# Patient Record
Sex: MALE | Race: WHITE | NOT HISPANIC OR LATINO | URBAN - METROPOLITAN AREA
[De-identification: names, ages, dates, MRNs, and addresses within clinical notes are randomized per-mention and may not be internally consistent; named-entity substitution may affect disease eponyms.]

---

## 2019-10-19 ENCOUNTER — EMERGENCY (EMERGENCY)
Facility: HOSPITAL | Age: 84
LOS: 1 days | Discharge: HOME | End: 2019-10-19
Attending: EMERGENCY MEDICINE | Admitting: EMERGENCY MEDICINE
Payer: MEDICARE

## 2019-10-19 VITALS
WEIGHT: 160.06 LBS | DIASTOLIC BLOOD PRESSURE: 81 MMHG | HEIGHT: 69 IN | HEART RATE: 90 BPM | RESPIRATION RATE: 18 BRPM | OXYGEN SATURATION: 97 % | SYSTOLIC BLOOD PRESSURE: 167 MMHG | TEMPERATURE: 97 F

## 2019-10-19 PROCEDURE — 99282 EMERGENCY DEPT VISIT SF MDM: CPT

## 2019-10-19 NOTE — ED ADULT NURSE NOTE - PAIN RATING/NUMBER SCALE (0-10): ACTIVITY
Patient Instructions by Aníbal Hendricks MD at 09/11/17 03:05 PM     Author:  Aníbal Hendricks MD Service:  (none) Author Type:  Physician     Filed:  09/11/17 03:05 PM Encounter Date:  9/11/2017 Status:  Signed     :  Aníbal Hendricks MD (Physician)            PATIENT INFORMATION   Ice the area and get Adequate Rest and do not exercise for 14 days.    Stay well hydrated.  Do not drive if you are taking pain medications that cause drowsiness.    .    Follow-Up    -- If not better in 10 days, call or make a follow-up appointment.   -- Try over the counter tylenol 500mg every 6 hours for pain or fever.      You may get a survey in the mail.   I would request you to please take time to fill it and give us your feedback on your experience with me.   Thanks for choosing me to provide your health care today.      Additional Educational Resources:  For additional resources regarding your symptoms, diagnosis, or further health information, please visit the Health Resources section on [email or web address] or the Online Health Resources section in [email or web address].          Revision History        User Key Date/Time User Provider Type Action    > [N/A] 09/11/17 03:05 PM Aníbal Hendricks MD Physician Sign            
0

## 2019-10-19 NOTE — ED ADULT TRIAGE NOTE - CHIEF COMPLAINT QUOTE
pt p/w L shoulder numbness earlier today, per patient since resolved. Felt a "ball" move from his neck down to shoulder and now feels it moved to his shoulder.

## 2019-10-19 NOTE — ED PROVIDER NOTE - PATIENT PORTAL LINK FT
You can access the FollowMyHealth Patient Portal offered by [facility] by registering at the following website: [email or web address]. By joining [employer]’s FollowMyHealth portal, you will also be able to view your health information using other applications (apps) compatible with our system.

## 2019-10-19 NOTE — ED PROVIDER NOTE - OBJECTIVE STATEMENT
lower back of head pain c radiation to neck and then to left elbow started about 4am today.  Pain lasted about 20 minutes.  no prior episode.  no other complaints.

## 2019-10-19 NOTE — ED ADULT NURSE NOTE - OBJECTIVE STATEMENT
patient states approx 4 am he awoke with pain in the back of his head that "rolled around like a ball then traveled down the left side of my neck to my left shoulder down the arm to my elbow" pt denies nausea vomiting dizziness blurry vision state he came to have it checked out pt denies injury has no pain at present

## 2019-10-19 NOTE — ED ADULT NURSE NOTE - NSIMPLEMENTINTERV_GEN_ALL_ED
Implemented All Fall with Harm Risk Interventions:  [place] to call system. Call bell, personal items and telephone within reach. Instruct patient to call for assistance. Room bathroom lighting operational. Non-slip footwear when patient is off stretcher. Physically safe environment: no spills, clutter or unnecessary equipment. Stretcher in lowest position, wheels locked, appropriate side rails in place. Provide visual cue, wrist band, yellow gown, etc. Monitor gait and stability. Monitor for mental status changes and reorient to person, place, and time. Review medications for side effects contributing to fall risk. Reinforce activity limits and safety measures with patient and family. Provide visual clues: red socks.

## 2019-10-19 NOTE — ED PROVIDER NOTE - CHPI ED SYMPTOMS NEG
no loss of consciousness/no nausea/no vomiting/HO chronic dizziness intermittent/no change in level of consciousness/no numbness/no blurred vision/no dizziness/no fever/no weakness

## 2021-11-03 ENCOUNTER — EMERGENCY (EMERGENCY)
Facility: HOSPITAL | Age: 86
LOS: 1 days | Discharge: HOME | End: 2021-11-03
Attending: STUDENT IN AN ORGANIZED HEALTH CARE EDUCATION/TRAINING PROGRAM | Admitting: STUDENT IN AN ORGANIZED HEALTH CARE EDUCATION/TRAINING PROGRAM
Payer: MEDICARE

## 2021-11-03 VITALS
TEMPERATURE: 97 F | OXYGEN SATURATION: 98 % | RESPIRATION RATE: 16 BRPM | HEART RATE: 84 BPM | DIASTOLIC BLOOD PRESSURE: 70 MMHG | HEIGHT: 69 IN | WEIGHT: 158.07 LBS | SYSTOLIC BLOOD PRESSURE: 140 MMHG

## 2021-11-03 VITALS
HEART RATE: 73 BPM | DIASTOLIC BLOOD PRESSURE: 70 MMHG | SYSTOLIC BLOOD PRESSURE: 106 MMHG | TEMPERATURE: 98 F | RESPIRATION RATE: 16 BRPM | OXYGEN SATURATION: 98 %

## 2021-11-03 PROBLEM — E11.9 TYPE 2 DIABETES MELLITUS WITHOUT COMPLICATIONS: Chronic | Status: ACTIVE | Noted: 2019-10-19

## 2021-11-03 LAB
ALBUMIN SERPL ELPH-MCNC: 3.3 G/DL — SIGNIFICANT CHANGE UP (ref 3.3–5)
ALP SERPL-CCNC: 52 U/L — SIGNIFICANT CHANGE UP (ref 40–120)
ALT FLD-CCNC: 20 U/L — SIGNIFICANT CHANGE UP (ref 12–78)
ANION GAP SERPL CALC-SCNC: 7 MMOL/L — SIGNIFICANT CHANGE UP (ref 5–17)
APPEARANCE UR: ABNORMAL
AST SERPL-CCNC: 15 U/L — SIGNIFICANT CHANGE UP (ref 15–37)
BASOPHILS # BLD AUTO: 0.01 K/UL — SIGNIFICANT CHANGE UP (ref 0–0.2)
BASOPHILS NFR BLD AUTO: 0.1 % — SIGNIFICANT CHANGE UP (ref 0–2)
BILIRUB SERPL-MCNC: 0.5 MG/DL — SIGNIFICANT CHANGE UP (ref 0.2–1.2)
BILIRUB UR-MCNC: NEGATIVE — SIGNIFICANT CHANGE UP
BUN SERPL-MCNC: 27 MG/DL — HIGH (ref 7–23)
CALCIUM SERPL-MCNC: 9.1 MG/DL — SIGNIFICANT CHANGE UP (ref 8.5–10.1)
CHLORIDE SERPL-SCNC: 104 MMOL/L — SIGNIFICANT CHANGE UP (ref 96–108)
CO2 SERPL-SCNC: 27 MMOL/L — SIGNIFICANT CHANGE UP (ref 22–31)
COLOR SPEC: SIGNIFICANT CHANGE UP
CREAT SERPL-MCNC: 1.5 MG/DL — HIGH (ref 0.5–1.3)
DIFF PNL FLD: ABNORMAL
EOSINOPHIL # BLD AUTO: 0.01 K/UL — SIGNIFICANT CHANGE UP (ref 0–0.5)
EOSINOPHIL NFR BLD AUTO: 0.1 % — SIGNIFICANT CHANGE UP (ref 0–6)
GLUCOSE SERPL-MCNC: 206 MG/DL — HIGH (ref 70–99)
GLUCOSE UR QL: NEGATIVE — SIGNIFICANT CHANGE UP
HCT VFR BLD CALC: 36.3 % — LOW (ref 39–50)
HGB BLD-MCNC: 12 G/DL — LOW (ref 13–17)
IMM GRANULOCYTES NFR BLD AUTO: 0.5 % — SIGNIFICANT CHANGE UP (ref 0–1.5)
KETONES UR-MCNC: ABNORMAL
LEUKOCYTE ESTERASE UR-ACNC: NEGATIVE — SIGNIFICANT CHANGE UP
LYMPHOCYTES # BLD AUTO: 1.19 K/UL — SIGNIFICANT CHANGE UP (ref 1–3.3)
LYMPHOCYTES # BLD AUTO: 11.7 % — LOW (ref 13–44)
MCHC RBC-ENTMCNC: 31.6 PG — SIGNIFICANT CHANGE UP (ref 27–34)
MCHC RBC-ENTMCNC: 33.1 GM/DL — SIGNIFICANT CHANGE UP (ref 32–36)
MCV RBC AUTO: 95.5 FL — SIGNIFICANT CHANGE UP (ref 80–100)
MONOCYTES # BLD AUTO: 0.8 K/UL — SIGNIFICANT CHANGE UP (ref 0–0.9)
MONOCYTES NFR BLD AUTO: 7.9 % — SIGNIFICANT CHANGE UP (ref 2–14)
NEUTROPHILS # BLD AUTO: 8.13 K/UL — HIGH (ref 1.8–7.4)
NEUTROPHILS NFR BLD AUTO: 79.7 % — HIGH (ref 43–77)
NITRITE UR-MCNC: NEGATIVE — SIGNIFICANT CHANGE UP
NRBC # BLD: 0 /100 WBCS — SIGNIFICANT CHANGE UP (ref 0–0)
PH UR: 5 — SIGNIFICANT CHANGE UP (ref 5–8)
PLATELET # BLD AUTO: 188 K/UL — SIGNIFICANT CHANGE UP (ref 150–400)
POTASSIUM SERPL-MCNC: 4.7 MMOL/L — SIGNIFICANT CHANGE UP (ref 3.5–5.3)
POTASSIUM SERPL-SCNC: 4.7 MMOL/L — SIGNIFICANT CHANGE UP (ref 3.5–5.3)
PROT SERPL-MCNC: 6.9 G/DL — SIGNIFICANT CHANGE UP (ref 6–8.3)
PROT UR-MCNC: 30 MG/DL
RBC # BLD: 3.8 M/UL — LOW (ref 4.2–5.8)
RBC # FLD: 12.6 % — SIGNIFICANT CHANGE UP (ref 10.3–14.5)
SODIUM SERPL-SCNC: 138 MMOL/L — SIGNIFICANT CHANGE UP (ref 135–145)
SP GR SPEC: 1.02 — SIGNIFICANT CHANGE UP (ref 1.01–1.02)
UROBILINOGEN FLD QL: NEGATIVE — SIGNIFICANT CHANGE UP
WBC # BLD: 10.19 K/UL — SIGNIFICANT CHANGE UP (ref 3.8–10.5)
WBC # FLD AUTO: 10.19 K/UL — SIGNIFICANT CHANGE UP (ref 3.8–10.5)

## 2021-11-03 PROCEDURE — 36415 COLL VENOUS BLD VENIPUNCTURE: CPT

## 2021-11-03 PROCEDURE — 96374 THER/PROPH/DIAG INJ IV PUSH: CPT

## 2021-11-03 PROCEDURE — 99284 EMERGENCY DEPT VISIT MOD MDM: CPT

## 2021-11-03 PROCEDURE — 99284 EMERGENCY DEPT VISIT MOD MDM: CPT | Mod: 25

## 2021-11-03 PROCEDURE — 80053 COMPREHEN METABOLIC PANEL: CPT

## 2021-11-03 PROCEDURE — 87086 URINE CULTURE/COLONY COUNT: CPT

## 2021-11-03 PROCEDURE — 74176 CT ABD & PELVIS W/O CONTRAST: CPT | Mod: 26,MA

## 2021-11-03 PROCEDURE — 81001 URINALYSIS AUTO W/SCOPE: CPT

## 2021-11-03 PROCEDURE — 85025 COMPLETE CBC W/AUTO DIFF WBC: CPT

## 2021-11-03 PROCEDURE — 74176 CT ABD & PELVIS W/O CONTRAST: CPT | Mod: MA

## 2021-11-03 RX ORDER — TAMSULOSIN HYDROCHLORIDE 0.4 MG/1
1 CAPSULE ORAL
Qty: 10 | Refills: 0
Start: 2021-11-03 | End: 2021-11-12

## 2021-11-03 RX ORDER — LIDOCAINE 4 G/100G
1 CREAM TOPICAL ONCE
Refills: 0 | Status: COMPLETED | OUTPATIENT
Start: 2021-11-03 | End: 2021-11-03

## 2021-11-03 RX ORDER — SODIUM CHLORIDE 9 MG/ML
500 INJECTION INTRAMUSCULAR; INTRAVENOUS; SUBCUTANEOUS ONCE
Refills: 0 | Status: DISCONTINUED | OUTPATIENT
Start: 2021-11-03 | End: 2021-11-07

## 2021-11-03 RX ORDER — CEFUROXIME AXETIL 250 MG
1 TABLET ORAL
Qty: 14 | Refills: 0
Start: 2021-11-03 | End: 2021-11-09

## 2021-11-03 RX ORDER — TAMSULOSIN HYDROCHLORIDE 0.4 MG/1
0.4 CAPSULE ORAL ONCE
Refills: 0 | Status: COMPLETED | OUTPATIENT
Start: 2021-11-03 | End: 2021-11-03

## 2021-11-03 RX ORDER — CEFTRIAXONE 500 MG/1
1000 INJECTION, POWDER, FOR SOLUTION INTRAMUSCULAR; INTRAVENOUS ONCE
Refills: 0 | Status: COMPLETED | OUTPATIENT
Start: 2021-11-03 | End: 2021-11-03

## 2021-11-03 RX ORDER — SODIUM CHLORIDE 9 MG/ML
1000 INJECTION INTRAMUSCULAR; INTRAVENOUS; SUBCUTANEOUS ONCE
Refills: 0 | Status: DISCONTINUED | OUTPATIENT
Start: 2021-11-03 | End: 2021-11-03

## 2021-11-03 RX ORDER — SODIUM CHLORIDE 9 MG/ML
250 INJECTION INTRAMUSCULAR; INTRAVENOUS; SUBCUTANEOUS ONCE
Refills: 0 | Status: COMPLETED | OUTPATIENT
Start: 2021-11-03 | End: 2021-11-03

## 2021-11-03 RX ORDER — OXYCODONE AND ACETAMINOPHEN 5; 325 MG/1; MG/1
1 TABLET ORAL
Qty: 15 | Refills: 0
Start: 2021-11-03 | End: 2021-11-05

## 2021-11-03 RX ADMIN — SODIUM CHLORIDE 250 MILLILITER(S): 9 INJECTION INTRAMUSCULAR; INTRAVENOUS; SUBCUTANEOUS at 14:34

## 2021-11-03 RX ADMIN — TAMSULOSIN HYDROCHLORIDE 0.4 MILLIGRAM(S): 0.4 CAPSULE ORAL at 14:36

## 2021-11-03 RX ADMIN — CEFTRIAXONE 100 MILLIGRAM(S): 500 INJECTION, POWDER, FOR SOLUTION INTRAMUSCULAR; INTRAVENOUS at 14:34

## 2021-11-03 RX ADMIN — LIDOCAINE 1 PATCH: 4 CREAM TOPICAL at 12:46

## 2021-11-03 NOTE — ED PROVIDER NOTE - PATIENT PORTAL LINK FT
You can access the FollowMyHealth Patient Portal offered by [facility] by registering at the following website: [email or web address]. By joining [employer]’s FollowMyHealth portal, you will also be able to view your health information using other applications (apps) compatible with our system.

## 2021-11-03 NOTE — ED PROVIDER NOTE - ATTENDING CONTRIBUTION TO CARE
89yo M pw right lwoer back pain since 4am this morning, somewhat improved with tylenol, no assoc symptoms, no abd pain  likely msk, will check ct eval stone

## 2021-11-03 NOTE — ED PROVIDER NOTE - CLINICAL SUMMARY MEDICAL DECISION MAKING FREE TEXT BOX
right flank pain since this am. differentials include but not limited to renal colic, muscle strain, UTI, pyelonephritis. will check labs, US, CT renal stone hunt. declines pain meds

## 2021-11-03 NOTE — ED PROVIDER NOTE - OBJECTIVE STATEMENT
89 year old male with history of DM, HLD, HTN, and CHF presents with right lower flank pain that started this am 0400. no injury or trauma. took 2 tylenol PTA with overall improvement of pain. pain currently mild in nature. no n/v/d. no urinary complaints. no radicular pain. no abd pain. no leg pain. no loss of bowel or bladder control. ambulatory with steady gait   LARA Hansen

## 2021-11-03 NOTE — ED ADULT NURSE NOTE - ISOLATION TYPE:
Is This A New Presentation, Or A Follow-Up?: Skin Lesions Have Your Skin Lesions Been Treated?: not been treated None

## 2021-11-03 NOTE — ED PROVIDER NOTE - NSFOLLOWUPINSTRUCTIONS_ED_ALL_ED_FT
drink plenty of fluids  flomax once a day in evening   ceftin twice a day for 7 days  tylenol over the counter for pain. percocet for severe pain, take with stool softener   have close follow up with urology (next week in office with Dr. Avendano)      Kidney Stones    WHAT YOU NEED TO KNOW:    Kidney stones form in the urinary system when the water and waste in your urine are out of balance. When this happens, certain types of waste crystals separate from the urine. The crystals build up and form kidney stones. You may have more than one kidney stone.    Kidney Stones          DISCHARGE INSTRUCTIONS:    Return to the emergency department if:   •You are vomiting and it is not relieved with medicine.          Call your doctor or kidney specialist if:   •You have a fever.      •You have trouble urinating.      •You see blood in your urine.      •You have severe pain.      •You have any questions or concerns about your condition or care.      Medicines: You may need any of the following:  •NSAIDs, such as ibuprofen, help decrease swelling, pain, and fever. This medicine is available with or without a doctor's order. NSAIDs can cause stomach bleeding or kidney problems in certain people. If you take blood thinner medicine, always ask your healthcare provider if NSAIDs are safe for you. Always read the medicine label and follow directions.      •Acetaminophen decreases pain and fever. It is available without a doctor's order. Ask how much to take and how often to take it. Follow directions. Read the labels of all other medicines you are using to see if they also contain acetaminophen, or ask your doctor or pharmacist. Acetaminophen can cause liver damage if not taken correctly. Do not use more than 4 grams (4,000 milligrams) total of acetaminophen in one day.       •Prescription pain medicine may be given. Ask your healthcare provider how to take this medicine safely. Some prescription pain medicines contain acetaminophen. Do not take other medicines that contain acetaminophen without talking to your healthcare provider. Too much acetaminophen may cause liver damage. Prescription pain medicine may cause constipation. Ask your healthcare provider how to prevent or treat constipation.       •Medicines to balance your electrolytes may be needed.      •Take your medicine as directed. Contact your healthcare provider if you think your medicine is not helping or if you have side effects. Tell him or her if you are allergic to any medicine. Keep a list of the medicines, vitamins, and herbs you take. Include the amounts, and when and why you take them. Bring the list or the pill bottles to follow-up visits. Carry your medicine list with you in case of an emergency.      What you can do to manage kidney stones:   •Drink more liquids. Your healthcare provider may tell you to drink at least 8 to 12 (eight-ounce) cups of liquids each day. This helps flush out the kidney stones when you urinate. Water is the best liquid to drink.      •Strain your urine every time you go to the bathroom. Urinate through a strainer or a piece of thin cloth to catch the stones. Take the stones to your healthcare provider so they can be sent to the lab for tests. This will help your healthcare providers plan the best treatment for you.  Look for Stones in the Filter           •Ask if you should avoid any foods. You may need to limit oxalate. Oxalate is a chemical found in some plant foods. The most common type of kidney stone is made up of crystals that contain calcium and oxalate. Your healthcare provider or dietitian may recommend that you limit oxalate if you get this type of kidney stone often. You may need to limit how much sodium (salt) or protein you eat. Ask for information about the best foods for you.  High Oxalate Foods           •Be physically active as directed. Your stones may pass more easily if you stay active. Physical activity can also help you manage your weight. Ask about the best activities for you.  [employer] Walking for Exercise           After you pass the kidney stones: Your healthcare provider may order a 24-hour urine test. Results from a 24-hour urine test will help your healthcare provider plan ways to prevent more stones from forming. Your healthcare provider will give you more instructions.    Follow up with your doctor or kidney specialist as directed: Write down your questions so you remember to ask them during your visits.

## 2021-11-03 NOTE — ED PROVIDER NOTE - PROGRESS NOTE DETAILS
Reevaluated patient at bedside. denies current pain. no abd pain. taking po fluids. spoke with Dr. Avendano regarding CT findings, UA, and labs. states patient appropriate for discharge and will see in office next week. would like patient started on ceftin 250 mg twice a day. patient and wife comfortable with plan.  Discussed the results of all diagnostic testing in ED and copies of all reports given.   An opportunity to ask questions was given.  Discussed the importance of prompt, close medical follow-up.  Patient will return with any changes, concerns or persistent / worsening symptoms.  Understanding of all instructions verbalized.

## 2021-11-03 NOTE — ED PROVIDER NOTE - GASTROINTESTINAL NEGATIVE STATEMENT, MLM
right flank pain. no abdominal pain, no bloating, no constipation, no diarrhea, no nausea and no vomiting.

## 2021-11-03 NOTE — ED PROVIDER NOTE - CARE PROVIDER_API CALL
Adair Avendano  UROLOGY  [address], [address]  [place] [address]  Phone: [phone]  Fax: [phone]  Follow Up Time: 1-3 Days

## 2021-11-04 LAB
CULTURE RESULTS: SIGNIFICANT CHANGE UP
SPECIMEN SOURCE: SIGNIFICANT CHANGE UP

## 2022-04-20 PROBLEM — Z00.00 ENCOUNTER FOR PREVENTIVE HEALTH EXAMINATION: Status: ACTIVE | Noted: 2022-04-20

## 2022-05-11 ENCOUNTER — NON-APPOINTMENT (OUTPATIENT)
Age: 87
End: 2022-05-11

## 2023-05-24 ENCOUNTER — EMERGENCY (EMERGENCY)
Facility: HOSPITAL | Age: 88
LOS: 1 days | Discharge: HOME | End: 2023-05-24
Attending: STUDENT IN AN ORGANIZED HEALTH CARE EDUCATION/TRAINING PROGRAM | Admitting: STUDENT IN AN ORGANIZED HEALTH CARE EDUCATION/TRAINING PROGRAM
Payer: MEDICARE

## 2023-05-24 VITALS
TEMPERATURE: 98 F | SYSTOLIC BLOOD PRESSURE: 133 MMHG | DIASTOLIC BLOOD PRESSURE: 76 MMHG | HEIGHT: 68 IN | RESPIRATION RATE: 18 BRPM | OXYGEN SATURATION: 99 % | HEART RATE: 89 BPM | WEIGHT: 151.9 LBS

## 2023-05-24 VITALS
TEMPERATURE: 98 F | SYSTOLIC BLOOD PRESSURE: 146 MMHG | HEART RATE: 87 BPM | RESPIRATION RATE: 18 BRPM | DIASTOLIC BLOOD PRESSURE: 81 MMHG | OXYGEN SATURATION: 98 %

## 2023-05-24 LAB
ALBUMIN SERPL ELPH-MCNC: 3.5 G/DL — SIGNIFICANT CHANGE UP (ref 3.3–5)
ALP SERPL-CCNC: 42 U/L — SIGNIFICANT CHANGE UP (ref 40–120)
ALT FLD-CCNC: 16 U/L — SIGNIFICANT CHANGE UP (ref 12–78)
ANION GAP SERPL CALC-SCNC: 6 MMOL/L — SIGNIFICANT CHANGE UP (ref 5–17)
APPEARANCE UR: ABNORMAL
APTT BLD: 26.8 SEC — LOW (ref 27.5–35.5)
AST SERPL-CCNC: 15 U/L — SIGNIFICANT CHANGE UP (ref 15–37)
BASOPHILS # BLD AUTO: 0.02 K/UL — SIGNIFICANT CHANGE UP (ref 0–0.2)
BASOPHILS NFR BLD AUTO: 0.4 % — SIGNIFICANT CHANGE UP (ref 0–2)
BILIRUB SERPL-MCNC: 0.5 MG/DL — SIGNIFICANT CHANGE UP (ref 0.2–1.2)
BILIRUB UR-MCNC: NEGATIVE — SIGNIFICANT CHANGE UP
BLD GP AB SCN SERPL QL: SIGNIFICANT CHANGE UP
BUN SERPL-MCNC: 30 MG/DL — HIGH (ref 7–23)
CALCIUM SERPL-MCNC: 9.1 MG/DL — SIGNIFICANT CHANGE UP (ref 8.5–10.1)
CHLORIDE SERPL-SCNC: 106 MMOL/L — SIGNIFICANT CHANGE UP (ref 96–108)
CO2 SERPL-SCNC: 27 MMOL/L — SIGNIFICANT CHANGE UP (ref 22–31)
COLOR SPEC: ABNORMAL
CREAT SERPL-MCNC: 1.5 MG/DL — HIGH (ref 0.5–1.3)
DIFF PNL FLD: ABNORMAL
EGFR: 42 ML/MIN/1.73M2 — LOW
EOSINOPHIL # BLD AUTO: 0.15 K/UL — SIGNIFICANT CHANGE UP (ref 0–0.5)
EOSINOPHIL NFR BLD AUTO: 2.7 % — SIGNIFICANT CHANGE UP (ref 0–6)
EPI CELLS # UR: NEGATIVE — SIGNIFICANT CHANGE UP
GLUCOSE SERPL-MCNC: 133 MG/DL — HIGH (ref 70–99)
GLUCOSE UR QL: NEGATIVE — SIGNIFICANT CHANGE UP
HCT VFR BLD CALC: 28.3 % — LOW (ref 39–50)
HCT VFR BLD CALC: 29.3 % — LOW (ref 39–50)
HGB BLD-MCNC: 9.3 G/DL — LOW (ref 13–17)
HGB BLD-MCNC: 9.5 G/DL — LOW (ref 13–17)
IMM GRANULOCYTES NFR BLD AUTO: 0.5 % — SIGNIFICANT CHANGE UP (ref 0–0.9)
INR BLD: 1.12 RATIO — SIGNIFICANT CHANGE UP (ref 0.88–1.16)
KETONES UR-MCNC: ABNORMAL
LEUKOCYTE ESTERASE UR-ACNC: NEGATIVE — SIGNIFICANT CHANGE UP
LYMPHOCYTES # BLD AUTO: 1.51 K/UL — SIGNIFICANT CHANGE UP (ref 1–3.3)
LYMPHOCYTES # BLD AUTO: 27.5 % — SIGNIFICANT CHANGE UP (ref 13–44)
MCHC RBC-ENTMCNC: 30.4 PG — SIGNIFICANT CHANGE UP (ref 27–34)
MCHC RBC-ENTMCNC: 30.7 PG — SIGNIFICANT CHANGE UP (ref 27–34)
MCHC RBC-ENTMCNC: 32.4 GM/DL — SIGNIFICANT CHANGE UP (ref 32–36)
MCHC RBC-ENTMCNC: 32.9 GM/DL — SIGNIFICANT CHANGE UP (ref 32–36)
MCV RBC AUTO: 93.4 FL — SIGNIFICANT CHANGE UP (ref 80–100)
MCV RBC AUTO: 93.6 FL — SIGNIFICANT CHANGE UP (ref 80–100)
MONOCYTES # BLD AUTO: 0.75 K/UL — SIGNIFICANT CHANGE UP (ref 0–0.9)
MONOCYTES NFR BLD AUTO: 13.7 % — SIGNIFICANT CHANGE UP (ref 2–14)
NEUTROPHILS # BLD AUTO: 3.03 K/UL — SIGNIFICANT CHANGE UP (ref 1.8–7.4)
NEUTROPHILS NFR BLD AUTO: 55.2 % — SIGNIFICANT CHANGE UP (ref 43–77)
NITRITE UR-MCNC: NEGATIVE — SIGNIFICANT CHANGE UP
NRBC # BLD: 0 /100 WBCS — SIGNIFICANT CHANGE UP (ref 0–0)
NRBC # BLD: 0 /100 WBCS — SIGNIFICANT CHANGE UP (ref 0–0)
PH UR: 7 — SIGNIFICANT CHANGE UP (ref 5–8)
PLATELET # BLD AUTO: 209 K/UL — SIGNIFICANT CHANGE UP (ref 150–400)
PLATELET # BLD AUTO: 222 K/UL — SIGNIFICANT CHANGE UP (ref 150–400)
POTASSIUM SERPL-MCNC: 4.2 MMOL/L — SIGNIFICANT CHANGE UP (ref 3.5–5.3)
POTASSIUM SERPL-SCNC: 4.2 MMOL/L — SIGNIFICANT CHANGE UP (ref 3.5–5.3)
PROT SERPL-MCNC: 7 G/DL — SIGNIFICANT CHANGE UP (ref 6–8.3)
PROT UR-MCNC: 500 MG/DL
PROTHROM AB SERPL-ACNC: 13.1 SEC — SIGNIFICANT CHANGE UP (ref 10.5–13.4)
RBC # BLD: 3.03 M/UL — LOW (ref 4.2–5.8)
RBC # BLD: 3.13 M/UL — LOW (ref 4.2–5.8)
RBC # FLD: 13.2 % — SIGNIFICANT CHANGE UP (ref 10.3–14.5)
RBC # FLD: 13.3 % — SIGNIFICANT CHANGE UP (ref 10.3–14.5)
RBC CASTS # UR COMP ASSIST: >50 /HPF (ref 0–4)
SODIUM SERPL-SCNC: 139 MMOL/L — SIGNIFICANT CHANGE UP (ref 135–145)
SP GR SPEC: 1 — LOW (ref 1.01–1.02)
UROBILINOGEN FLD QL: NEGATIVE — SIGNIFICANT CHANGE UP
WBC # BLD: 5.49 K/UL — SIGNIFICANT CHANGE UP (ref 3.8–10.5)
WBC # BLD: 5.8 K/UL — SIGNIFICANT CHANGE UP (ref 3.8–10.5)
WBC # FLD AUTO: 5.49 K/UL — SIGNIFICANT CHANGE UP (ref 3.8–10.5)
WBC # FLD AUTO: 5.8 K/UL — SIGNIFICANT CHANGE UP (ref 3.8–10.5)
WBC UR QL: SIGNIFICANT CHANGE UP

## 2023-05-24 PROCEDURE — 85027 COMPLETE CBC AUTOMATED: CPT

## 2023-05-24 PROCEDURE — 36415 COLL VENOUS BLD VENIPUNCTURE: CPT

## 2023-05-24 PROCEDURE — 80053 COMPREHEN METABOLIC PANEL: CPT

## 2023-05-24 PROCEDURE — 99285 EMERGENCY DEPT VISIT HI MDM: CPT

## 2023-05-24 PROCEDURE — 86850 RBC ANTIBODY SCREEN: CPT

## 2023-05-24 PROCEDURE — 87086 URINE CULTURE/COLONY COUNT: CPT

## 2023-05-24 PROCEDURE — 85610 PROTHROMBIN TIME: CPT

## 2023-05-24 PROCEDURE — 86900 BLOOD TYPING SEROLOGIC ABO: CPT

## 2023-05-24 PROCEDURE — 74176 CT ABD & PELVIS W/O CONTRAST: CPT | Mod: MA

## 2023-05-24 PROCEDURE — 74176 CT ABD & PELVIS W/O CONTRAST: CPT | Mod: 26,MA

## 2023-05-24 PROCEDURE — 86901 BLOOD TYPING SEROLOGIC RH(D): CPT

## 2023-05-24 PROCEDURE — 85730 THROMBOPLASTIN TIME PARTIAL: CPT

## 2023-05-24 PROCEDURE — 99284 EMERGENCY DEPT VISIT MOD MDM: CPT | Mod: 25

## 2023-05-24 PROCEDURE — 85025 COMPLETE CBC W/AUTO DIFF WBC: CPT

## 2023-05-24 PROCEDURE — 81001 URINALYSIS AUTO W/SCOPE: CPT

## 2023-05-24 NOTE — ED PROVIDER NOTE - PATIENT PORTAL LINK FT
You can access the FollowMyHealth Patient Portal offered by [facility] by registering at the following website: [email or web address]. By joining [employer]’s FollowMyHealth portal, you will also be able to view your health information using other applications (apps) compatible with our system.

## 2023-05-24 NOTE — ED PROVIDER NOTE - PROGRESS NOTE DETAILS
rpt cbc stable, pt urianting, unable to reach dr dawkins, will dc with close urop fu and discharge instructions spoke with dr dawkins, ok with dc  will see pt in office after weekend, if any issues will need to come back to ED

## 2023-05-24 NOTE — ED PROVIDER NOTE - OBJECTIVE STATEMENT
89 year old male with history of DM, HLD, HTN, and CHF and kidney stones pw hematuria x 4 days. pt was seen by PCP earlier in the week and was started on ceftin 250mg BID 2 days ago for possible UTI. Pt had increased blood in urine today with clot so came to ED. denies dysuria, fever, chills, back or abdominal pain., denies difficulty urinating or completely voiding. no ho hematuria in past.

## 2023-05-24 NOTE — ED ADULT NURSE NOTE - NSFALLRISKINTERV_ED_ALL_ED
Assistance OOB with selected safe patient handling equipment if applicable/Communicate fall risk and risk factors to all staff, patient, and family/Provide visual cue: yellow wristband, yellow gown, etc/Reinforce activity limits and safety measures with patient and family/Toileting schedule using arm’s reach rule for commode and bathroom/Call bell, personal items and telephone in reach/Instruct patient to call for assistance before getting out of bed/chair/stretcher/Non-slip footwear applied when patient is off stretcher/[place] to call system/Physically safe environment - no spills, clutter or unnecessary equipment/Purposeful Proactive Rounding/Room/bathroom lighting operational, light cord in reach

## 2023-05-24 NOTE — ED ADULT NURSE NOTE - OBJECTIVE STATEMENT
pt ambulatory from triage a&oxo4 with complaints of blood in urine since saturday. reports saw PCP Jade and given PO abx. c/o some burning with urination but still able to pass urine. no obvious clots noted. denies lightheadedness/dizziness. skin warm and dry color normal. denies pain. abdomen doesn't appear distended. afebrile

## 2023-05-24 NOTE — ED PROVIDER NOTE - PHYSICAL EXAMINATION
Gen: Well appearing in NAD  Head: NC/AT  Neck: trachea midline  cv: rrr  Resp:  No distress  abd nontender  no cvat   Ext: no deformities  Neuro:  A&O appears non focal  Skin:  Warm and dry as visualized  Psych:  Normal affect and mood

## 2023-05-24 NOTE — ED PROVIDER NOTE - NSFOLLOWUPINSTRUCTIONS_ED_ALL_ED_FT
Follow up with urology as soon as possible  Continue taking antibiotics   Return to ED for worsening bleeding, difficulty urinating, fevers, lightheadedness chest pain or other concerns    Hematuria, Adult    Hematuria is blood in the urine. Blood may be visible in the urine, or it may be identified with a test. This condition can be caused by infections of the bladder, urethra, kidney, or prostate. Other possible causes include:  Kidney stones.  Cancer of the urinary tract.  Too much calcium in the urine.  Conditions that are passed from parent to child (inherited conditions).  Exercise that requires a lot of energy.  Infections can usually be treated with medicine, and a kidney stone usually will pass through your urine. If neither of these is the cause of your hematuria, more tests may be needed to identify the cause of your symptoms.    It is very important to tell your health care provider about any blood in your urine, even if it is painless or the blood stops without treatment. Blood in the urine, when it happens and then stops and then happens again, can be a symptom of a very serious condition, including cancer. There is no pain in the initial stages of many urinary cancers.    Follow these instructions at home:  Medicines    Take over-the-counter and prescription medicines only as told by your health care provider.  If you were prescribed an antibiotic medicine, take it as told by your health care provider. Do not stop taking the antibiotic even if you start to feel better.  Eating and drinking    Drink enough fluid to keep your urine pale yellow. It is recommended that you drink 3–4 quarts (2.8–3.8 L) a day. If you have been diagnosed with an infection, drinking cranberry juice in addition to large amounts of water is recommended.  Avoid caffeine, tea, and carbonated beverages. These tend to irritate the bladder.  Avoid alcohol because it may irritate the prostate (in males).  General instructions    If you have been diagnosed with a kidney stone, follow your health care provider's instructions about straining your urine to catch the stone.  Empty your bladder often. Avoid holding urine for long periods of time.  If you are female:  After a bowel movement, wipe from front to back and use each piece of toilet paper only once.  Empty your bladder before and after sex.  Pay attention to any changes in your symptoms. Tell your health care provider about any changes or any new symptoms.  It is up to you to get the results of any tests. Ask your health care provider, or the department that is doing the test, when your results will be ready.  Keep all follow-up visits. This is important.  Contact a health care provider if:  You develop back pain.  You have a fever or chills.  You have nausea or vomiting.  Your symptoms do not improve after 3 days.  Your symptoms get worse.  Get help right away if:  You develop severe vomiting and are unable to take medicine without vomiting.  You develop severe pain in your back or abdomen even though you are taking medicine.  You pass a large amount of blood in your urine.  You pass blood clots in your urine.  You feel very weak or like you might faint.  You faint.  Summary  Hematuria is blood in the urine. It has many possible causes.  It is very important that you tell your health care provider about any blood in your urine, even if it is painless or the blood stops without treatment.  Take over-the-counter and prescription medicines only as told by your health care provider.  Drink enough fluid to keep your urine pale yellow.  This information is not intended to replace advice given to you by your health care provider. Make sure you discuss any questions you have with your health care provider.    Document Revised: 08/18/2021 Document Reviewed: 08/18/2021  Elsevier Patient Education © 2023 [employer] Inc[employer]

## 2023-05-24 NOTE — ED PROVIDER NOTE - CLINICAL SUMMARY MEDICAL DECISION MAKING FREE TEXT BOX
89 year old male with history of DM, HLD, HTN, and CHF and kidney stones pw hematuria x 4 days. pt was seen by PCP earlier in the week and was started on ceftin 250mg BID 2 days ago for possible UTI. Pt had increased blood in urine today with clot so came to ED. denies dysuria, fever, chills, back or abdominal pain., denies difficulty urinating or completely voiding. no ho hematuria in past.     will eval for uti vs stone vs malignancy, labs, ua, CT   will need urology olga lidiao tien 89 year old male with history of DM, HLD, HTN, and CHF and kidney stones pw hematuria x 4 days. pt was seen by PCP earlier in the week and was started on ceftin 250mg BID 2 days ago for possible UTI. Pt had increased blood in urine today with clot so came to ED. denies dysuria, fever, chills, back or abdominal pain., denies difficulty urinating or completely voiding. no ho hematuria in past.   no ac use   will eval for uti vs stone vs malignancy, labs, ua, CT   will need urology olga lidiao tien

## 2023-05-24 NOTE — ED ADULT TRIAGE NOTE - CHIEF COMPLAINT QUOTE
c/o hematuria since saturday, was prescribed cefuroxime on monday for possible bladder infection. denies dizziness, lightheadedness, abd pain. pmhx htn, hld, bph, DM2.

## 2023-05-24 NOTE — ED PROVIDER NOTE - CARE PROVIDER_API CALL
Adair Avendano  UROLOGY  [address], [address]  [place] [address]  Phone: [phone]  Fax: [phone]  Follow Up Time: 1-3 Days

## 2023-05-25 LAB
CULTURE RESULTS: NO GROWTH — SIGNIFICANT CHANGE UP
SPECIMEN SOURCE: SIGNIFICANT CHANGE UP

## 2023-07-03 NOTE — ED ADULT NURSE NOTE - NS ED NURSE DC INFO COMPLEXITY
c/w metoprolol  Hold eliquis due to lydia
Spontaneous, unlabored and symmetrical
Simple: Patient demonstrates quick and easy understanding/Verbalized Understanding

## 2024-01-01 ENCOUNTER — INPATIENT (INPATIENT)
Facility: HOSPITAL | Age: 88
LOS: 1 days | DRG: 310 | End: 2024-12-06
Attending: INTERNAL MEDICINE | Admitting: INTERNAL MEDICINE
Payer: MEDICARE

## 2024-01-01 ENCOUNTER — RESULT REVIEW (OUTPATIENT)
Age: 88
End: 2024-01-01

## 2024-01-01 VITALS
HEIGHT: 66 IN | RESPIRATION RATE: 16 BRPM | WEIGHT: 160.06 LBS | HEART RATE: 42 BPM | OXYGEN SATURATION: 92 % | TEMPERATURE: 97 F | SYSTOLIC BLOOD PRESSURE: 108 MMHG | DIASTOLIC BLOOD PRESSURE: 60 MMHG

## 2024-01-01 VITALS — TEMPERATURE: 99 F

## 2024-01-01 DIAGNOSIS — R00.1 BRADYCARDIA, UNSPECIFIED: ICD-10-CM

## 2024-01-01 DIAGNOSIS — Z29.9 ENCOUNTER FOR PROPHYLACTIC MEASURES, UNSPECIFIED: ICD-10-CM

## 2024-01-01 DIAGNOSIS — E11.9 TYPE 2 DIABETES MELLITUS WITHOUT COMPLICATIONS: ICD-10-CM

## 2024-01-01 DIAGNOSIS — R60.0 LOCALIZED EDEMA: ICD-10-CM

## 2024-01-01 DIAGNOSIS — Z95.2 PRESENCE OF PROSTHETIC HEART VALVE: Chronic | ICD-10-CM

## 2024-01-01 DIAGNOSIS — D64.9 ANEMIA, UNSPECIFIED: ICD-10-CM

## 2024-01-01 DIAGNOSIS — Z51.5 ENCOUNTER FOR PALLIATIVE CARE: ICD-10-CM

## 2024-01-01 DIAGNOSIS — I10 ESSENTIAL (PRIMARY) HYPERTENSION: ICD-10-CM

## 2024-01-01 DIAGNOSIS — I25.10 ATHEROSCLEROTIC HEART DISEASE OF NATIVE CORONARY ARTERY WITHOUT ANGINA PECTORIS: ICD-10-CM

## 2024-01-01 DIAGNOSIS — Z71.89 OTHER SPECIFIED COUNSELING: ICD-10-CM

## 2024-01-01 DIAGNOSIS — Z98.890 OTHER SPECIFIED POSTPROCEDURAL STATES: Chronic | ICD-10-CM

## 2024-01-01 DIAGNOSIS — N17.9 ACUTE KIDNEY FAILURE, UNSPECIFIED: ICD-10-CM

## 2024-01-01 DIAGNOSIS — E78.5 HYPERLIPIDEMIA, UNSPECIFIED: ICD-10-CM

## 2024-01-01 DIAGNOSIS — G93.41 METABOLIC ENCEPHALOPATHY: ICD-10-CM

## 2024-01-01 DIAGNOSIS — N40.0 BENIGN PROSTATIC HYPERPLASIA WITHOUT LOWER URINARY TRACT SYMPTOMS: ICD-10-CM

## 2024-01-01 DIAGNOSIS — E87.8 OTHER DISORDERS OF ELECTROLYTE AND FLUID BALANCE, NOT ELSEWHERE CLASSIFIED: ICD-10-CM

## 2024-01-01 DIAGNOSIS — R50.9 FEVER, UNSPECIFIED: ICD-10-CM

## 2024-01-01 LAB
A1C WITH ESTIMATED AVERAGE GLUCOSE RESULT: 7.6 % — HIGH (ref 4–5.6)
A1C WITH ESTIMATED AVERAGE GLUCOSE RESULT: 7.8 % — HIGH (ref 4–5.6)
A1C WITH ESTIMATED AVERAGE GLUCOSE RESULT: 7.9 % — HIGH (ref 4–5.6)
ALBUMIN SERPL ELPH-MCNC: 3.3 G/DL — SIGNIFICANT CHANGE UP (ref 3.3–5)
ALBUMIN SERPL ELPH-MCNC: 3.5 G/DL — SIGNIFICANT CHANGE UP (ref 3.3–5)
ALP SERPL-CCNC: 44 U/L — SIGNIFICANT CHANGE UP (ref 40–120)
ALP SERPL-CCNC: 50 U/L — SIGNIFICANT CHANGE UP (ref 40–120)
ALT FLD-CCNC: 35 U/L — SIGNIFICANT CHANGE UP (ref 12–78)
ALT FLD-CCNC: 37 U/L — SIGNIFICANT CHANGE UP (ref 12–78)
ANION GAP SERPL CALC-SCNC: 11 MMOL/L — SIGNIFICANT CHANGE UP (ref 5–17)
ANION GAP SERPL CALC-SCNC: 12 MMOL/L — SIGNIFICANT CHANGE UP (ref 5–17)
ANION GAP SERPL CALC-SCNC: 13 MMOL/L — SIGNIFICANT CHANGE UP (ref 5–17)
ANION GAP SERPL CALC-SCNC: 4 MMOL/L — LOW (ref 5–17)
ANION GAP SERPL CALC-SCNC: 9 MMOL/L — SIGNIFICANT CHANGE UP (ref 5–17)
APPEARANCE UR: ABNORMAL
AST SERPL-CCNC: 32 U/L — SIGNIFICANT CHANGE UP (ref 15–37)
AST SERPL-CCNC: 36 U/L — SIGNIFICANT CHANGE UP (ref 15–37)
BACTERIA # UR AUTO: ABNORMAL /HPF
BASE EXCESS BLDA CALC-SCNC: -5 MMOL/L — LOW (ref -2–3)
BASE EXCESS BLDA CALC-SCNC: -9 MMOL/L — LOW (ref -2–3)
BASOPHILS # BLD AUTO: 0.01 K/UL — SIGNIFICANT CHANGE UP (ref 0–0.2)
BASOPHILS # BLD AUTO: 0.02 K/UL — SIGNIFICANT CHANGE UP (ref 0–0.2)
BASOPHILS NFR BLD AUTO: 0.1 % — SIGNIFICANT CHANGE UP (ref 0–2)
BASOPHILS NFR BLD AUTO: 0.2 % — SIGNIFICANT CHANGE UP (ref 0–2)
BILIRUB SERPL-MCNC: 0.7 MG/DL — SIGNIFICANT CHANGE UP (ref 0.2–1.2)
BILIRUB SERPL-MCNC: 0.7 MG/DL — SIGNIFICANT CHANGE UP (ref 0.2–1.2)
BILIRUB UR-MCNC: NEGATIVE — SIGNIFICANT CHANGE UP
BLOOD GAS COMMENTS ARTERIAL: SIGNIFICANT CHANGE UP
BLOOD GAS COMMENTS ARTERIAL: SIGNIFICANT CHANGE UP
BUN SERPL-MCNC: 48 MG/DL — HIGH (ref 7–23)
BUN SERPL-MCNC: 53 MG/DL — HIGH (ref 7–23)
BUN SERPL-MCNC: 54 MG/DL — HIGH (ref 7–23)
CALCIUM SERPL-MCNC: 9.2 MG/DL — SIGNIFICANT CHANGE UP (ref 8.5–10.1)
CALCIUM SERPL-MCNC: 9.3 MG/DL — SIGNIFICANT CHANGE UP (ref 8.5–10.1)
CALCIUM SERPL-MCNC: 9.5 MG/DL — SIGNIFICANT CHANGE UP (ref 8.5–10.1)
CALCIUM SERPL-MCNC: 9.6 MG/DL — SIGNIFICANT CHANGE UP (ref 8.5–10.1)
CALCIUM SERPL-MCNC: 9.6 MG/DL — SIGNIFICANT CHANGE UP (ref 8.5–10.1)
CHLORIDE SERPL-SCNC: 100 MMOL/L — SIGNIFICANT CHANGE UP (ref 96–108)
CHLORIDE SERPL-SCNC: 100 MMOL/L — SIGNIFICANT CHANGE UP (ref 96–108)
CHLORIDE SERPL-SCNC: 101 MMOL/L — SIGNIFICANT CHANGE UP (ref 96–108)
CHLORIDE SERPL-SCNC: 102 MMOL/L — SIGNIFICANT CHANGE UP (ref 96–108)
CHLORIDE SERPL-SCNC: 106 MMOL/L — SIGNIFICANT CHANGE UP (ref 96–108)
CO2 SERPL-SCNC: 20 MMOL/L — LOW (ref 22–31)
CO2 SERPL-SCNC: 23 MMOL/L — SIGNIFICANT CHANGE UP (ref 22–31)
CO2 SERPL-SCNC: 27 MMOL/L — SIGNIFICANT CHANGE UP (ref 22–31)
CO2 SERPL-SCNC: 28 MMOL/L — SIGNIFICANT CHANGE UP (ref 22–31)
CO2 SERPL-SCNC: 32 MMOL/L — HIGH (ref 22–31)
COLOR SPEC: YELLOW — SIGNIFICANT CHANGE UP
CREAT ?TM UR-MCNC: 170 MG/DL — SIGNIFICANT CHANGE UP
CREAT SERPL-MCNC: 3.5 MG/DL — HIGH (ref 0.5–1.3)
CREAT SERPL-MCNC: 3.5 MG/DL — HIGH (ref 0.5–1.3)
CREAT SERPL-MCNC: 3.6 MG/DL — HIGH (ref 0.5–1.3)
CREAT SERPL-MCNC: 3.7 MG/DL — HIGH (ref 0.5–1.3)
CREAT SERPL-MCNC: 3.9 MG/DL — HIGH (ref 0.5–1.3)
CULTURE RESULTS: NO GROWTH — SIGNIFICANT CHANGE UP
DIFF PNL FLD: ABNORMAL
EGFR: 13 ML/MIN/1.73M2 — LOW
EGFR: 14 ML/MIN/1.73M2 — LOW
EGFR: 15 ML/MIN/1.73M2 — LOW
EOSINOPHIL # BLD AUTO: 0 K/UL — SIGNIFICANT CHANGE UP (ref 0–0.5)
EOSINOPHIL # BLD AUTO: 0.01 K/UL — SIGNIFICANT CHANGE UP (ref 0–0.5)
EOSINOPHIL NFR BLD AUTO: 0 % — SIGNIFICANT CHANGE UP (ref 0–6)
EOSINOPHIL NFR BLD AUTO: 0.1 % — SIGNIFICANT CHANGE UP (ref 0–6)
EPI CELLS # UR: PRESENT
ESTIMATED AVERAGE GLUCOSE: 171 MG/DL — HIGH (ref 68–114)
ESTIMATED AVERAGE GLUCOSE: 177 MG/DL — HIGH (ref 68–114)
ESTIMATED AVERAGE GLUCOSE: 180 MG/DL — HIGH (ref 68–114)
GAS PNL BLDA: SIGNIFICANT CHANGE UP
GAS PNL BLDA: SIGNIFICANT CHANGE UP
GLUCOSE BLDC GLUCOMTR-MCNC: 224 MG/DL — HIGH (ref 70–99)
GLUCOSE BLDC GLUCOMTR-MCNC: 227 MG/DL — HIGH (ref 70–99)
GLUCOSE BLDC GLUCOMTR-MCNC: 233 MG/DL — HIGH (ref 70–99)
GLUCOSE BLDC GLUCOMTR-MCNC: 238 MG/DL — HIGH (ref 70–99)
GLUCOSE BLDC GLUCOMTR-MCNC: 247 MG/DL — HIGH (ref 70–99)
GLUCOSE SERPL-MCNC: 221 MG/DL — HIGH (ref 70–99)
GLUCOSE SERPL-MCNC: 240 MG/DL — HIGH (ref 70–99)
GLUCOSE SERPL-MCNC: 243 MG/DL — HIGH (ref 70–99)
GLUCOSE SERPL-MCNC: 256 MG/DL — HIGH (ref 70–99)
GLUCOSE SERPL-MCNC: 319 MG/DL — HIGH (ref 70–99)
GLUCOSE UR QL: NEGATIVE MG/DL — SIGNIFICANT CHANGE UP
HCO3 BLDA-SCNC: 17 MMOL/L — LOW (ref 21–28)
HCO3 BLDA-SCNC: 20 MMOL/L — LOW (ref 21–28)
HCT VFR BLD CALC: 26.5 % — LOW (ref 39–50)
HCT VFR BLD CALC: 27.3 % — LOW (ref 39–50)
HCT VFR BLD CALC: 32 % — LOW (ref 39–50)
HGB BLD-MCNC: 10.6 G/DL — LOW (ref 13–17)
HGB BLD-MCNC: 9.1 G/DL — LOW (ref 13–17)
HGB BLD-MCNC: 9.1 G/DL — LOW (ref 13–17)
HOROWITZ INDEX BLDA+IHG-RTO: 32 — SIGNIFICANT CHANGE UP
HYALINE CASTS # UR AUTO: PRESENT
IMM GRANULOCYTES NFR BLD AUTO: 0.2 % — SIGNIFICANT CHANGE UP (ref 0–0.9)
IMM GRANULOCYTES NFR BLD AUTO: 0.8 % — SIGNIFICANT CHANGE UP (ref 0–0.9)
KETONES UR-MCNC: ABNORMAL MG/DL
LACTATE SERPL-SCNC: 2.1 MMOL/L — HIGH (ref 0.7–2)
LACTATE SERPL-SCNC: 3.5 MMOL/L — HIGH (ref 0.7–2)
LACTATE SERPL-SCNC: 4.7 MMOL/L — CRITICAL HIGH (ref 0.7–2)
LACTATE SERPL-SCNC: 7.6 MMOL/L — CRITICAL HIGH (ref 0.7–2)
LEUKOCYTE ESTERASE UR-ACNC: ABNORMAL
LYMPHOCYTES # BLD AUTO: 1.16 K/UL — SIGNIFICANT CHANGE UP (ref 1–3.3)
LYMPHOCYTES # BLD AUTO: 1.88 K/UL — SIGNIFICANT CHANGE UP (ref 1–3.3)
LYMPHOCYTES # BLD AUTO: 11.2 % — LOW (ref 13–44)
LYMPHOCYTES # BLD AUTO: 22 % — SIGNIFICANT CHANGE UP (ref 13–44)
MAGNESIUM SERPL-MCNC: 1.5 MG/DL — LOW (ref 1.6–2.6)
MAGNESIUM SERPL-MCNC: 1.7 MG/DL — SIGNIFICANT CHANGE UP (ref 1.6–2.6)
MAGNESIUM SERPL-MCNC: 1.9 MG/DL — SIGNIFICANT CHANGE UP (ref 1.6–2.6)
MAGNESIUM SERPL-MCNC: 2.1 MG/DL — SIGNIFICANT CHANGE UP (ref 1.6–2.6)
MAGNESIUM SERPL-MCNC: 2.2 MG/DL — SIGNIFICANT CHANGE UP (ref 1.6–2.6)
MCHC RBC-ENTMCNC: 31.7 PG — SIGNIFICANT CHANGE UP (ref 27–34)
MCHC RBC-ENTMCNC: 31.7 PG — SIGNIFICANT CHANGE UP (ref 27–34)
MCHC RBC-ENTMCNC: 31.8 PG — SIGNIFICANT CHANGE UP (ref 27–34)
MCHC RBC-ENTMCNC: 33.1 G/DL — SIGNIFICANT CHANGE UP (ref 32–36)
MCHC RBC-ENTMCNC: 33.3 G/DL — SIGNIFICANT CHANGE UP (ref 32–36)
MCHC RBC-ENTMCNC: 34.3 G/DL — SIGNIFICANT CHANGE UP (ref 32–36)
MCV RBC AUTO: 92.7 FL — SIGNIFICANT CHANGE UP (ref 80–100)
MCV RBC AUTO: 95.1 FL — SIGNIFICANT CHANGE UP (ref 80–100)
MCV RBC AUTO: 95.8 FL — SIGNIFICANT CHANGE UP (ref 80–100)
MONOCYTES # BLD AUTO: 0.57 K/UL — SIGNIFICANT CHANGE UP (ref 0–0.9)
MONOCYTES # BLD AUTO: 1.11 K/UL — HIGH (ref 0–0.9)
MONOCYTES NFR BLD AUTO: 10.7 % — SIGNIFICANT CHANGE UP (ref 2–14)
MONOCYTES NFR BLD AUTO: 6.7 % — SIGNIFICANT CHANGE UP (ref 2–14)
MRSA PCR RESULT.: SIGNIFICANT CHANGE UP
NEUTROPHILS # BLD AUTO: 6.04 K/UL — SIGNIFICANT CHANGE UP (ref 1.8–7.4)
NEUTROPHILS # BLD AUTO: 8.03 K/UL — HIGH (ref 1.8–7.4)
NEUTROPHILS NFR BLD AUTO: 70.8 % — SIGNIFICANT CHANGE UP (ref 43–77)
NEUTROPHILS NFR BLD AUTO: 77.2 % — HIGH (ref 43–77)
NITRITE UR-MCNC: NEGATIVE — SIGNIFICANT CHANGE UP
NRBC # BLD: 0 /100 WBCS — SIGNIFICANT CHANGE UP (ref 0–0)
NT-PROBNP SERPL-SCNC: 8310 PG/ML — HIGH (ref 0–450)
OSMOLALITY UR: 367 MOSM/KG — SIGNIFICANT CHANGE UP (ref 50–1200)
PCO2 BLDA: 32 MMHG — LOW (ref 35–48)
PCO2 BLDA: 32 MMHG — LOW (ref 35–48)
PH BLDA: 7.33 — LOW (ref 7.35–7.45)
PH BLDA: 7.4 — SIGNIFICANT CHANGE UP (ref 7.35–7.45)
PH UR: 5 — SIGNIFICANT CHANGE UP (ref 5–8)
PHOSPHATE SERPL-MCNC: 2.1 MG/DL — LOW (ref 2.5–4.5)
PHOSPHATE SERPL-MCNC: 2.5 MG/DL — SIGNIFICANT CHANGE UP (ref 2.5–4.5)
PHOSPHATE SERPL-MCNC: 2.8 MG/DL — SIGNIFICANT CHANGE UP (ref 2.5–4.5)
PHOSPHATE SERPL-MCNC: 3.7 MG/DL — SIGNIFICANT CHANGE UP (ref 2.5–4.5)
PLATELET # BLD AUTO: 169 K/UL — SIGNIFICANT CHANGE UP (ref 150–400)
PLATELET # BLD AUTO: 178 K/UL — SIGNIFICANT CHANGE UP (ref 150–400)
PLATELET # BLD AUTO: 210 K/UL — SIGNIFICANT CHANGE UP (ref 150–400)
PO2 BLDA: 102 MMHG — SIGNIFICANT CHANGE UP (ref 83–108)
PO2 BLDA: 63 MMHG — LOW (ref 83–108)
POTASSIUM SERPL-MCNC: 4.1 MMOL/L — SIGNIFICANT CHANGE UP (ref 3.5–5.3)
POTASSIUM SERPL-MCNC: 4.3 MMOL/L — SIGNIFICANT CHANGE UP (ref 3.5–5.3)
POTASSIUM SERPL-MCNC: 4.6 MMOL/L — SIGNIFICANT CHANGE UP (ref 3.5–5.3)
POTASSIUM SERPL-MCNC: 4.6 MMOL/L — SIGNIFICANT CHANGE UP (ref 3.5–5.3)
POTASSIUM SERPL-MCNC: 6.2 MMOL/L — CRITICAL HIGH (ref 3.5–5.3)
POTASSIUM SERPL-SCNC: 4.1 MMOL/L — SIGNIFICANT CHANGE UP (ref 3.5–5.3)
POTASSIUM SERPL-SCNC: 4.3 MMOL/L — SIGNIFICANT CHANGE UP (ref 3.5–5.3)
POTASSIUM SERPL-SCNC: 4.6 MMOL/L — SIGNIFICANT CHANGE UP (ref 3.5–5.3)
POTASSIUM SERPL-SCNC: 4.6 MMOL/L — SIGNIFICANT CHANGE UP (ref 3.5–5.3)
POTASSIUM SERPL-SCNC: 6.2 MMOL/L — CRITICAL HIGH (ref 3.5–5.3)
POTASSIUM UR-SCNC: 43.5 MMOL/L — SIGNIFICANT CHANGE UP
PROT ?TM UR-MCNC: 53 MG/DL — HIGH (ref 0–12)
PROT SERPL-MCNC: 6 G/DL — SIGNIFICANT CHANGE UP (ref 6–8.3)
PROT SERPL-MCNC: 7 G/DL — SIGNIFICANT CHANGE UP (ref 6–8.3)
PROT UR-MCNC: 30 MG/DL
PROT/CREAT UR-RTO: 0.3 RATIO — HIGH (ref 0–0.2)
RBC # BLD: 2.86 M/UL — LOW (ref 4.2–5.8)
RBC # BLD: 2.87 M/UL — LOW (ref 4.2–5.8)
RBC # BLD: 3.34 M/UL — LOW (ref 4.2–5.8)
RBC # FLD: 13.9 % — SIGNIFICANT CHANGE UP (ref 10.3–14.5)
RBC # FLD: 13.9 % — SIGNIFICANT CHANGE UP (ref 10.3–14.5)
RBC # FLD: 14 % — SIGNIFICANT CHANGE UP (ref 10.3–14.5)
RBC CASTS # UR COMP ASSIST: 16 /HPF — HIGH (ref 0–4)
S AUREUS DNA NOSE QL NAA+PROBE: SIGNIFICANT CHANGE UP
SAO2 % BLDA: 93.8 % — LOW (ref 94–98)
SAO2 % BLDA: 98.9 % — HIGH (ref 94–98)
SODIUM SERPL-SCNC: 136 MMOL/L — SIGNIFICANT CHANGE UP (ref 135–145)
SODIUM SERPL-SCNC: 136 MMOL/L — SIGNIFICANT CHANGE UP (ref 135–145)
SODIUM SERPL-SCNC: 138 MMOL/L — SIGNIFICANT CHANGE UP (ref 135–145)
SODIUM SERPL-SCNC: 139 MMOL/L — SIGNIFICANT CHANGE UP (ref 135–145)
SODIUM SERPL-SCNC: 139 MMOL/L — SIGNIFICANT CHANGE UP (ref 135–145)
SODIUM UR-SCNC: 47 MMOL/L — SIGNIFICANT CHANGE UP
SP GR SPEC: 1.02 — SIGNIFICANT CHANGE UP (ref 1–1.03)
SPECIMEN SOURCE: SIGNIFICANT CHANGE UP
TROPONIN I, HIGH SENSITIVITY RESULT: 158.6 NG/L — HIGH
TROPONIN I, HIGH SENSITIVITY RESULT: 227.4 NG/L — HIGH
TROPONIN I, HIGH SENSITIVITY RESULT: 243.1 NG/L — HIGH
TROPONIN I, HIGH SENSITIVITY RESULT: 253 NG/L — HIGH
TROPONIN I, HIGH SENSITIVITY RESULT: 91 NG/L — HIGH
TSH SERPL-MCNC: 3.34 UIU/ML — SIGNIFICANT CHANGE UP (ref 0.36–3.74)
UROBILINOGEN FLD QL: 0.2 MG/DL — SIGNIFICANT CHANGE UP (ref 0.2–1)
UUN UR-MCNC: 389 MG/DL — SIGNIFICANT CHANGE UP
WBC # BLD: 10.39 K/UL — SIGNIFICANT CHANGE UP (ref 3.8–10.5)
WBC # BLD: 11.16 K/UL — HIGH (ref 3.8–10.5)
WBC # BLD: 8.54 K/UL — SIGNIFICANT CHANGE UP (ref 3.8–10.5)
WBC # FLD AUTO: 10.39 K/UL — SIGNIFICANT CHANGE UP (ref 3.8–10.5)
WBC # FLD AUTO: 11.16 K/UL — HIGH (ref 3.8–10.5)
WBC # FLD AUTO: 8.54 K/UL — SIGNIFICANT CHANGE UP (ref 3.8–10.5)
WBC UR QL: 20 /HPF — HIGH (ref 0–5)

## 2024-01-01 PROCEDURE — 99233 SBSQ HOSP IP/OBS HIGH 50: CPT

## 2024-01-01 PROCEDURE — 99292 CRITICAL CARE ADDL 30 MIN: CPT | Mod: 25

## 2024-01-01 PROCEDURE — 93010 ELECTROCARDIOGRAM REPORT: CPT

## 2024-01-01 PROCEDURE — 83880 ASSAY OF NATRIURETIC PEPTIDE: CPT

## 2024-01-01 PROCEDURE — 76770 US EXAM ABDO BACK WALL COMP: CPT | Mod: 26

## 2024-01-01 PROCEDURE — 87640 STAPH A DNA AMP PROBE: CPT

## 2024-01-01 PROCEDURE — 84484 ASSAY OF TROPONIN QUANT: CPT

## 2024-01-01 PROCEDURE — 99291 CRITICAL CARE FIRST HOUR: CPT

## 2024-01-01 PROCEDURE — 86900 BLOOD TYPING SEROLOGIC ABO: CPT

## 2024-01-01 PROCEDURE — 99291 CRITICAL CARE FIRST HOUR: CPT | Mod: 25

## 2024-01-01 PROCEDURE — 85025 COMPLETE CBC W/AUTO DIFF WBC: CPT

## 2024-01-01 PROCEDURE — 84540 ASSAY OF URINE/UREA-N: CPT

## 2024-01-01 PROCEDURE — 83036 HEMOGLOBIN GLYCOSYLATED A1C: CPT

## 2024-01-01 PROCEDURE — 84100 ASSAY OF PHOSPHORUS: CPT

## 2024-01-01 PROCEDURE — 33210 INSERT ELECTRD/PM CATH SNGL: CPT | Mod: 26

## 2024-01-01 PROCEDURE — 76937 US GUIDE VASCULAR ACCESS: CPT | Mod: 26,59

## 2024-01-01 PROCEDURE — 84300 ASSAY OF URINE SODIUM: CPT

## 2024-01-01 PROCEDURE — 94640 AIRWAY INHALATION TREATMENT: CPT

## 2024-01-01 PROCEDURE — 87086 URINE CULTURE/COLONY COUNT: CPT

## 2024-01-01 PROCEDURE — 83605 ASSAY OF LACTIC ACID: CPT

## 2024-01-01 PROCEDURE — 99223 1ST HOSP IP/OBS HIGH 75: CPT

## 2024-01-01 PROCEDURE — 96361 HYDRATE IV INFUSION ADD-ON: CPT

## 2024-01-01 PROCEDURE — 86850 RBC ANTIBODY SCREEN: CPT

## 2024-01-01 PROCEDURE — 36600 WITHDRAWAL OF ARTERIAL BLOOD: CPT

## 2024-01-01 PROCEDURE — 71045 X-RAY EXAM CHEST 1 VIEW: CPT | Mod: 26,76

## 2024-01-01 PROCEDURE — 87641 MR-STAPH DNA AMP PROBE: CPT

## 2024-01-01 PROCEDURE — 96374 THER/PROPH/DIAG INJ IV PUSH: CPT

## 2024-01-01 PROCEDURE — 86901 BLOOD TYPING SEROLOGIC RH(D): CPT

## 2024-01-01 PROCEDURE — 81001 URINALYSIS AUTO W/SCOPE: CPT

## 2024-01-01 PROCEDURE — 84133 ASSAY OF URINE POTASSIUM: CPT

## 2024-01-01 PROCEDURE — 80048 BASIC METABOLIC PNL TOTAL CA: CPT

## 2024-01-01 PROCEDURE — 83935 ASSAY OF URINE OSMOLALITY: CPT

## 2024-01-01 PROCEDURE — 93005 ELECTROCARDIOGRAM TRACING: CPT

## 2024-01-01 PROCEDURE — 93306 TTE W/DOPPLER COMPLETE: CPT | Mod: 26

## 2024-01-01 PROCEDURE — 96375 TX/PRO/DX INJ NEW DRUG ADDON: CPT

## 2024-01-01 PROCEDURE — 99292 CRITICAL CARE ADDL 30 MIN: CPT

## 2024-01-01 PROCEDURE — 84443 ASSAY THYROID STIM HORMONE: CPT

## 2024-01-01 PROCEDURE — 82803 BLOOD GASES ANY COMBINATION: CPT

## 2024-01-01 PROCEDURE — 83735 ASSAY OF MAGNESIUM: CPT

## 2024-01-01 PROCEDURE — 82962 GLUCOSE BLOOD TEST: CPT

## 2024-01-01 PROCEDURE — 36556 INSERT NON-TUNNEL CV CATH: CPT | Mod: 59

## 2024-01-01 PROCEDURE — 71045 X-RAY EXAM CHEST 1 VIEW: CPT

## 2024-01-01 PROCEDURE — 87040 BLOOD CULTURE FOR BACTERIA: CPT

## 2024-01-01 PROCEDURE — 82570 ASSAY OF URINE CREATININE: CPT

## 2024-01-01 PROCEDURE — 93306 TTE W/DOPPLER COMPLETE: CPT

## 2024-01-01 PROCEDURE — 84156 ASSAY OF PROTEIN URINE: CPT

## 2024-01-01 PROCEDURE — 71045 X-RAY EXAM CHEST 1 VIEW: CPT | Mod: 26

## 2024-01-01 PROCEDURE — 76770 US EXAM ABDO BACK WALL COMP: CPT

## 2024-01-01 PROCEDURE — 36415 COLL VENOUS BLD VENIPUNCTURE: CPT

## 2024-01-01 PROCEDURE — 85027 COMPLETE CBC AUTOMATED: CPT

## 2024-01-01 PROCEDURE — 80053 COMPREHEN METABOLIC PANEL: CPT

## 2024-01-01 RX ORDER — LORAZEPAM 2 MG/1
0.5 TABLET ORAL
Refills: 0 | Status: DISCONTINUED | OUTPATIENT
Start: 2024-01-01 | End: 2024-01-01

## 2024-01-01 RX ORDER — SODIUM CHLORIDE 9 MG/ML
10 INJECTION, SOLUTION INTRAMUSCULAR; INTRAVENOUS; SUBCUTANEOUS
Refills: 0 | Status: DISCONTINUED | OUTPATIENT
Start: 2024-01-01 | End: 2024-01-01

## 2024-01-01 RX ORDER — HEPARIN SODIUM,PORCINE 1000/ML
5000 VIAL (ML) INJECTION EVERY 8 HOURS
Refills: 0 | Status: DISCONTINUED | OUTPATIENT
Start: 2024-01-01 | End: 2024-01-01

## 2024-01-01 RX ORDER — TORSEMIDE 10 MG
1 TABLET ORAL
Refills: 0 | DISCHARGE

## 2024-01-01 RX ORDER — LISINOPRIL 20 MG/1
1 TABLET ORAL
Refills: 0 | DISCHARGE

## 2024-01-01 RX ORDER — SODIUM CHLORIDE 9 MG/ML
500 INJECTION, SOLUTION INTRAMUSCULAR; INTRAVENOUS; SUBCUTANEOUS ONCE
Refills: 0 | Status: COMPLETED | OUTPATIENT
Start: 2024-01-01 | End: 2024-01-01

## 2024-01-01 RX ORDER — CEFAZOLIN SODIUM 10 G
2000 VIAL (EA) INJECTION ONCE
Refills: 0 | Status: COMPLETED | OUTPATIENT
Start: 2024-01-01 | End: 2024-01-01

## 2024-01-01 RX ORDER — GLUCAGON INJECTION, SOLUTION 0.5 MG/.1ML
10 INJECTION, SOLUTION SUBCUTANEOUS ONCE
Refills: 0 | Status: DISCONTINUED | OUTPATIENT
Start: 2024-01-01 | End: 2024-01-01

## 2024-01-01 RX ORDER — SODIUM ZIRCONIUM CYCLOSILICATE 5 G/5G
10 POWDER, FOR SUSPENSION ORAL ONCE
Refills: 0 | Status: COMPLETED | OUTPATIENT
Start: 2024-01-01 | End: 2024-01-01

## 2024-01-01 RX ORDER — MECLIZINE HCL 12.5 MG
1 TABLET ORAL
Refills: 0 | DISCHARGE

## 2024-01-01 RX ORDER — 0.9 % SODIUM CHLORIDE 0.9 %
1000 INTRAVENOUS SOLUTION INTRAVENOUS ONCE
Refills: 0 | Status: COMPLETED | OUTPATIENT
Start: 2024-01-01 | End: 2024-01-01

## 2024-01-01 RX ORDER — 0.9 % SODIUM CHLORIDE 0.9 %
1000 INTRAVENOUS SOLUTION INTRAVENOUS
Refills: 0 | Status: DISCONTINUED | OUTPATIENT
Start: 2024-01-01 | End: 2024-01-01

## 2024-01-01 RX ORDER — ACETAMINOPHEN 500MG 500 MG/1
1000 TABLET, COATED ORAL ONCE
Refills: 0 | Status: COMPLETED | OUTPATIENT
Start: 2024-01-01 | End: 2024-01-01

## 2024-01-01 RX ORDER — SITAGLIPTIN 50 MG/1
1 TABLET ORAL
Refills: 0 | DISCHARGE

## 2024-01-01 RX ORDER — TAMSULOSIN HYDROCHLORIDE 0.4 MG/1
1 CAPSULE ORAL
Refills: 0 | DISCHARGE

## 2024-01-01 RX ORDER — INSULIN REG, HUM S-S BUFF 100/ML
5 VIAL (ML) INJECTION ONCE
Refills: 0 | Status: DISCONTINUED | OUTPATIENT
Start: 2024-01-01 | End: 2024-01-01

## 2024-01-01 RX ORDER — HEPARIN SODIUM 5000 [USP'U]/.5ML
15 INJECTION, SOLUTION INTRAVENOUS; SUBCUTANEOUS ONCE
Refills: 0 | Status: COMPLETED | OUTPATIENT
Start: 2024-01-01 | End: 2024-01-01

## 2024-01-01 RX ORDER — GLIPIZIDE 5 MG/1
1 TABLET, FILM COATED, EXTENDED RELEASE ORAL
Refills: 0 | DISCHARGE

## 2024-01-01 RX ORDER — GLYCOPYRROLATE 1 MG/1
0.3 TABLET ORAL ONCE
Refills: 0 | Status: COMPLETED | OUTPATIENT
Start: 2024-01-01 | End: 2024-01-01

## 2024-01-01 RX ORDER — SODIUM BICARBONATE 84 MG/ML
0.33 INJECTION, SOLUTION INTRAVENOUS
Qty: 150 | Refills: 0 | Status: DISCONTINUED | OUTPATIENT
Start: 2024-01-01 | End: 2024-01-01

## 2024-01-01 RX ORDER — METOPROLOL TARTRATE 100 MG/1
1 TABLET, FILM COATED ORAL
Refills: 0 | DISCHARGE

## 2024-01-01 RX ORDER — ALBUTEROL 90 MCG
10 AEROSOL (GRAM) INHALATION ONCE
Refills: 0 | Status: COMPLETED | OUTPATIENT
Start: 2024-01-01 | End: 2024-01-01

## 2024-01-01 RX ORDER — HYDROMORPHONE HYDROCHLORIDE 2 MG/1
1 TABLET ORAL
Refills: 0 | Status: DISCONTINUED | OUTPATIENT
Start: 2024-01-01 | End: 2024-01-01

## 2024-01-01 RX ORDER — HYDROMORPHONE HYDROCHLORIDE 2 MG/1
1 TABLET ORAL EVERY 4 HOURS
Refills: 0 | Status: DISCONTINUED | OUTPATIENT
Start: 2024-01-01 | End: 2024-01-01

## 2024-01-01 RX ORDER — HYDROMORPHONE HYDROCHLORIDE 2 MG/1
0.5 TABLET ORAL
Refills: 0 | Status: DISCONTINUED | OUTPATIENT
Start: 2024-01-01 | End: 2024-01-01

## 2024-01-01 RX ORDER — LORAZEPAM 2 MG/1
0.5 TABLET ORAL EVERY 6 HOURS
Refills: 0 | Status: DISCONTINUED | OUTPATIENT
Start: 2024-01-01 | End: 2024-01-01

## 2024-01-01 RX ORDER — SODIUM CHLORIDE 9 MG/ML
1000 INJECTION, SOLUTION INTRAMUSCULAR; INTRAVENOUS; SUBCUTANEOUS
Refills: 0 | Status: DISCONTINUED | OUTPATIENT
Start: 2024-01-01 | End: 2024-01-01

## 2024-01-01 RX ORDER — HYDROMORPHONE HYDROCHLORIDE 2 MG/1
1 TABLET ORAL ONCE
Refills: 0 | Status: DISCONTINUED | OUTPATIENT
Start: 2024-01-01 | End: 2024-01-01

## 2024-01-01 RX ORDER — GLUCAGON INJECTION, SOLUTION 0.5 MG/.1ML
5 INJECTION, SOLUTION SUBCUTANEOUS ONCE
Refills: 0 | Status: DISCONTINUED | OUTPATIENT
Start: 2024-01-01 | End: 2024-01-01

## 2024-01-01 RX ORDER — CLOPIDOGREL 75 MG/1
1 TABLET, FILM COATED ORAL
Refills: 0 | DISCHARGE

## 2024-01-01 RX ORDER — ATROPINE 2 MG/.7ML
1 INJECTION INTRAMUSCULAR ONCE
Refills: 0 | Status: COMPLETED | OUTPATIENT
Start: 2024-01-01 | End: 2024-01-01

## 2024-01-01 RX ORDER — CHLORHEXIDINE GLUCONATE 1.2 MG/ML
1 RINSE ORAL
Refills: 0 | Status: DISCONTINUED | OUTPATIENT
Start: 2024-01-01 | End: 2024-01-01

## 2024-01-01 RX ORDER — GLUCAGON INJECTION, SOLUTION 0.5 MG/.1ML
1 INJECTION, SOLUTION SUBCUTANEOUS ONCE
Refills: 0 | Status: DISCONTINUED | OUTPATIENT
Start: 2024-01-01 | End: 2024-01-01

## 2024-01-01 RX ORDER — INSULIN REG, HUM S-S BUFF 100/ML
10 VIAL (ML) INJECTION ONCE
Refills: 0 | Status: COMPLETED | OUTPATIENT
Start: 2024-01-01 | End: 2024-01-01

## 2024-01-01 RX ORDER — HEPARIN SODIUM,PORCINE 1000/ML
5000 VIAL (ML) INJECTION EVERY 12 HOURS
Refills: 0 | Status: DISCONTINUED | OUTPATIENT
Start: 2024-01-01 | End: 2024-01-01

## 2024-01-01 RX ORDER — HYDROMORPHONE HYDROCHLORIDE 2 MG/1
2 TABLET ORAL ONCE
Refills: 0 | Status: DISCONTINUED | OUTPATIENT
Start: 2024-01-01 | End: 2024-01-01

## 2024-01-01 RX ADMIN — Medication 2: at 11:50

## 2024-01-01 RX ADMIN — Medication 500 MILLIGRAM(S): at 10:47

## 2024-01-01 RX ADMIN — Medication 2.5 MILLIGRAM(S): at 10:48

## 2024-01-01 RX ADMIN — ATROPINE 1 MILLIGRAM(S): 2 INJECTION INTRAMUSCULAR at 09:09

## 2024-01-01 RX ADMIN — CHLORHEXIDINE GLUCONATE 1 APPLICATION(S): 1.2 RINSE ORAL at 06:35

## 2024-01-01 RX ADMIN — Medication 2: at 18:07

## 2024-01-01 RX ADMIN — Medication 2: at 06:38

## 2024-01-01 RX ADMIN — SODIUM BICARBONATE 150 MEQ/KG/HR: 84 INJECTION, SOLUTION INTRAVENOUS at 21:05

## 2024-01-01 RX ADMIN — HYDROMORPHONE HYDROCHLORIDE 1 MILLIGRAM(S): 2 TABLET ORAL at 17:52

## 2024-01-01 RX ADMIN — SODIUM CHLORIDE 500 MILLILITER(S): 9 INJECTION, SOLUTION INTRAMUSCULAR; INTRAVENOUS; SUBCUTANEOUS at 09:44

## 2024-01-01 RX ADMIN — Medication 75 MILLILITER(S): at 01:41

## 2024-01-01 RX ADMIN — HYDROMORPHONE HYDROCHLORIDE 1 MILLIGRAM(S): 2 TABLET ORAL at 14:13

## 2024-01-01 RX ADMIN — HYDROMORPHONE HYDROCHLORIDE 2 MILLIGRAM(S): 2 TABLET ORAL at 12:50

## 2024-01-01 RX ADMIN — Medication 10 UNIT(S): at 10:50

## 2024-01-01 RX ADMIN — HYDROMORPHONE HYDROCHLORIDE 1 MILLIGRAM(S): 2 TABLET ORAL at 15:06

## 2024-01-01 RX ADMIN — Medication 2: at 00:02

## 2024-01-01 RX ADMIN — Medication 5000 UNIT(S): at 05:49

## 2024-01-01 RX ADMIN — Medication 25 GRAM(S): at 22:47

## 2024-01-01 RX ADMIN — Medication 25 GRAM(S): at 10:47

## 2024-01-01 RX ADMIN — Medication 5000 UNIT(S): at 21:24

## 2024-01-01 RX ADMIN — SODIUM BICARBONATE 150 MEQ/KG/HR: 84 INJECTION, SOLUTION INTRAVENOUS at 14:13

## 2024-01-01 RX ADMIN — HYDROMORPHONE HYDROCHLORIDE 1 MILLIGRAM(S): 2 TABLET ORAL at 12:45

## 2024-01-01 RX ADMIN — SODIUM CHLORIDE 500 MILLILITER(S): 9 INJECTION, SOLUTION INTRAMUSCULAR; INTRAVENOUS; SUBCUTANEOUS at 10:44

## 2024-01-01 RX ADMIN — HYDROMORPHONE HYDROCHLORIDE 2 MILLIGRAM(S): 2 TABLET ORAL at 13:50

## 2024-01-01 RX ADMIN — HYDROMORPHONE HYDROCHLORIDE 1 MILLIGRAM(S): 2 TABLET ORAL at 16:06

## 2024-01-01 RX ADMIN — HYDROMORPHONE HYDROCHLORIDE 1 MILLIGRAM(S): 2 TABLET ORAL at 13:13

## 2024-01-01 RX ADMIN — Medication 300 MILLIGRAM(S): at 23:08

## 2024-01-01 RX ADMIN — GLYCOPYRROLATE 0.3 MILLIGRAM(S): 1 TABLET ORAL at 13:10

## 2024-01-01 RX ADMIN — ACETAMINOPHEN 500MG 400 MILLIGRAM(S): 500 TABLET, COATED ORAL at 13:15

## 2024-01-01 RX ADMIN — LORAZEPAM 0.5 MILLIGRAM(S): 2 TABLET ORAL at 22:58

## 2024-01-01 RX ADMIN — SODIUM ZIRCONIUM CYCLOSILICATE 10 GRAM(S): 5 POWDER, FOR SUSPENSION ORAL at 10:47

## 2024-01-01 RX ADMIN — ACETAMINOPHEN 500MG 1000 MILLIGRAM(S): 500 TABLET, COATED ORAL at 14:00

## 2024-01-01 RX ADMIN — Medication 75 MILLILITER(S): at 13:16

## 2024-01-01 RX ADMIN — CHLORHEXIDINE GLUCONATE 1 APPLICATION(S): 1.2 RINSE ORAL at 05:49

## 2024-01-01 RX ADMIN — Medication 1000 MILLILITER(S): at 15:03

## 2024-01-01 RX ADMIN — Medication 2 MILLIGRAM(S): at 12:47

## 2024-01-01 RX ADMIN — LORAZEPAM 0.5 MILLIGRAM(S): 2 TABLET ORAL at 12:48

## 2024-01-01 RX ADMIN — Medication 1000 MILLILITER(S): at 12:40

## 2024-01-01 RX ADMIN — CHLORHEXIDINE GLUCONATE 1 APPLICATION(S): 1.2 RINSE ORAL at 15:00

## 2024-01-01 RX ADMIN — Medication 5000 UNIT(S): at 21:20

## 2024-01-01 RX ADMIN — Medication 5000 UNIT(S): at 14:09

## 2024-01-01 RX ADMIN — Medication 80 MILLIGRAM(S): at 21:24

## 2024-01-01 RX ADMIN — HEPARIN SODIUM 63.75 MILLIMOLE(S): 5000 INJECTION, SOLUTION INTRAVENOUS; SUBCUTANEOUS at 23:07

## 2024-01-01 RX ADMIN — LORAZEPAM 0.5 MILLIGRAM(S): 2 TABLET ORAL at 17:52

## 2024-01-01 RX ADMIN — LORAZEPAM 0.5 MILLIGRAM(S): 2 TABLET ORAL at 07:55

## 2024-01-01 RX ADMIN — HYDROMORPHONE HYDROCHLORIDE 1 MILLIGRAM(S): 2 TABLET ORAL at 13:45

## 2024-01-01 RX ADMIN — Medication 50 MILLILITER(S): at 10:46

## 2024-04-17 ENCOUNTER — INPATIENT (INPATIENT)
Facility: HOSPITAL | Age: 89
LOS: 3 days | Discharge: HOME | DRG: 311 | End: 2024-04-21
Attending: STUDENT IN AN ORGANIZED HEALTH CARE EDUCATION/TRAINING PROGRAM | Admitting: STUDENT IN AN ORGANIZED HEALTH CARE EDUCATION/TRAINING PROGRAM
Payer: MEDICARE

## 2024-04-17 VITALS
WEIGHT: 149.91 LBS | SYSTOLIC BLOOD PRESSURE: 174 MMHG | RESPIRATION RATE: 16 BRPM | HEIGHT: 69 IN | HEART RATE: 78 BPM | OXYGEN SATURATION: 100 % | TEMPERATURE: 98 F | DIASTOLIC BLOOD PRESSURE: 80 MMHG

## 2024-04-17 DIAGNOSIS — R79.89 OTHER SPECIFIED ABNORMAL FINDINGS OF BLOOD CHEMISTRY: ICD-10-CM

## 2024-04-17 DIAGNOSIS — I24.9 ACUTE ISCHEMIC HEART DISEASE, UNSPECIFIED: ICD-10-CM

## 2024-04-17 DIAGNOSIS — I25.10 ATHEROSCLEROTIC HEART DISEASE OF NATIVE CORONARY ARTERY WITHOUT ANGINA PECTORIS: ICD-10-CM

## 2024-04-17 DIAGNOSIS — Z98.890 OTHER SPECIFIED POSTPROCEDURAL STATES: Chronic | ICD-10-CM

## 2024-04-17 DIAGNOSIS — Z95.2 PRESENCE OF PROSTHETIC HEART VALVE: ICD-10-CM

## 2024-04-17 DIAGNOSIS — D64.9 ANEMIA, UNSPECIFIED: ICD-10-CM

## 2024-04-17 DIAGNOSIS — I10 ESSENTIAL (PRIMARY) HYPERTENSION: ICD-10-CM

## 2024-04-17 DIAGNOSIS — Z29.9 ENCOUNTER FOR PROPHYLACTIC MEASURES, UNSPECIFIED: ICD-10-CM

## 2024-04-17 DIAGNOSIS — Z95.2 PRESENCE OF PROSTHETIC HEART VALVE: Chronic | ICD-10-CM

## 2024-04-17 DIAGNOSIS — E11.9 TYPE 2 DIABETES MELLITUS WITHOUT COMPLICATIONS: ICD-10-CM

## 2024-04-17 DIAGNOSIS — C67.9 MALIGNANT NEOPLASM OF BLADDER, UNSPECIFIED: ICD-10-CM

## 2024-04-17 LAB
ALBUMIN SERPL ELPH-MCNC: 3.8 G/DL — SIGNIFICANT CHANGE UP (ref 3.3–5)
ALP SERPL-CCNC: 63 U/L — SIGNIFICANT CHANGE UP (ref 40–120)
ALT FLD-CCNC: 27 U/L — SIGNIFICANT CHANGE UP (ref 12–78)
ANION GAP SERPL CALC-SCNC: 8 MMOL/L — SIGNIFICANT CHANGE UP (ref 5–17)
APTT BLD: 26.9 SEC — SIGNIFICANT CHANGE UP (ref 24.5–35.6)
AST SERPL-CCNC: 24 U/L — SIGNIFICANT CHANGE UP (ref 15–37)
BASOPHILS # BLD AUTO: 0.03 K/UL — SIGNIFICANT CHANGE UP (ref 0–0.2)
BASOPHILS NFR BLD AUTO: 0.3 % — SIGNIFICANT CHANGE UP (ref 0–2)
BILIRUB SERPL-MCNC: 0.5 MG/DL — SIGNIFICANT CHANGE UP (ref 0.2–1.2)
BUN SERPL-MCNC: 32 MG/DL — HIGH (ref 7–23)
CALCIUM SERPL-MCNC: 9.8 MG/DL — SIGNIFICANT CHANGE UP (ref 8.5–10.1)
CHLORIDE SERPL-SCNC: 108 MMOL/L — SIGNIFICANT CHANGE UP (ref 96–108)
CO2 SERPL-SCNC: 28 MMOL/L — SIGNIFICANT CHANGE UP (ref 22–31)
CREAT SERPL-MCNC: 1.7 MG/DL — HIGH (ref 0.5–1.3)
EGFR: 36 ML/MIN/1.73M2 — LOW
EOSINOPHIL # BLD AUTO: 0.21 K/UL — SIGNIFICANT CHANGE UP (ref 0–0.5)
EOSINOPHIL NFR BLD AUTO: 2.3 % — SIGNIFICANT CHANGE UP (ref 0–6)
GLUCOSE SERPL-MCNC: 179 MG/DL — HIGH (ref 70–99)
HCT VFR BLD CALC: 36 % — LOW (ref 39–50)
HGB BLD-MCNC: 12.1 G/DL — LOW (ref 13–17)
IMM GRANULOCYTES NFR BLD AUTO: 0.2 % — SIGNIFICANT CHANGE UP (ref 0–0.9)
INR BLD: 0.97 RATIO — SIGNIFICANT CHANGE UP (ref 0.85–1.18)
LIDOCAIN IGE QN: 11 U/L — LOW (ref 13–75)
LYMPHOCYTES # BLD AUTO: 3.64 K/UL — HIGH (ref 1–3.3)
LYMPHOCYTES # BLD AUTO: 39.4 % — SIGNIFICANT CHANGE UP (ref 13–44)
MAGNESIUM SERPL-MCNC: 2.2 MG/DL — SIGNIFICANT CHANGE UP (ref 1.6–2.6)
MCHC RBC-ENTMCNC: 31.8 PG — SIGNIFICANT CHANGE UP (ref 27–34)
MCHC RBC-ENTMCNC: 33.6 GM/DL — SIGNIFICANT CHANGE UP (ref 32–36)
MCV RBC AUTO: 94.5 FL — SIGNIFICANT CHANGE UP (ref 80–100)
MONOCYTES # BLD AUTO: 0.94 K/UL — HIGH (ref 0–0.9)
MONOCYTES NFR BLD AUTO: 10.2 % — SIGNIFICANT CHANGE UP (ref 2–14)
NEUTROPHILS # BLD AUTO: 4.39 K/UL — SIGNIFICANT CHANGE UP (ref 1.8–7.4)
NEUTROPHILS NFR BLD AUTO: 47.6 % — SIGNIFICANT CHANGE UP (ref 43–77)
NRBC # BLD: 0 /100 WBCS — SIGNIFICANT CHANGE UP (ref 0–0)
PLATELET # BLD AUTO: 256 K/UL — SIGNIFICANT CHANGE UP (ref 150–400)
POTASSIUM SERPL-MCNC: 4.8 MMOL/L — SIGNIFICANT CHANGE UP (ref 3.5–5.3)
POTASSIUM SERPL-SCNC: 4.8 MMOL/L — SIGNIFICANT CHANGE UP (ref 3.5–5.3)
PROT SERPL-MCNC: 8 G/DL — SIGNIFICANT CHANGE UP (ref 6–8.3)
PROTHROM AB SERPL-ACNC: 11.4 SEC — SIGNIFICANT CHANGE UP (ref 9.5–13)
RBC # BLD: 3.81 M/UL — LOW (ref 4.2–5.8)
RBC # FLD: 13.5 % — SIGNIFICANT CHANGE UP (ref 10.3–14.5)
SODIUM SERPL-SCNC: 144 MMOL/L — SIGNIFICANT CHANGE UP (ref 135–145)
TROPONIN I, HIGH SENSITIVITY RESULT: 128.2 NG/L — HIGH
TROPONIN I, HIGH SENSITIVITY RESULT: 26.4 NG/L — SIGNIFICANT CHANGE UP
WBC # BLD: 9.23 K/UL — SIGNIFICANT CHANGE UP (ref 3.8–10.5)
WBC # FLD AUTO: 9.23 K/UL — SIGNIFICANT CHANGE UP (ref 3.8–10.5)

## 2024-04-17 PROCEDURE — 99223 1ST HOSP IP/OBS HIGH 75: CPT | Mod: GC,AI

## 2024-04-17 PROCEDURE — 93010 ELECTROCARDIOGRAM REPORT: CPT

## 2024-04-17 PROCEDURE — 99291 CRITICAL CARE FIRST HOUR: CPT

## 2024-04-17 PROCEDURE — 71045 X-RAY EXAM CHEST 1 VIEW: CPT | Mod: 26

## 2024-04-17 RX ORDER — INSULIN LISPRO 100/ML
VIAL (ML) SUBCUTANEOUS
Refills: 0 | Status: DISCONTINUED | OUTPATIENT
Start: 2024-04-17 | End: 2024-04-21

## 2024-04-17 RX ORDER — DEXTROSE 50 % IN WATER 50 %
25 SYRINGE (ML) INTRAVENOUS ONCE
Refills: 0 | Status: DISCONTINUED | OUTPATIENT
Start: 2024-04-17 | End: 2024-04-21

## 2024-04-17 RX ORDER — ASPIRIN/CALCIUM CARB/MAGNESIUM 324 MG
325 TABLET ORAL ONCE
Refills: 0 | Status: COMPLETED | OUTPATIENT
Start: 2024-04-17 | End: 2024-04-17

## 2024-04-17 RX ORDER — DEXTROSE 50 % IN WATER 50 %
15 SYRINGE (ML) INTRAVENOUS ONCE
Refills: 0 | Status: DISCONTINUED | OUTPATIENT
Start: 2024-04-17 | End: 2024-04-21

## 2024-04-17 RX ORDER — METFORMIN HYDROCHLORIDE 850 MG/1
1 TABLET ORAL
Refills: 0 | DISCHARGE

## 2024-04-17 RX ORDER — FUROSEMIDE 40 MG
40 TABLET ORAL ONCE
Refills: 0 | Status: COMPLETED | OUTPATIENT
Start: 2024-04-17 | End: 2024-04-17

## 2024-04-17 RX ORDER — HEPARIN SODIUM 5000 [USP'U]/ML
4100 INJECTION INTRAVENOUS; SUBCUTANEOUS EVERY 6 HOURS
Refills: 0 | Status: DISCONTINUED | OUTPATIENT
Start: 2024-04-17 | End: 2024-04-20

## 2024-04-17 RX ORDER — GABAPENTIN 400 MG/1
1 CAPSULE ORAL
Qty: 0 | Refills: 0 | DISCHARGE

## 2024-04-17 RX ORDER — MESALAMINE 400 MG
0 TABLET, DELAYED RELEASE (ENTERIC COATED) ORAL
Qty: 0 | Refills: 0 | DISCHARGE

## 2024-04-17 RX ORDER — TICAGRELOR 90 MG/1
180 TABLET ORAL ONCE
Refills: 0 | Status: COMPLETED | OUTPATIENT
Start: 2024-04-17 | End: 2024-04-17

## 2024-04-17 RX ORDER — DEXTROSE 50 % IN WATER 50 %
12.5 SYRINGE (ML) INTRAVENOUS ONCE
Refills: 0 | Status: DISCONTINUED | OUTPATIENT
Start: 2024-04-17 | End: 2024-04-21

## 2024-04-17 RX ORDER — ALOGLIPTIN 12.5 MG/1
1 TABLET, FILM COATED ORAL
Refills: 0 | DISCHARGE

## 2024-04-17 RX ORDER — GLUCAGON INJECTION, SOLUTION 0.5 MG/.1ML
1 INJECTION, SOLUTION SUBCUTANEOUS ONCE
Refills: 0 | Status: DISCONTINUED | OUTPATIENT
Start: 2024-04-17 | End: 2024-04-21

## 2024-04-17 RX ORDER — ALOGLIPTIN 12.5 MG/1
1 TABLET, FILM COATED ORAL
Qty: 0 | Refills: 0 | DISCHARGE

## 2024-04-17 RX ORDER — PREGABALIN 225 MG/1
1 CAPSULE ORAL
Refills: 0 | DISCHARGE

## 2024-04-17 RX ORDER — ASPIRIN/CALCIUM CARB/MAGNESIUM 324 MG
81 TABLET ORAL DAILY
Refills: 0 | Status: DISCONTINUED | OUTPATIENT
Start: 2024-04-18 | End: 2024-04-21

## 2024-04-17 RX ORDER — METFORMIN HYDROCHLORIDE 850 MG/1
1 TABLET ORAL
Qty: 0 | Refills: 0 | DISCHARGE

## 2024-04-17 RX ORDER — ASPIRIN/CALCIUM CARB/MAGNESIUM 324 MG
1 TABLET ORAL
Refills: 0 | DISCHARGE

## 2024-04-17 RX ORDER — HEPARIN SODIUM 5000 [USP'U]/ML
4100 INJECTION INTRAVENOUS; SUBCUTANEOUS ONCE
Refills: 0 | Status: COMPLETED | OUTPATIENT
Start: 2024-04-17 | End: 2024-04-17

## 2024-04-17 RX ORDER — HEPARIN SODIUM 5000 [USP'U]/ML
INJECTION INTRAVENOUS; SUBCUTANEOUS
Qty: 25000 | Refills: 0 | Status: DISCONTINUED | OUTPATIENT
Start: 2024-04-17 | End: 2024-04-18

## 2024-04-17 RX ORDER — TICAGRELOR 90 MG/1
90 TABLET ORAL EVERY 12 HOURS
Refills: 0 | Status: DISCONTINUED | OUTPATIENT
Start: 2024-04-18 | End: 2024-04-20

## 2024-04-17 RX ADMIN — TICAGRELOR 180 MILLIGRAM(S): 90 TABLET ORAL at 21:25

## 2024-04-17 RX ADMIN — HEPARIN SODIUM 800 UNIT(S)/HR: 5000 INJECTION INTRAVENOUS; SUBCUTANEOUS at 21:37

## 2024-04-17 RX ADMIN — Medication 4: at 22:51

## 2024-04-17 RX ADMIN — HEPARIN SODIUM 4100 UNIT(S): 5000 INJECTION INTRAVENOUS; SUBCUTANEOUS at 21:35

## 2024-04-17 RX ADMIN — Medication 40 MILLIGRAM(S): at 22:44

## 2024-04-17 RX ADMIN — HEPARIN SODIUM 800 UNIT(S)/HR: 5000 INJECTION INTRAVENOUS; SUBCUTANEOUS at 22:08

## 2024-04-17 NOTE — H&P ADULT - ATTENDING COMMENTS
89yo M with pmhx HTN, CAD, HLD, DM2, mitral valve replacement, bladder cancer s/p TURBT (2023), aortic stenosis s/p TAVR (2017), ulcerative colitis presenting with chest pain. Admitted for ACS    Agree with above. Edited where appropriate

## 2024-04-17 NOTE — H&P ADULT - PROBLEM/PLAN-7
PAST SURGICAL HISTORY:  S/P cataract extraction     S/P colon resection     S/P lumpectomy of breast     S/P tonsillectomy      DISPLAY PLAN FREE TEXT

## 2024-04-17 NOTE — ED PROVIDER NOTE - PHYSICAL EXAMINATION
Examination reveals a well-developed well-nourished white male who appears younger than his stated age of 89 years old in mild distress at the time of evaluation.  HEENT normocephalic atraumatic pupils equal round reactive to light and accommodation extraocular movements are intact neck is supple no JVD no bruits lungs are clear heart S1-S2 with any murmurs rubs gallops abdomen is soft nontender positive bowel sounds extremities without clubbing cyanosis or edema.  Neurologically alert oriented x 4 without any focal neurologic deficits.  Skin is warm and dry.

## 2024-04-17 NOTE — PATIENT PROFILE ADULT - NSPROHARDOFHEAROTHER_GEN_ALL_CORE
Patient requests that you speak to his right ear (hearing aide in place) , patient deaf in left ear.

## 2024-04-17 NOTE — H&P ADULT - NSHPPHYSICALEXAM_GEN_ALL_CORE
T(C): 36.8 (04-17-24 @ 22:20), Max: 36.8 (04-17-24 @ 22:20)  HR: 75 (04-17-24 @ 21:45) (75 - 78)  BP: 148/67 (04-17-24 @ 21:45) (148/67 - 174/80)  RR: 16 (04-17-24 @ 21:45) (16 - 18)  SpO2: 96% (04-17-24 @ 21:45) (96% - 100%)  Wt(kg): --    Physical Exam:   GENERAL: well-groomed, well-developed, NAD  HEENT: head NC/AT; conjunctiva & sclera clear; hearing grossly intact, moist mucous membranes  RESPIRATORY: CTA B/L, no wheezing, rales, rhonchi or rubs  CARDIOVASCULAR: S1&S2, RRR, no murmurs or gallops  ABDOMEN: soft, non-tender, non-distended, + Bowel sounds, no guarding, rebound or rigidity  MUSCULOSKELETAL:  no clubbing, cyanosis or edema of all 4 extremities  SKIN: warm and dry, color normal  NEUROLOGIC: AA&O X3,  no focal deficits  Psych: Normal mood and affect, normal behavior T(C): 36.8 (04-17-24 @ 22:20), Max: 36.8 (04-17-24 @ 22:20)  HR: 75 (04-17-24 @ 21:45) (75 - 78)  BP: 148/67 (04-17-24 @ 21:45) (148/67 - 174/80)  RR: 16 (04-17-24 @ 21:45) (16 - 18)  SpO2: 96% (04-17-24 @ 21:45) (96% - 100%)  Wt(kg): --  Physical Exam:   GENERAL: well-groomed, well-developed, NAD  HEENT: head NC/AT; conjunctiva & sclera clear; hearing grossly intact, moist mucous membranes  RESPIRATORY: CTA B/L, no wheezing, rales, rhonchi or rubs  CARDIOVASCULAR: S1&S2, RRR, no murmurs or gallops  ABDOMEN: soft, non-tender, non-distended, + Bowel sounds, no guarding, rebound or rigidity  MUSCULOSKELETAL:  no clubbing, cyanosis or edema of all 4 extremities  SKIN: warm and dry, color normal  NEUROLOGIC: AA&O X3,  no focal deficits  Psych: Normal mood and affect, normal behavior

## 2024-04-17 NOTE — PATIENT PROFILE ADULT - FALL HARM RISK - HARM RISK INTERVENTIONS

## 2024-04-17 NOTE — H&P ADULT - NSHPSOCIALHISTORY_GEN_ALL_CORE
Tobacco Usage:  former smoker, quit 1988  Alcohol Usage: denies  Substance Use:  denies  Lives with: at home with family  Ambulates: with walker

## 2024-04-17 NOTE — CONSULT NOTE ADULT - ASSESSMENT
89M PMHx HTN, HLD, T2DM, Bladder CA, MVR presents to ER with chest pain radiating down bilateral arms since this afternoon.   Assessment:  1. NSTEMI  2. LBBB      Plan:  NEURO: Monitor mental status closely, avoid neurosuppresants. Optimize pain control - analgesia PRN w/__. Serial neurologic assessments.     CV: Maintain goal MAP >65. Keep K~4 and Mg>2 for optimal arrhythmia suppression.    PULM: Satting well on __, will utilize supplemental O2 PRN to maintain goal SpO2 >88%. Incentive spirometry, Chest PT/Pulmonary toilet, HOB >30'. Albuterol PRN.     GI: Diet. Protonix QD.    RENAL: Renal function currently WNL. Trend lytes/Scr daily with BMP, Strict I's and O's, goal UOP 0.5 cc/kg/hr, renal dose meds and avoid nephrotoxins.    ENDO: Aggressive glycemic control to limit FS glucose to <180mg/dl. Keep Euthyroid.     ID: ABX use and/or discontinuation based on discussion with ID in conjunction with clinical features, culture data, and judicious procalcitonin monitoring.    HEME: Pharmacologic DVT Prophylaxis in addition to SCD's w/ __    GOC:   89M PMHx HTN, HLD, T2DM, Bladder CA, MVR presents to ER with chest pain radiating down bilateral arms since this afternoon.   Assessment:  1. NSTEMI  2. LBBB  3. YANETH on CKD    Plan:  NEURO:   -Monitor mental status closely, avoid neurosuppresants.  -Serial neurologic assessments.     CV:   -Maintain goal MAP >65.   -Keep K~4 and Mg>2 for optimal arrhythmia suppression.  -EKG NSR with LBBB. Troponin elevated to 128.2. Interventional Cardiology consulted and recommended loading with ASA/Brillinta and start on Heparin Infusion. Plan for cath in AM, or sooner if decompensates. Serial EKG/Nadia. Statin.     PULM:   -Satting well on RA, will utilize supplemental O2 PRN to maintain goal SpO2 >88%.   -Incentive spirometry, Chest PT/Pulmonary toilet, HOB >30'.     GI:   -DASH/TLC Diet.    RENAL:   -Renal function currently with mild YANETH (baseline SCr ~1.50).   -trend lytes/Scr daily with BMP  -I's and O's, goal UOP 0.5 cc/kg/hr  -renal dose meds and avoid nephrotoxins     ENDO:   -Aggressive glycemic control to limit FS glucose to <180mg/dl. ISS.     ID:   -Afebrile. No concern for infectious process. Monitor off abx.     HEME:   -Heparin Infusion.    GOC: Full Code.  DISPO: Case and plan discussed with eICU attending, Dr. Lea. Admit to MICU.

## 2024-04-17 NOTE — H&P ADULT - PROBLEM SELECTOR PLAN 1
S/p ASA 325mg, sublingual nitro, Brilinta load and heparin gtt in ED  - EKG NSR with LAD, LBBB and J point inferior leads  - Troponin 26 -> 128. Trend to peak  - Interventional cardio consulted by ED - heparin gtt, aspirin and Brilinta load  - Admit to MICU  - Plan for possible cath in AM, would keep NPO after midnight  - Management per ICU  - Cardiology ([facility]), consulted S/p ASA 325mg, sublingual nitro, Brilinta load and heparin gtt in ED  - EKG NSR with LAD, LBBB and J point inferior leads  - Troponin 26 -> 128. Trend to peak  - Interventional cardio consulted by ED - heparin gtt, aspirin and Brilinta load  - Admit to MICU for close monitoring   - Plan for possible cath in AM, would keep NPO after midnight  - Management per ICU  - Cardiology ([facility]), consulted

## 2024-04-17 NOTE — H&P ADULT - NSHPREVIEWOFSYSTEMS_GEN_ALL_CORE
REVIEW OF SYSTEMS:    CONSTITUTIONAL: No weakness, fevers or chills  HEENT:  No headache, no visual changes, no sore throat, neck supple  RESPIRATORY: No cough, wheezing, hemoptysis; No shortness of breath  CARDIOVASCULAR: +chest heaviness, no palpitations  GASTROINTESTINAL: No abdominal pain, no nausea, vomiting, or hematemesis; No diarrhea or constipation. No melena or hematochezia.  GENITOURINARY: No dysuria, frequency or hematuria  NEUROLOGICAL: No focal weakness or dizziness   MSK: No myalgias  SKIN: No itching, burning, rashes, or lesions REVIEW OF SYSTEMS:  CONSTITUTIONAL: No weakness, fevers or chills  HEENT:  No headache, no visual changes, no sore throat, neck supple  RESPIRATORY: No cough, wheezing, hemoptysis; No shortness of breath  CARDIOVASCULAR: +chest heaviness, no palpitations  GASTROINTESTINAL: No abdominal pain, no nausea, vomiting, or hematemesis; No diarrhea or constipation. No melena or hematochezia.  GENITOURINARY: No dysuria, frequency or hematuria  NEUROLOGICAL: No focal weakness or dizziness   MSK: No myalgias  SKIN: No itching, burning, rashes, or lesions

## 2024-04-17 NOTE — H&P ADULT - HISTORY OF PRESENT ILLNESS
89yo M with pmhx HTN, CAD, HLD, DM2, mitral valve replacement, bladder cancer s/p TURBT (2023), aortic stenosis s/p TAVR (2017), ulcerative colitis presenting with chest pain. Patient states around 4pm he developed mid retrosternal chest heaviness after eating a large lunch consisting of hot dogs, pork and vegetables. He states it radiated to b/l UE and he became diaphoretic and short of breath, didn't improve after 2hrs. EMS was called and he received 325mg aspirin, sublingual nitro en route. On arrival to the ED patient states his chest heaviness has greatly improved.    IN THE ED  VS: T 98, HR 76, /75, RR 18, SpO2 99% on RA  Labs: H/H 12.1/36, Trop 26 -> 128, BUN/Cr 32/1.7  Imaging: CXR on personal read with b/i infiltrates  EKG: NSR, LAD, LBBB with J point inferior leads  S/p Aspirin 325mg, sublingual nitro, Brilinta 180mg, heparin gtt 89yo M with pmhx HTN, CAD, HLD, DM2, mitral valve replacement, bladder cancer s/p TURBT (2023), aortic stenosis s/p TAVR (2017), ulcerative colitis presenting with chest pain. Patient states around 4pm he developed mid retrosternal chest heaviness after eating a large lunch consisting of hot dogs, pork and vegetables. He states it radiated to b/l UE and he became diaphoretic and short of breath, didn't improve after 2hrs. EMS was called and he received 325mg aspirin, sublingual nitro en route. On arrival to the ED patient states his chest heaviness has greatly improved.  IN THE ED  VS: T 98, HR 76, /75, RR 18, SpO2 99% on RA  Labs: H/H 12.1/36, Trop 26 -> 128, BUN/Cr 32/1.7  Imaging: CXR on personal read with b/i infiltrates  EKG: NSR, LAD, LBBB with J point inferior leads  S/p Aspirin 325mg, sublingual nitro, Brilinta 180mg, heparin gtt

## 2024-04-17 NOTE — ED PROVIDER NOTE - WR ORDER DATE AND TIME 1
----- Message from Kristin Mcclelland sent at 1/18/2024 12:51 PM EST -----  Subject: Message to Provider    QUESTIONS  Information for Provider? [facility] is going to be faxing an order   for incontinence supplies. They were originally ordered by Dr. Mendenhall.   ---------------------------------------------------------------------------  --------------  CALL BACK INFO  [phone]; OK to leave message on voicemail  ---------------------------------------------------------------------------  --------------  SCRIPT ANSWERS  Relationship to Patient? Self  
17-Apr-2024 18:33

## 2024-04-17 NOTE — ED PROVIDER NOTE - OTHER FINDINGS
EKG reveals normal sinus rhythm at a rate of 75 with evidence of a left bundle branch block and what appears to be J-point elevation in 2 3 aVF.

## 2024-04-17 NOTE — H&P ADULT - PROBLEM SELECTOR PLAN 4
Chronic, BP on admission 166/75  -Continue home lisinopril 5mg qd  -Monitor hemodynamics  -DASH/TLC diet  -Management per ICU

## 2024-04-17 NOTE — ED ADULT NURSE NOTE - NSFALLHARMRISKINTERV_ED_ALL_ED
Assistance OOB with selected safe patient handling equipment if applicable/Communicate risk of Fall with Harm to all staff, patient, and family/Provide visual cue: red socks, yellow wristband, yellow gown, etc/Reinforce activity limits and safety measures with patient and family/Bed in lowest position, wheels locked, appropriate side rails in place/Call bell, personal items and telephone in reach/Instruct patient to call for assistance before getting out of bed/chair/stretcher/Non-slip footwear applied when patient is off stretcher/Tow to call system/Physically safe environment - no spills, clutter or unnecessary equipment/Purposeful Proactive Rounding/Room/bathroom lighting operational, light cord in reach

## 2024-04-17 NOTE — H&P ADULT - CONVERSATION DETAILS
Writer met with patient, wife and daughter at bedside. Reviewed patient's medical and social history as well as events leading to patient's hospitalization. Writer discussed patient's current diagnosis of NSTEMI and management with medications and possible angiogram in the morning. Writer inquired about thoughts regarding cardiopulmonary resuscitation and intubation/mechanical ventilation. Patient and family showed fair insight into medical condition. All questions answered. Pt is FULL CODE

## 2024-04-17 NOTE — H&P ADULT - NSICDXPASTMEDICALHX_GEN_ALL_CORE_FT
PAST MEDICAL HISTORY:  Aortic stenosis     Bladder cancer     CAD (coronary artery disease)     Diabetes     HLD (hyperlipidemia)     HTN (hypertension)

## 2024-04-17 NOTE — H&P ADULT - ASSESSMENT
89yo M with pmhx HTN, CAD, HLD, DM2, mitral valve replacement, bladder cancer s/p TURBT (2023), aortic stenosis s/p TAVR (2017), ulcerative colitis presenting with chest pain. Admitted for ACS

## 2024-04-17 NOTE — H&P ADULT - PROBLEM SELECTOR PLAN 6
Well controlled  -Home regimen: alogliptin 25mg qd, glipizide 10mg BID and metformin 1000mg BID  -Hold oral agents while inpatient  -Low ISS  -Regular finger sticks  -Consistent carb diet  -Hypoglycemic protocol.

## 2024-04-17 NOTE — H&P ADULT - PROBLEM SELECTOR PLAN 3
Baseline per chart review appears to be 1.5  - Cr on admission 1.7  - Monitor BMP  - Avoid nephrotoxic medications as able Baseline per chart review appears to be 1.5 likely CKD   - Cr on admission 1.7  - Monitor BMP  - Avoid nephrotoxic medications as able

## 2024-04-17 NOTE — ED PROVIDER NOTE - CLINICAL SUMMARY MEDICAL DECISION MAKING FREE TEXT BOX
Anterior chest pain radiating across chest with diaphoresis requiring thorough evaluation labs EKG serial troponins imaging.

## 2024-04-17 NOTE — PATIENT PROFILE ADULT - FALL HARM RISK - DEVICES
Doing well overall  Record release form provided  Vaccines patient thinks is utd will fill out record release  Mammogram utd per patient  Colon cancer screening utd per patient thinks 2 years ago  DEXA utd per patient  Hep C screening thinks might be utd    RTC in 3 months for follow up, blood work  
Stable on statin, continue  Refilled today  
Well controlled, continue current medication  Refilled today    
Walker

## 2024-04-17 NOTE — H&P ADULT - NSICDXPASTSURGICALHX_GEN_ALL_CORE_FT
PAST SURGICAL HISTORY:  History of transurethral resection of bladder tumor (TURBT)     Mitral valve replaced     S/P TAVR (transcatheter aortic valve replacement)

## 2024-04-17 NOTE — ED PROVIDER NOTE - CRITICAL CARE ATTENDING CONTRIBUTION TO CARE
This case requires critical care with physician at bedside for approximately 35 to 40 minutes interpreting numerous EKGs enzymes consultation with interventional cardiologist as well as ICU PA and hospitalist.

## 2024-04-17 NOTE — ED PROVIDER NOTE - OBJECTIVE STATEMENT
Patient is a 89-year-old white male very active with history of hypertension diabetes hyperlipidemia and mitral valve replacement years ago as well as bladder CA here now complaining of chest pain.  Patient went to his urologist earlier today and was deemed to be in good condition urologically and was well all day today until approximately 4-5 o'clock he began to develop mid retrosternal chest heaviness that gradually worsened over the course of 1 to 2 hours then began to radiate to both upper arms felt sweaty and slightly short of breath.  States he has never had discomfort like this before.  EMS was called and administered aspirin and nitroglycerin so by the time patient arrived in our emergency department for further evaluation pain was at least 75% gone.  Of note EKG performed by EMS showed sinus rhythm with a left bundle branch block.  Initial EKG upon arrival in our emergency department showed sinus rhythm with left axis deviation and a left bundle branch block with questionable elevated J-point 2 3 aVF.

## 2024-04-17 NOTE — ED PROVIDER NOTE - DIFFERENTIAL DIAGNOSIS
Differential diagnosis includes ACS versus NSTEMI versus musculoskeletal pain versus gastrointestinal disturbance such as reflux or hiatal hernia or esophagitis Differential Diagnosis

## 2024-04-17 NOTE — ED PROVIDER NOTE - PROGRESS NOTE DETAILS
Repeat troponin revealed elevation of troponin now approximately 128.  EKG was repeated which still shows what appears to be a the J-point in leads II, III, aVF elevated but unchanged from the initial EKG.  I reviewed the case with Dr. Antonina Haas who felt that since this patient is hemodynamically stable at this time emergent catheterization would not be indicated but more than likely may require catheterization in the morning but if at any point during the evening and overnight patient becomes unstable in any way he wants to be called immediately.  Will administer Brilinta and heparin.  Dr. Haas stated that he will let Dr. Shipman know about patient so that he could see the patient first thing in the morning. Case was reviewed with ICU PA as well as hospitalist and patient will be admitted to the ICU for close observation and treatment with plan on probable cardiac catheterization in the morning.

## 2024-04-18 ENCOUNTER — RESULT REVIEW (OUTPATIENT)
Age: 89
End: 2024-04-18

## 2024-04-18 LAB
A1C WITH ESTIMATED AVERAGE GLUCOSE RESULT: 7.7 % — HIGH (ref 4–5.6)
ALBUMIN SERPL ELPH-MCNC: 3.4 G/DL — SIGNIFICANT CHANGE UP (ref 3.3–5)
ALP SERPL-CCNC: 56 U/L — SIGNIFICANT CHANGE UP (ref 40–120)
ALT FLD-CCNC: 29 U/L — SIGNIFICANT CHANGE UP (ref 12–78)
ANION GAP SERPL CALC-SCNC: 5 MMOL/L — SIGNIFICANT CHANGE UP (ref 5–17)
APTT BLD: 56.1 SEC — HIGH (ref 24.5–35.6)
APTT BLD: 58.6 SEC — HIGH (ref 24.5–35.6)
APTT BLD: 59 SEC — HIGH (ref 24.5–35.6)
APTT BLD: 60.3 SEC — HIGH (ref 24.5–35.6)
AST SERPL-CCNC: 90 U/L — HIGH (ref 15–37)
BASOPHILS # BLD AUTO: 0.02 K/UL — SIGNIFICANT CHANGE UP (ref 0–0.2)
BASOPHILS NFR BLD AUTO: 0.2 % — SIGNIFICANT CHANGE UP (ref 0–2)
BILIRUB SERPL-MCNC: 0.5 MG/DL — SIGNIFICANT CHANGE UP (ref 0.2–1.2)
BUN SERPL-MCNC: 31 MG/DL — HIGH (ref 7–23)
CALCIUM SERPL-MCNC: 9.6 MG/DL — SIGNIFICANT CHANGE UP (ref 8.5–10.1)
CHLORIDE SERPL-SCNC: 106 MMOL/L — SIGNIFICANT CHANGE UP (ref 96–108)
CK MB BLD-MCNC: 10.9 % — HIGH (ref 0–3.5)
CK MB BLD-MCNC: 11.8 % — HIGH (ref 0–3.5)
CK MB BLD-MCNC: 9.3 % — HIGH (ref 0–3.5)
CK MB CFR SERPL CALC: 85.5 NG/ML — HIGH (ref 0–3.6)
CK MB CFR SERPL CALC: 88.2 NG/ML — HIGH (ref 0–3.6)
CK MB CFR SERPL CALC: 96.9 NG/ML — HIGH (ref 0–3.6)
CK SERPL-CCNC: 747 U/L — HIGH (ref 26–308)
CK SERPL-CCNC: 888 U/L — HIGH (ref 26–308)
CK SERPL-CCNC: 919 U/L — HIGH (ref 26–308)
CO2 SERPL-SCNC: 31 MMOL/L — SIGNIFICANT CHANGE UP (ref 22–31)
CREAT SERPL-MCNC: 1.6 MG/DL — HIGH (ref 0.5–1.3)
EGFR: 39 ML/MIN/1.73M2 — LOW
EOSINOPHIL # BLD AUTO: 0.07 K/UL — SIGNIFICANT CHANGE UP (ref 0–0.5)
EOSINOPHIL NFR BLD AUTO: 0.8 % — SIGNIFICANT CHANGE UP (ref 0–6)
ESTIMATED AVERAGE GLUCOSE: 174 MG/DL — HIGH (ref 68–114)
GLUCOSE SERPL-MCNC: 166 MG/DL — HIGH (ref 70–99)
HCT VFR BLD CALC: 31.8 % — LOW (ref 39–50)
HCT VFR BLD CALC: 32 % — LOW (ref 39–50)
HCT VFR BLD CALC: 33.1 % — LOW (ref 39–50)
HGB BLD-MCNC: 10.7 G/DL — LOW (ref 13–17)
HGB BLD-MCNC: 10.9 G/DL — LOW (ref 13–17)
HGB BLD-MCNC: 11.1 G/DL — LOW (ref 13–17)
IMM GRANULOCYTES NFR BLD AUTO: 0.2 % — SIGNIFICANT CHANGE UP (ref 0–0.9)
LYMPHOCYTES # BLD AUTO: 1.88 K/UL — SIGNIFICANT CHANGE UP (ref 1–3.3)
LYMPHOCYTES # BLD AUTO: 22.2 % — SIGNIFICANT CHANGE UP (ref 13–44)
MAGNESIUM SERPL-MCNC: 2 MG/DL — SIGNIFICANT CHANGE UP (ref 1.6–2.6)
MCHC RBC-ENTMCNC: 31.3 PG — SIGNIFICANT CHANGE UP (ref 27–34)
MCHC RBC-ENTMCNC: 31.3 PG — SIGNIFICANT CHANGE UP (ref 27–34)
MCHC RBC-ENTMCNC: 31.7 PG — SIGNIFICANT CHANGE UP (ref 27–34)
MCHC RBC-ENTMCNC: 33.5 GM/DL — SIGNIFICANT CHANGE UP (ref 32–36)
MCHC RBC-ENTMCNC: 33.6 GM/DL — SIGNIFICANT CHANGE UP (ref 32–36)
MCHC RBC-ENTMCNC: 34.1 GM/DL — SIGNIFICANT CHANGE UP (ref 32–36)
MCV RBC AUTO: 93 FL — SIGNIFICANT CHANGE UP (ref 80–100)
MCV RBC AUTO: 93 FL — SIGNIFICANT CHANGE UP (ref 80–100)
MCV RBC AUTO: 93.2 FL — SIGNIFICANT CHANGE UP (ref 80–100)
MONOCYTES # BLD AUTO: 1 K/UL — HIGH (ref 0–0.9)
MONOCYTES NFR BLD AUTO: 11.8 % — SIGNIFICANT CHANGE UP (ref 2–14)
MRSA PCR RESULT.: SIGNIFICANT CHANGE UP
NEUTROPHILS # BLD AUTO: 5.46 K/UL — SIGNIFICANT CHANGE UP (ref 1.8–7.4)
NEUTROPHILS NFR BLD AUTO: 64.8 % — SIGNIFICANT CHANGE UP (ref 43–77)
NRBC # BLD: 0 /100 WBCS — SIGNIFICANT CHANGE UP (ref 0–0)
PHOSPHATE SERPL-MCNC: 2.9 MG/DL — SIGNIFICANT CHANGE UP (ref 2.5–4.5)
PLATELET # BLD AUTO: 235 K/UL — SIGNIFICANT CHANGE UP (ref 150–400)
PLATELET # BLD AUTO: 236 K/UL — SIGNIFICANT CHANGE UP (ref 150–400)
PLATELET # BLD AUTO: 239 K/UL — SIGNIFICANT CHANGE UP (ref 150–400)
POTASSIUM SERPL-MCNC: 4 MMOL/L — SIGNIFICANT CHANGE UP (ref 3.5–5.3)
POTASSIUM SERPL-SCNC: 4 MMOL/L — SIGNIFICANT CHANGE UP (ref 3.5–5.3)
PROT SERPL-MCNC: 6.9 G/DL — SIGNIFICANT CHANGE UP (ref 6–8.3)
RBC # BLD: 3.42 M/UL — LOW (ref 4.2–5.8)
RBC # BLD: 3.44 M/UL — LOW (ref 4.2–5.8)
RBC # BLD: 3.55 M/UL — LOW (ref 4.2–5.8)
RBC # FLD: 13.2 % — SIGNIFICANT CHANGE UP (ref 10.3–14.5)
RBC # FLD: 13.4 % — SIGNIFICANT CHANGE UP (ref 10.3–14.5)
RBC # FLD: 13.5 % — SIGNIFICANT CHANGE UP (ref 10.3–14.5)
S AUREUS DNA NOSE QL NAA+PROBE: SIGNIFICANT CHANGE UP
SODIUM SERPL-SCNC: 142 MMOL/L — SIGNIFICANT CHANGE UP (ref 135–145)
TROPONIN I, HIGH SENSITIVITY RESULT: 2918 NG/L — HIGH
TROPONIN I, HIGH SENSITIVITY RESULT: HIGH NG/L
WBC # BLD: 8.45 K/UL — SIGNIFICANT CHANGE UP (ref 3.8–10.5)
WBC # BLD: 8.66 K/UL — SIGNIFICANT CHANGE UP (ref 3.8–10.5)
WBC # BLD: 9.5 K/UL — SIGNIFICANT CHANGE UP (ref 3.8–10.5)
WBC # FLD AUTO: 8.45 K/UL — SIGNIFICANT CHANGE UP (ref 3.8–10.5)
WBC # FLD AUTO: 8.66 K/UL — SIGNIFICANT CHANGE UP (ref 3.8–10.5)
WBC # FLD AUTO: 9.5 K/UL — SIGNIFICANT CHANGE UP (ref 3.8–10.5)

## 2024-04-18 PROCEDURE — 93308 TTE F-UP OR LMTD: CPT | Mod: 26,59

## 2024-04-18 PROCEDURE — 99233 SBSQ HOSP IP/OBS HIGH 50: CPT

## 2024-04-18 PROCEDURE — 93010 ELECTROCARDIOGRAM REPORT: CPT

## 2024-04-18 PROCEDURE — 99291 CRITICAL CARE FIRST HOUR: CPT

## 2024-04-18 PROCEDURE — 93306 TTE W/DOPPLER COMPLETE: CPT | Mod: 26

## 2024-04-18 PROCEDURE — 93970 EXTREMITY STUDY: CPT | Mod: 26

## 2024-04-18 PROCEDURE — G0508: CPT

## 2024-04-18 PROCEDURE — 99233 SBSQ HOSP IP/OBS HIGH 50: CPT | Mod: GC

## 2024-04-18 RX ORDER — HYDROMORPHONE HYDROCHLORIDE 2 MG/ML
0.5 INJECTION INTRAMUSCULAR; INTRAVENOUS; SUBCUTANEOUS EVERY 6 HOURS
Refills: 0 | Status: DISCONTINUED | OUTPATIENT
Start: 2024-04-18 | End: 2024-04-21

## 2024-04-18 RX ORDER — ATORVASTATIN CALCIUM 80 MG/1
80 TABLET, FILM COATED ORAL AT BEDTIME
Refills: 0 | Status: DISCONTINUED | OUTPATIENT
Start: 2024-04-18 | End: 2024-04-21

## 2024-04-18 RX ORDER — POLYETHYLENE GLYCOL 3350 17 G/17G
17 POWDER, FOR SOLUTION ORAL DAILY
Refills: 0 | Status: DISCONTINUED | OUTPATIENT
Start: 2024-04-18 | End: 2024-04-21

## 2024-04-18 RX ORDER — HEPARIN SODIUM 5000 [USP'U]/ML
INJECTION INTRAVENOUS; SUBCUTANEOUS
Qty: 25000 | Refills: 0 | Status: DISCONTINUED | OUTPATIENT
Start: 2024-04-18 | End: 2024-04-20

## 2024-04-18 RX ORDER — SENNA PLUS 8.6 MG/1
2 TABLET ORAL AT BEDTIME
Refills: 0 | Status: DISCONTINUED | OUTPATIENT
Start: 2024-04-18 | End: 2024-04-21

## 2024-04-18 RX ADMIN — Medication 2: at 08:07

## 2024-04-18 RX ADMIN — HEPARIN SODIUM UNIT(S)/HR: 5000 INJECTION INTRAVENOUS; SUBCUTANEOUS at 04:23

## 2024-04-18 RX ADMIN — Medication 2: at 17:15

## 2024-04-18 RX ADMIN — HEPARIN SODIUM 800 UNIT(S)/HR: 5000 INJECTION INTRAVENOUS; SUBCUTANEOUS at 18:05

## 2024-04-18 RX ADMIN — Medication 81 MILLIGRAM(S): at 11:04

## 2024-04-18 RX ADMIN — HYDROMORPHONE HYDROCHLORIDE 0.5 MILLIGRAM(S): 2 INJECTION INTRAMUSCULAR; INTRAVENOUS; SUBCUTANEOUS at 09:43

## 2024-04-18 RX ADMIN — HEPARIN SODIUM 800 UNIT(S)/HR: 5000 INJECTION INTRAVENOUS; SUBCUTANEOUS at 19:09

## 2024-04-18 RX ADMIN — Medication 40 MILLIGRAM(S): at 00:51

## 2024-04-18 RX ADMIN — SENNA PLUS 2 TABLET(S): 8.6 TABLET ORAL at 21:30

## 2024-04-18 RX ADMIN — HEPARIN SODIUM 800 UNIT(S)/HR: 5000 INJECTION INTRAVENOUS; SUBCUTANEOUS at 11:27

## 2024-04-18 RX ADMIN — HYDROMORPHONE HYDROCHLORIDE 0.5 MILLIGRAM(S): 2 INJECTION INTRAMUSCULAR; INTRAVENOUS; SUBCUTANEOUS at 10:10

## 2024-04-18 RX ADMIN — ATORVASTATIN CALCIUM 80 MILLIGRAM(S): 80 TABLET, FILM COATED ORAL at 21:30

## 2024-04-18 RX ADMIN — TICAGRELOR 90 MILLIGRAM(S): 90 TABLET ORAL at 17:16

## 2024-04-18 RX ADMIN — TICAGRELOR 90 MILLIGRAM(S): 90 TABLET ORAL at 05:34

## 2024-04-18 RX ADMIN — HEPARIN SODIUM UNIT(S)/HR: 5000 INJECTION INTRAVENOUS; SUBCUTANEOUS at 07:10

## 2024-04-18 RX ADMIN — Medication 2: at 21:49

## 2024-04-18 NOTE — PROGRESS NOTE ADULT - ASSESSMENT
89M PMHx HTN, HLD, T2DM, Bladder CA, MVR presents to ER with chest pain radiating down bilateral arms, admitted for STEMI    ACS    - on heparin gtt, asa and brilinta     - high dose lipitor     - cardio following. plan for medical management for now.      - ECHO pending.      YANETH    - home ACEi held     hematuria  h/o bladder cancer    - monitor hgb     - follow up outpatient with urologist     DVT proph; heparin infusion.     management per ICU

## 2024-04-18 NOTE — DIETITIAN INITIAL EVALUATION ADULT - OTHER INFO
89M PMHx HTN, HLD, T2DM, Bladder CA, MVR presents to ER with chest pain radiating down bilateral arms, admitted for STEMI.    Pt A+Ox4 with spouse at bedside. DashTLC, Consistent Carbohydrate diet rx. Well tolerated for breakfast consuming 100% scrambled eggs and 25% roll. No report N/V. Hx Constipation, encourage dietary fiber/adequate fluids with bowel regimen rx. Pt reports fair appetite/po intake pta though admits decreased over the years; 10lb associated gradual weight loss. UBW 160lbs, current weight 150lbs. Per pt ate a large dinner meal before admission (consisting of hot dog, broccoli, beans). States he overdid it and usually tries to watch salt and sugar in diet. Dx DM on glipizide, metformin pta. Recommend Hgba1c. Current diet appropriate and well tolerated. Pt is not interested in oral nutritional supplements or diet education at this time. Encourage po intake with emphasis on HBV protein sources.  Will remain available prn.

## 2024-04-18 NOTE — DIETITIAN INITIAL EVALUATION ADULT - PROBLEM SELECTOR PLAN 1
S/p ASA 325mg, sublingual nitro, Brilinta load and heparin gtt in ED  - EKG NSR with LAD, LBBB and J point inferior leads  - Troponin 26 -> 128. Trend to peak  - Interventional cardio consulted by ED - heparin gtt, aspirin and Brilinta load  - Admit to MICU for close monitoring   - Plan for possible cath in AM, would keep NPO after midnight  - Management per ICU  - Cardiology ([facility]), consulted

## 2024-04-18 NOTE — CONSULT NOTE ADULT - CRITICAL CARE ATTENDING COMMENT
Upon my evaluation, this patient is at high risk for imminent or life threatening deterioration due to MI  and other active medical issues which require my direct attention, intervention, and personal management.  I have personally spent >30 minutes  of critical care time exclusive of time spent on separate billing procedures. This includes review of laboratory data, radiology results, discussion with primary team\patient, and monitoring for potential decompensation. Interventions were performed as documented above.

## 2024-04-18 NOTE — DIETITIAN INITIAL EVALUATION ADULT - PERTINENT LABORATORY DATA
04-18    142  |  106  |  31<H>  ----------------------------<  166<H>  4.0   |  31  |  1.60<H>    Ca    9.6      18 Apr 2024 05:26  Phos  2.9     04-18  Mg     2.0     04-18    TPro  6.9  /  Alb  3.4  /  TBili  0.5  /  DBili  x   /  AST  90<H>  /  ALT  29  /  AlkPhos  56  04-18  POCT Blood Glucose.: 196 mg/dL (04-18-24 @ 08:04)

## 2024-04-18 NOTE — CARE COORDINATION ASSESSMENT. - NSCAREPROVIDERS_GEN_ALL_CORE_FT
CARE PROVIDERS:  Accepting Physician: Mini Lyons  Access Services: Fei Sunshine  Admitting: BruceMini  Attending: Mini Lyons  Cardiology Technician: Tonya Valladares  Case Management: Anu Kaplan  Consultant: Irma Hernandez  Consultant: Bennett Connors  Consultant: Weil, Patricia  Consultant: Bg Mena  Consultant: Cher Duckworth  Consultant: Larisa Smith  Consultant: Dirk Hussein  ED Attending: Yoel Noonan ED Nurse: Em Wei  Nurse: Pascale Castle  Nurse: Malissa Yeboah  Nurse: Jaye Haskins  Nurse: Meseret Hernandez  Nurse: Carey Andujar  Ordered: Doctor, Unknown  Ordered: ADM, User  Override: Keshia Soto  PCA/Nursing Assistant: Halima Hankins  Primary Team: Carleen De La Cruz  Primary Team: Sanna Elliott  Primary Team: Jose Shipman  Primary Team: Vignesh Valero  Primary Team: Mini Lyons  Primary Team: Rome Whiting  Primary Team: Narayan Lea  Primary Team: Debby Turner  Primary Team: Luis Carlos Vazquez  Registered Dietitian: Meagan Lind  Registered Dietitian: Kavitha Morgan  Respiratory Therapy: Filomena Perez  : Pauline Chamorro  Student: Laurie Lind  Team: [place]-[facility], Team  UR// Supp. Assoc.: Milton Pollock

## 2024-04-18 NOTE — CARE COORDINATION ASSESSMENT. - OTHER PERTINENT DISCHARGE PLANNING INFORMATION:
CM met with patient  at bedside to explain role and transitions of care. Patient lives with spouse  in [facility] independent living in a private house with 13 steps to get in and 0 to the second floor.  Patient was fully independent prior to admission.  No caregiver or home care identified. DMEs in home include RW and cane.  Family will transport patient home when ready to be discharge. CM explained  home care expectation, process, insurance provision and home safety with good understanding. CM to make referral. Patient  verbalized understanding of plans after discharge and is in agreement.  Resource folder left at bedside.  All questions answered to the best of my abilities.  CM remains available throughout the hospital stay.

## 2024-04-18 NOTE — CARE COORDINATION ASSESSMENT. - NSPASTMEDSURGHISTORY_GEN_ALL_CORE_FT
PAST MEDICAL & SURGICAL HISTORY:  Diabetes      Aortic stenosis      Bladder cancer      HLD (hyperlipidemia)      CAD (coronary artery disease)      HTN (hypertension)      History of transurethral resection of bladder tumor (TURBT)      Mitral valve replaced      S/P TAVR (transcatheter aortic valve replacement)

## 2024-04-18 NOTE — DIETITIAN INITIAL EVALUATION ADULT - NS FNS DIET ORDER
Diet, DASH/TLC:   Sodium & Cholesterol Restricted  Consistent Carbohydrate {No Snacks} (04-17-24 @ 21:50)

## 2024-04-18 NOTE — PROGRESS NOTE ADULT - ASSESSMENT
89M PMHx HTN, HLD, T2DM, Bladder CA, MVR presents to ER with chest pain radiating down bilateral arms since this afternoon.   Assessment:  1. NSTEMI  2. LBBB  3. YANETH on CKD    Plan:    NEURO:   - No acute needs    CV:   -NSTEMI, ASA/brillinta loaded and on heparin drip per interventional cardiology recs  - EKG NSR with LAD, NEW LBBB and J point inferior leads  - Trend troponin to peak, f/u repeat this AM  - Plan for cath today  - f/u TTE, duplex US  - c/w home statin, hold home ACE 2/2 elevated Cr    PULM:   - No acute needs    GI:   -DASH/TLC Diet.    RENAL:   - Elevated Cr at 1.7 (baseline Cr ~1.5), improving today  - Hold home ACE  - Continue to monitor       ENDO:   - Hold home oral medicatons  - LDISS  - Goal -180 in ICU    ID:   - No acute needs    HEME:   -DVT ppcx: Heparin Infusion.    GOC: Full Code.   89M PMHx HTN, HLD, T2DM, Bladder CA, MVR presents to ER with chest pain radiating down bilateral arms, admitted for STEMI.    Plan:    NEURO:   - No acute needs    CV:   - STEMI, ASA/brillinta loaded and on heparin drip per interventional cardiology recs  - EKG on admission NSR with LAD, NEW LBBB and J point inferior lead  - Reviewed EKG w/ cardio Dr. Connors, likely STEMI (inferior wall) based off sgarbossa's criteria  - No plan for cath, repeat EKGs reviewed and seems that pt re-perfused. F/u repeat EKG  - Trend cardiac enzymes to peak, f/u repeat this AM  - f/u TTE, duplex US  - Dilaudid .5mg IV q4h PRN for chest pain  - C/w ASA and brillinta. Change home statin to atorvastatin 80mg qd    PULM:   - No acute needs    GI:   -DASH/TLC     RENAL:   - Elevated Cr at 1.7 (baseline Cr ~1.5), improving today  - Hold home ACE  - Continue to monitor       ENDO:   - Hold home oral medications  - LDISS  - Goal -180 in ICU    ID:   - No acute needs    HEME:   -DVT ppx: Heparin Infusion.    GOC: Full Code.   89M PMHx HTN, HLD, T2DM, Bladder CA, MVR presents to ER with chest pain radiating down bilateral arms, admitted for STEMI.    Plan:    NEURO:   - No acute needs    CV:   - STEMI, ASA/brillinta loaded and on heparin drip per interventional cardiology recs  - EKG on admission NSR with LAD, NEW LBBB and J point inferior lead  - Reviewed EKG w/ cardio Dr. Connors, likely STEMI (inferior wall) based off sgarbossa's criteria  - No plan for cath, repeat EKGs reviewed and seems that pt re-perfused. F/u repeat EKG  - Trend cardiac enzymes to peak, f/u repeat   - f/u TTE, duplex US negative  - Dilaudid .5mg IV q4h PRN for chest pain  - C/w ASA and brillinta. Change home statin to atorvastatin 80mg qd    PULM:   - No acute needs    GI:   - DASH/TLC     RENAL:   - Elevated Cr at 1.7 (baseline Cr ~1.5), improving today  - Hold home ACE  - Continue to monitor       ENDO:   - Hold home oral medications  - LDISS  - Goal -180 in ICU    ID:   - No acute needs    HEME:   -DVT ppx: Heparin Infusion.    GOC: Full Code    Dr. Turner discussed plan with family (and son-in law over phone) at bedside   89M PMHx HTN, HLD, T2DM, Bladder CA, MVR presents to ER with chest pain radiating down bilateral arms, admitted for STEMI.    Plan:    NEURO:   - No acute needs    CV:   - STEMI, ASA/brillinta loaded and on heparin drip per interventional cardiology recs  - EKG on admission NSR with LAD, NEW LBBB and J point inferior lead  - Reviewed EKG w/ cardio Dr. Connors, likely STEMI (inferior wall) based off sgarbossa's criteria  - No plan for cath, repeat EKGs reviewed and seems that pt re-perfused. F/u repeat EKG  - Trend cardiac enzymes to peak, f/u repeat   - f/u TTE, duplex US negative  - Dilaudid .5mg IV q4h PRN for chest pain  - C/w ASA and brillinta. Change home statin to atorvastatin 80mg qd    PULM:   - No acute needs    GI:   - DASH/TLC     RENAL:   - Elevated Cr at 1.7 (baseline Cr ~1.5), improving today  - Hold home ACE  - Continue to monitor       ENDO:   - Hold home oral medications  - LDISS  - Goal -180 in ICU    ID:   - No acute needs    HEME:   - Hg low-normal, appears near baseline. Pt denies hematuria (has had hx of hematuria with his bladder cancer)  - Per family at bedside pt had cystoscopy yesterday showing potential recurrence of bladder cancer  -DVT ppx: Heparin Infusion.    GOC: Full Code    Dr. Turner discussed plan with family (and son-in law over phone) at bedside

## 2024-04-18 NOTE — CONSULT NOTE ADULT - SUBJECTIVE AND OBJECTIVE BOX
CHIEF COMPLAINT: Patient is a 89y old  Male who presents with a chief complaint of Acute ischemic heart disease     (18 Apr 2024 11:04)      HPI:  89yo M with pmhx HTN, CAD, HLD, DM2, mitral valve replacement, bladder cancer s/p TURBT (2023), aortic stenosis s/p TAVR (2017), ulcerative colitis presenting with chest pain. Patient states around 4pm he developed mid retrosternal chest heaviness after eating a large lunch consisting of hot dogs, pork and vegetables. He states it radiated to b/l UE and he became diaphoretic and short of breath, didn't improve after 2hrs. EMS was called and he received 325mg aspirin, sublingual nitro en route. On arrival to the ED patient states his chest heaviness has greatly improved.  IN THE ED  VS: T 98, HR 76, /75, RR 18, SpO2 99% on RA  Labs: H/H 12.1/36, Trop 26 -> 128, BUN/Cr 32/1.7  Imaging: CXR on personal read with b/i infiltrates  EKG: NSR, LAD, LBBB with J point inferior leads  S/p Aspirin 325mg, sublingual nitro, Brilinta 180mg, heparin gtt (17 Apr 2024 22:07)    Interval hx: he is still feeling a 1-2/10 chest pain that is much improved from previous. He states that he is very comfortable Denies any  dyspnea, palpitations, PND, orthopnea, near syncope, syncope, lower extremity edema, stroke like symptoms.     EKG initial  SR with LBBB with >2m discordant LAINE inferiorly  repeat EKG with reduction of LAINE inferiorly    REVIEW OF SYSTEMS:   All other review of systems are negative unless indicated above    PAST MEDICAL & SURGICAL HISTORY:  Diabetes      HTN (hypertension)      CAD (coronary artery disease)      HLD (hyperlipidemia)      Bladder cancer      Aortic stenosis      S/P TAVR (transcatheter aortic valve replacement)      Mitral valve replaced      History of transurethral resection of bladder tumor (TURBT)          SOCIAL HISTORY:  No tobacco, ethanol, or drug abuse.    FAMILY HISTORY:  No pertinent family history in first degree relatives      No family history of acute MI or sudden cardiac death.    MEDICATIONS  (STANDING):  aspirin enteric coated 81 milliGRAM(s) Oral daily  atorvastatin 80 milliGRAM(s) Oral at bedtime  dextrose 50% Injectable 25 Gram(s) IV Push once  dextrose 50% Injectable 12.5 Gram(s) IV Push once  dextrose 50% Injectable 25 Gram(s) IV Push once  dextrose Oral Gel 15 Gram(s) Oral once  glucagon  Injectable 1 milliGRAM(s) IntraMuscular once  heparin  Infusion.  Unit(s)/Hr (8 mL/Hr) IV Continuous <Continuous>  insulin lispro (ADMELOG) corrective regimen sliding scale   SubCutaneous Before meals and at bedtime  polyethylene glycol 3350 17 Gram(s) Oral daily  senna 2 Tablet(s) Oral at bedtime  ticagrelor 90 milliGRAM(s) Oral every 12 hours    MEDICATIONS  (PRN):  heparin   Injectable 4100 Unit(s) IV Push every 6 hours PRN For aPTT less than 40  HYDROmorphone  Injectable 0.5 milliGRAM(s) IV Push every 6 hours PRN Moderate Pain (4 - 6)      Allergies    Cipro (Other)  simvastatin (Other)  lovastatin (Other)  sulfa drugs (Other)    Intolerances        Home meds:  Home Medications:  alogliptin 25 mg oral tablet: 1 tab(s) orally once a day (17 Apr 2024 22:06)  aspirin 81 mg oral tablet: 1 tab(s) orally once a day (17 Apr 2024 22:07)  furosemide 40 mg oral tablet: 1 tab(s) orally once a day (17 Apr 2024 22:07)  glipiZIDE 10 mg oral tablet: 1 tab(s) orally 2 times a day (17 Apr 2024 22:05)  lisinopril 5 mg oral tablet: 1 tab(s) orally once a day (17 Apr 2024 22:07)  metFORMIN 1000 mg oral tablet: 1 tab(s) orally 2 times a day (17 Apr 2024 22:07)  pravastatin 40 mg oral tablet: 1 tab(s) orally once a day (17 Apr 2024 22:07)  Vitamin B12 1000 mcg oral tablet: 1 tab(s) orally once a day (17 Apr 2024 22:31)        VITAL SIGNS:   Vital Signs Last 24 Hrs  T(C): 37 (18 Apr 2024 12:09), Max: 37 (18 Apr 2024 12:09)  T(F): 98.6 (18 Apr 2024 12:09), Max: 98.6 (18 Apr 2024 12:09)  HR: 68 (18 Apr 2024 11:00) (62 - 82)  BP: 139/81 (18 Apr 2024 11:00) (130/60 - 174/80)  BP(mean): 98 (18 Apr 2024 11:00) (87 - 98)  RR: 18 (18 Apr 2024 11:00) (15 - 40)  SpO2: 97% (18 Apr 2024 11:00) (96% - 100%)    Parameters below as of 18 Apr 2024 06:00  Patient On (Oxygen Delivery Method): room air        I&O's Summary    17 Apr 2024 07:01  -  18 Apr 2024 07:00  --------------------------------------------------------  IN: 64 mL / OUT: 1300 mL / NET: -1236 mL    18 Apr 2024 07:01  -  18 Apr 2024 13:03  --------------------------------------------------------  IN: 16 mL / OUT: 500 mL / NET: -484 mL        On Exam:  TELE: SR  Constitutional: NAD, awake    HEENT: Moist Mucous Membranes, Anicteric  Pulmonary: Decreased breath sounds b/l. No rales, crackles or wheeze appreciated.   Cardiovascular: Regular, S1 and S2, No murmurs, rubs, gallops or clicks  Gastrointestinal: Bowel Sounds present, soft, nontender.   Lymph: No peripheral edema. No lymphadenopathy.  Skin: No visible rashes or ulcers.  Psych:  Mood & affect appropriate    LABS: All Labs Reviewed:                        11.1   8.45  )-----------( 235      ( 18 Apr 2024 05:26 )             33.1                         10.7   8.66  )-----------( 236      ( 18 Apr 2024 03:50 )             31.8                         12.1   9.23  )-----------( 256      ( 17 Apr 2024 18:30 )             36.0     18 Apr 2024 05:26    142    |  106    |  31     ----------------------------<  166    4.0     |  31     |  1.60   17 Apr 2024 18:30    144    |  108    |  32     ----------------------------<  179    4.8     |  28     |  1.70     Ca    9.6        18 Apr 2024 05:26  Ca    9.8        17 Apr 2024 18:30  Phos  2.9       18 Apr 2024 05:26  Mg     2.0       18 Apr 2024 05:26  Mg     2.2       17 Apr 2024 18:30    TPro  6.9    /  Alb  3.4    /  TBili  0.5    /  DBili  x      /  AST  90     /  ALT  29     /  AlkPhos  56     18 Apr 2024 05:26  TPro  8.0    /  Alb  3.8    /  TBili  0.5    /  DBili  x      /  AST  24     /  ALT  27     /  AlkPhos  63     17 Apr 2024 18:30    PT/INR - ( 17 Apr 2024 18:30 )   PT: 11.4 sec;   INR: 0.97 ratio         PTT - ( 18 Apr 2024 10:20 )  PTT:59.0 sec  CARDIAC MARKERS ( 18 Apr 2024 09:00 )  x     / x     / 747 U/L / x     / 88.2 ng/mL    - Troponin: <-52374.4, <-64620.2, <-2918.0, <-128.2, <-26.4  Blood Culture:           RADIOLOGY:  < from: US Duplex Venous Lower Ext Complete, Bilateral (04.18.24 @ 09:06) >    ACC: [id number] EXAM:  US DPLX LWR EXT VEINS COMPL BI   ORDERED BY: DUANE DOS SANTOS     PROCEDURE DATE:  04/18/2024          INTERPRETATION:  CLINICAL INFORMATION: Edema.    COMPARISON: None available.    TECHNIQUE: Duplex sonography of the BILATERAL LOWER extremity veins with   color and spectral Doppler, with and without compression.    FINDINGS:    RIGHT:  Normal compressibility of the RIGHT common femoral, femoral and popliteal   veins.  Doppler examination shows normal spontaneous and phasic flow.  No RIGHT calf vein thrombosis is detected.    Subcutaneous edema in the right calf.    LEFT:  Normal compressibility of the LEFT common femoral, femoral and popliteal   veins.  Doppler examination shows normal spontaneous and phasic flow.  NoLEFT calf vein thrombosis is detected.    Left popliteal fossa cyst measuring 2.2 x 1.2 x 1.3 cm.    Subcutaneous edema in the left calf, less than on the right.    IMPRESSION:  No evidence of deep venous thrombosis in either lower extremity.            --- End of Report ---            AL LOVE MD; Attending Radiologist  This document has been electronically signed. Apr 18 2024  9:11AM    < end of copied text >       < from: Xray Chest 1 View- PORTABLE-Urgent (04.17.24 @ 20:04) >    IMPRESSION:    No radiographic evidence of active chest disease..    < end of copied text >        
Patient is a 89y old  Male who presents with a chief complaint of     BRIEF HOSPITAL COURSE:   89M PMHx HTN, HLD, T2DM, Bladder CA, MVR presents to ER with chest pain radiating down bilateral arms since this afternoon.   EKG NSR with LBBB. Troponin elevated to 128.2. Labs in ER also significant for BUN/SCr 32/1.70.   Interventional Cardiology consulted and recommended loading with ASA/Brillinta and start on Heparin Infusion. Plan for cath in AM.  MICU consulted for further management.     PAST MEDICAL & SURGICAL HISTORY:  Diabetes  No significant past surgical history    Review of Systems:  CONSTITUTIONAL: No fever, chills, or fatigue  EYES: No eye pain, visual disturbances, or discharge  ENMT:  No difficulty hearing, tinnitus, vertigo; No sinus or throat pain  NECK: No pain or stiffness  RESPIRATORY: No cough, wheezing, chills or hemoptysis; No shortness of breath  CARDIOVASCULAR: (+) chest pain; no palpitations, dizziness, or leg swelling  GASTROINTESTINAL: No abdominal or epigastric pain. No nausea, vomiting, or hematemesis; No diarrhea or constipation. No melena or hematochezia.  GENITOURINARY: No dysuria, frequency, hematuria, or incontinence  NEUROLOGICAL: No headaches, memory loss, loss of strength, numbness, or tremors  SKIN: No itching, burning, rashes, or lesions   MUSCULOSKELETAL: No joint pain or swelling; No muscle, back, or extremity pain  PSYCHIATRIC: No depression, anxiety, mood swings, or difficulty sleeping    Medications:  heparin   Injectable 4100 Unit(s) IV Push every 6 hours PRN  heparin   Injectable 4100 Unit(s) IV Push once  heparin  Infusion.  Unit(s)/Hr IV Continuous <Continuous>    ICU Vital Signs Last 24 Hrs  T(C): 36.7 (17 Apr 2024 18:23), Max: 36.7 (17 Apr 2024 18:23)  T(F): 98 (17 Apr 2024 18:23), Max: 98 (17 Apr 2024 18:23)  HR: 76 (17 Apr 2024 18:50) (76 - 78)  BP: 166/75 (17 Apr 2024 18:50) (166/75 - 174/80)  BP(mean): --  ABP: --  ABP(mean): --  RR: 18 (17 Apr 2024 18:50) (16 - 18)  SpO2: 99% (17 Apr 2024 18:50) (99% - 100%)  O2 Parameters below as of 17 Apr 2024 18:50  Patient On (Oxygen Delivery Method): room air    I&O's Detail    LABS:                      12.1   9.23  )-----------( 256      ( 17 Apr 2024 18:30 )             36.0     04-17  144  |  108  |  32<H>  ----------------------------<  179<H>  4.8   |  28  |  1.70<H>  Ca    9.8      17 Apr 2024 18:30  Mg     2.2     04-17  TPro  8.0  /  Alb  3.8  /  TBili  0.5  /  DBili  x   /  AST  24  /  ALT  27  /  AlkPhos  63  04-17    CAPILLARY BLOOD GLUCOSE    PT/INR - ( 17 Apr 2024 18:30 )   PT: 11.4 sec;   INR: 0.97 ratio    PTT - ( 17 Apr 2024 18:30 )  PTT:26.9 sec    Urinalysis Basic - ( 17 Apr 2024 18:30 )  Color: x / Appearance: x / SG: x / pH: x  Gluc: 179 mg/dL / Ketone: x  / Bili: x / Urobili: x   Blood: x / Protein: x / Nitrite: x   Leuk Esterase: x / RBC: x / WBC x   Sq Epi: x / Non Sq Epi: x / Bacteria: x    CULTURES:    Physical Examination:  General: Alert, oriented, interactive, nonfocal.  HEENT: PERRL.  NECK: Supple.   PULM: Clear to auscultation bilaterally.  CVS: s1/s2.  ABD: Soft, nondistended, nontender, normoactive bowel sounds.  EXT: Bilateral 2+ pitting edema, nontender.  SKIN: Warm.    RADIOLOGY:     Time spent on this patient encounter, which includes documenting this note in the electronic medical record, was +75 minutes including assessing the presenting problems with associated risks, reviewing the medical record to prepare for the encounter, and meeting face to face with the patient to obtain additional history. I have also performed an appropriate physical exam, made interventions listed and ordered and interpreted appropriate diagnostic studies as documented. To improve communication and patient safety, I have coordinated care with the multidisciplinary team including the bedside nurse, appropriate attending of record and consultants as needed. This time is independent of any procedures performed.    Date of entry of this note is equal to the date of services rendered.

## 2024-04-18 NOTE — DIETITIAN INITIAL EVALUATION ADULT - ADD RECOMMEND
Current diet appropriate and well tolerated. Encourage compliance. Recommend MVI with minerals daily.

## 2024-04-18 NOTE — DIETITIAN INITIAL EVALUATION ADULT - ETIOLOGY
related to decreased ability to consume sufficient energy/advanced age related to cardiac dysfunction

## 2024-04-18 NOTE — DIETITIAN INITIAL EVALUATION ADULT - PROBLEM SELECTOR PLAN 3
Baseline per chart review appears to be 1.5 likely CKD   - Cr on admission 1.7  - Monitor BMP  - Avoid nephrotoxic medications as able

## 2024-04-18 NOTE — CONSULT NOTE ADULT - ASSESSMENT
89yo M with pmhx HTN, CAD, HLD, DM2, mitral valve replacement, aortic stenosis s/p TAVR (2017), bladder cancer s/p TURBT (2023), , ulcerative colitis presenting with chest pain and ST changes on EKG     - initial EKG with SR LBBB with discordant LAINE >2mm in inferior leads. After initiation of therapy in ED, LAINE seem to have improved and pts symptoms better.   - likely pt presented with MI which is supported by cardiac biomarker trend.   - I spoke to pt regarding coronary angiography and risk. Given his age, resolution of EKG changes, CKD, will medically manage for now. If sx change will move forward with urgent coronary cath. Case discussed with Dr. Shipman who is in agreement.   - trend full set of Nadia  - cont DAPT.   - hep gtt for 40 hours  - echo.  - lipitro 80mg HS  - Add toprol 25mg Qday  - Fentanyl PRN for pain  - if pain cont can try nitro sl. may need long acting nitrates in future.   - Appears compensated from HF POV. hold diuretics.     - monitor for hematuria given recent bladder procedure. would fu with outpt   - Monitor and replete electrolytes. Keep K>4.0 and Mg>2.0.   - Further cardiac workup will depend on clinical course.

## 2024-04-18 NOTE — PHARMACOTHERAPY INTERVENTION NOTE - COMMENTS
Patient is 89y M presenting with suspected STEMI, complaining of chest pain. Patient also with YANETH, SCr = 1.6, CrCl = 24 mL/min. Discussed with ICU team, recommended low dose hydromorphone PRN instead of morphine in setting of poor renal function.

## 2024-04-18 NOTE — PROGRESS NOTE ADULT - ATTENDING COMMENTS
89 year old male with history of HTN, HLD, DM2, MVR, and bladder cancer admitted with STEMI. Cardiology following. Chest pain improved. Manage conservatively for now.    Pain control - hydromorphine 0.5 mg Q6H PRN  ASA, Brilinta, Atorvastatin  Heparin gtt  Serial cardiac enzymes and EKG  Cardiology follow up

## 2024-04-18 NOTE — PROVIDER CONTACT NOTE (EICU) - ASSESSMENT
VSS  s/p Aspirin / brilinta  Heparin gtt  NPO for Cath  Trend Cardiac enzyme.  Formal TTE  Hold abx for now. observe for signs of infection.  Strict I/O's and Lyte repletion prn  Daily EKGs  Cardiology to follow

## 2024-04-18 NOTE — PROVIDER CONTACT NOTE (EICU) - BACKGROUND
89M PMHx HTN, HLD, T2DM, Bladder CA, MVR presents to ER with chest pain radiating down bilateral arms since this afternoon. Admitted for:  1. NSTEMI  2. LBBB  3. YANETH on CKD

## 2024-04-18 NOTE — DIETITIAN INITIAL EVALUATION ADULT - PERTINENT MEDS FT
MEDICATIONS  (STANDING):  aspirin enteric coated 81 milliGRAM(s) Oral daily  atorvastatin 80 milliGRAM(s) Oral at bedtime  dextrose 50% Injectable 25 Gram(s) IV Push once  dextrose 50% Injectable 12.5 Gram(s) IV Push once  dextrose 50% Injectable 25 Gram(s) IV Push once  dextrose Oral Gel 15 Gram(s) Oral once  glucagon  Injectable 1 milliGRAM(s) IntraMuscular once  insulin lispro (ADMELOG) corrective regimen sliding scale   SubCutaneous Before meals and at bedtime  ticagrelor 90 milliGRAM(s) Oral every 12 hours    MEDICATIONS  (PRN):  heparin   Injectable 4100 Unit(s) IV Push every 6 hours PRN For aPTT less than 40  HYDROmorphone  Injectable 0.5 milliGRAM(s) IV Push every 6 hours PRN Moderate Pain (4 - 6)

## 2024-04-18 NOTE — PATIENT CHOICE NOTE. - NSPTCHOICESTATE_GEN_ALL_CORE

## 2024-04-18 NOTE — PROGRESS NOTE ADULT - SUBJECTIVE AND OBJECTIVE BOX
Interval Events: Pt seen and examined at bedside. Pt endorses improvement in his chest pain, now endorsing mild epigastric chest pain which no longer radiates to arms. Pt aware of plan for cardiac cath today with IR.     Review of Systems:  Constitutional: No fever/chills  Neuro: No headache, numbness, weakness  Resp: No SOB, cough, wheezing  CVS: +chest pain (improved). No palpitations  GI: No abdominal pain, nausea, vomiting      ICU Vital Signs Last 24 Hrs  T(C): 36.6 (18 Apr 2024 07:46), Max: 36.8 (17 Apr 2024 22:20)  T(F): 97.9 (18 Apr 2024 07:46), Max: 98.3 (18 Apr 2024 00:02)  HR: 62 (18 Apr 2024 08:00) (62 - 82)  BP: 143/66 (18 Apr 2024 08:00) (130/61 - 174/80)  BP(mean): 95 (18 Apr 2024 08:00) (88 - 95)  ABP: --  ABP(mean): --  RR: 18 (18 Apr 2024 08:00) (16 - 40)  SpO2: 99% (18 Apr 2024 08:00) (96% - 100%)    O2 Parameters below as of 18 Apr 2024 06:00  Patient On (Oxygen Delivery Method): room air              04-17-24 @ 07:01  -  04-18-24 @ 07:00  --------------------------------------------------------  IN: 64 mL / OUT: 1300 mL / NET: -1236 mL    04-18-24 @ 07:01  -  04-18-24 @ 09:01  --------------------------------------------------------  IN: 16 mL / OUT: 500 mL / NET: -484 mL        CAPILLARY BLOOD GLUCOSE      POCT Blood Glucose.: 196 mg/dL (18 Apr 2024 08:04)      I&O's Summary    17 Apr 2024 07:01  -  18 Apr 2024 07:00  --------------------------------------------------------  IN: 64 mL / OUT: 1300 mL / NET: -1236 mL    18 Apr 2024 07:01  -  18 Apr 2024 09:01  --------------------------------------------------------  IN: 16 mL / OUT: 500 mL / NET: -484 mL        Physical Exam:   Gen: Awake, NAD. Appears younger than stated age. Laying in bed with HOB elevated. US techs at bedside  Neuro: A&Ox3. No focal defecits. Answers questions and follows commands.  HEENT: +erythematous lesion on superior aspect of skull. PERRLA.  Resp: CTA B/L. No wheezes, crackles or rhonchi  CVS: +s1/s2. RRR. No murmurs  Abd: Soft. NT/ND. +BS  Ext: No edema.     Meds:      dextrose 50% Injectable IV Push  dextrose 50% Injectable IV Push  dextrose 50% Injectable IV Push  dextrose Oral Gel Oral  glucagon  Injectable IntraMuscular  insulin lispro (ADMELOG) corrective regimen sliding scale SubCutaneous  pravastatin Oral          aspirin enteric coated Oral  heparin   Injectable IV Push PRN  heparin  Infusion. IV Continuous  ticagrelor Oral                                        11.1   8.45  )-----------( 235      ( 18 Apr 2024 05:26 )             33.1       04-18    142  |  106  |  31<H>  ----------------------------<  166<H>  4.0   |  31  |  1.60<H>    Ca    9.6      18 Apr 2024 05:26  Phos  2.9     04-18  Mg     2.0     04-18    TPro  6.9  /  Alb  3.4  /  TBili  0.5  /  DBili  x   /  AST  90<H>  /  ALT  29  /  AlkPhos  56  04-18          PT/INR - ( 17 Apr 2024 18:30 )   PT: 11.4 sec;   INR: 0.97 ratio         PTT - ( 18 Apr 2024 05:26 )  PTT:58.6 sec  Urinalysis Basic - ( 18 Apr 2024 05:26 )    Color: x / Appearance: x / SG: x / pH: x  Gluc: 166 mg/dL / Ketone: x  / Bili: x / Urobili: x   Blood: x / Protein: x / Nitrite: x   Leuk Esterase: x / RBC: x / WBC x   Sq Epi: x / Non Sq Epi: x / Bacteria: x      Tubes/Lines: Peripheral IV      GLOBAL ISSUE/BEST PRACTICE:  Analgesia: N  Sedation: N  HOB elevation: Y  Stress ulcer prophylaxis:  VTE prophylaxis: Y  Glycemic control: Y  Nutrition: Y (NPO after midnight)    CODE STATUS: Full Code       Interval Events: Pt seen and examined at bedside. Pt endorses improvement in his chest pain, now endorsing mild 2/10 epigastric chest pain which no longer radiates to arms.     Review of Systems:  Constitutional: No fever/chills  Neuro: No headache, numbness, weakness  Resp: No SOB, cough, wheezing  CVS: +chest pain (improved). No palpitations  GI: No abdominal pain, nausea, vomiting      ICU Vital Signs Last 24 Hrs  T(C): 36.6 (18 Apr 2024 07:46), Max: 36.8 (17 Apr 2024 22:20)  T(F): 97.9 (18 Apr 2024 07:46), Max: 98.3 (18 Apr 2024 00:02)  HR: 62 (18 Apr 2024 08:00) (62 - 82)  BP: 143/66 (18 Apr 2024 08:00) (130/61 - 174/80)  BP(mean): 95 (18 Apr 2024 08:00) (88 - 95)  ABP: --  ABP(mean): --  RR: 18 (18 Apr 2024 08:00) (16 - 40)  SpO2: 99% (18 Apr 2024 08:00) (96% - 100%)    O2 Parameters below as of 18 Apr 2024 06:00  Patient On (Oxygen Delivery Method): room air              04-17-24 @ 07:01  -  04-18-24 @ 07:00  --------------------------------------------------------  IN: 64 mL / OUT: 1300 mL / NET: -1236 mL    04-18-24 @ 07:01  -  04-18-24 @ 09:01  --------------------------------------------------------  IN: 16 mL / OUT: 500 mL / NET: -484 mL        CAPILLARY BLOOD GLUCOSE      POCT Blood Glucose.: 196 mg/dL (18 Apr 2024 08:04)      I&O's Summary    17 Apr 2024 07:01  -  18 Apr 2024 07:00  --------------------------------------------------------  IN: 64 mL / OUT: 1300 mL / NET: -1236 mL    18 Apr 2024 07:01  -  18 Apr 2024 09:01  --------------------------------------------------------  IN: 16 mL / OUT: 500 mL / NET: -484 mL        Physical Exam:   Gen: Awake, NAD. Appears younger than stated age. Laying in bed with HOB elevated. US techs at bedside  Neuro: A&Ox3. No focal defecits. Answers questions and follows commands.  HEENT: +erythematous lesion on superior aspect of skull. PERRLA.  Resp: CTA B/L. No wheezes, crackles or rhonchi  CVS: +s1/s2. RRR. No murmurs  Abd: Soft. NT/ND. +BS  Ext: Trace edema B/L     Meds:      dextrose 50% Injectable IV Push  dextrose 50% Injectable IV Push  dextrose 50% Injectable IV Push  dextrose Oral Gel Oral  glucagon  Injectable IntraMuscular  insulin lispro (ADMELOG) corrective regimen sliding scale SubCutaneous  pravastatin Oral          aspirin enteric coated Oral  heparin   Injectable IV Push PRN  heparin  Infusion. IV Continuous  ticagrelor Oral                                        11.1   8.45  )-----------( 235      ( 18 Apr 2024 05:26 )             33.1       04-18    142  |  106  |  31<H>  ----------------------------<  166<H>  4.0   |  31  |  1.60<H>    Ca    9.6      18 Apr 2024 05:26  Phos  2.9     04-18  Mg     2.0     04-18    TPro  6.9  /  Alb  3.4  /  TBili  0.5  /  DBili  x   /  AST  90<H>  /  ALT  29  /  AlkPhos  56  04-18          PT/INR - ( 17 Apr 2024 18:30 )   PT: 11.4 sec;   INR: 0.97 ratio         PTT - ( 18 Apr 2024 05:26 )  PTT:58.6 sec  Urinalysis Basic - ( 18 Apr 2024 05:26 )    Color: x / Appearance: x / SG: x / pH: x  Gluc: 166 mg/dL / Ketone: x  / Bili: x / Urobili: x   Blood: x / Protein: x / Nitrite: x   Leuk Esterase: x / RBC: x / WBC x   Sq Epi: x / Non Sq Epi: x / Bacteria: x      Tubes/Lines: Peripheral IV      GLOBAL ISSUE/BEST PRACTICE:  Analgesia: N  Sedation: N  HOB elevation: Y  Stress ulcer prophylaxis: N  VTE prophylaxis: Y  Glycemic control: Y  Nutrition: Y (DASH TLC)    CODE STATUS: Full Code

## 2024-04-19 LAB
ANION GAP SERPL CALC-SCNC: 5 MMOL/L — SIGNIFICANT CHANGE UP (ref 5–17)
APTT BLD: 47.6 SEC — HIGH (ref 24.5–35.6)
APTT BLD: 62.2 SEC — HIGH (ref 24.5–35.6)
APTT BLD: 66 SEC — HIGH (ref 24.5–35.6)
BUN SERPL-MCNC: 26 MG/DL — HIGH (ref 7–23)
CALCIUM SERPL-MCNC: 9.6 MG/DL — SIGNIFICANT CHANGE UP (ref 8.5–10.1)
CHLORIDE SERPL-SCNC: 105 MMOL/L — SIGNIFICANT CHANGE UP (ref 96–108)
CK MB BLD-MCNC: 3.3 % — SIGNIFICANT CHANGE UP (ref 0–3.5)
CK MB BLD-MCNC: 4.8 % — HIGH (ref 0–3.5)
CK MB CFR SERPL CALC: 14.4 NG/ML — HIGH (ref 0–3.6)
CK MB CFR SERPL CALC: 29.9 NG/ML — HIGH (ref 0–3.6)
CK SERPL-CCNC: 436 U/L — HIGH (ref 26–308)
CK SERPL-CCNC: 629 U/L — HIGH (ref 26–308)
CO2 SERPL-SCNC: 31 MMOL/L — SIGNIFICANT CHANGE UP (ref 22–31)
CREAT SERPL-MCNC: 1.5 MG/DL — HIGH (ref 0.5–1.3)
EGFR: 42 ML/MIN/1.73M2 — LOW
GLUCOSE SERPL-MCNC: 206 MG/DL — HIGH (ref 70–99)
HCT VFR BLD CALC: 33.9 % — LOW (ref 39–50)
HGB BLD-MCNC: 11.3 G/DL — LOW (ref 13–17)
INR BLD: 1.07 RATIO — SIGNIFICANT CHANGE UP (ref 0.85–1.18)
MAGNESIUM SERPL-MCNC: 1.9 MG/DL — SIGNIFICANT CHANGE UP (ref 1.6–2.6)
MCHC RBC-ENTMCNC: 31.5 PG — SIGNIFICANT CHANGE UP (ref 27–34)
MCHC RBC-ENTMCNC: 33.3 GM/DL — SIGNIFICANT CHANGE UP (ref 32–36)
MCV RBC AUTO: 94.4 FL — SIGNIFICANT CHANGE UP (ref 80–100)
NRBC # BLD: 0 /100 WBCS — SIGNIFICANT CHANGE UP (ref 0–0)
PHOSPHATE SERPL-MCNC: 3.2 MG/DL — SIGNIFICANT CHANGE UP (ref 2.5–4.5)
PLATELET # BLD AUTO: 238 K/UL — SIGNIFICANT CHANGE UP (ref 150–400)
POTASSIUM SERPL-MCNC: 4.4 MMOL/L — SIGNIFICANT CHANGE UP (ref 3.5–5.3)
POTASSIUM SERPL-SCNC: 4.4 MMOL/L — SIGNIFICANT CHANGE UP (ref 3.5–5.3)
PROTHROM AB SERPL-ACNC: 12.5 SEC — SIGNIFICANT CHANGE UP (ref 9.5–13)
RBC # BLD: 3.59 M/UL — LOW (ref 4.2–5.8)
RBC # FLD: 13.5 % — SIGNIFICANT CHANGE UP (ref 10.3–14.5)
SODIUM SERPL-SCNC: 141 MMOL/L — SIGNIFICANT CHANGE UP (ref 135–145)
TROPONIN I, HIGH SENSITIVITY RESULT: HIGH NG/L
TROPONIN I, HIGH SENSITIVITY RESULT: HIGH NG/L
WBC # BLD: 9 K/UL — SIGNIFICANT CHANGE UP (ref 3.8–10.5)
WBC # FLD AUTO: 9 K/UL — SIGNIFICANT CHANGE UP (ref 3.8–10.5)

## 2024-04-19 PROCEDURE — 99291 CRITICAL CARE FIRST HOUR: CPT

## 2024-04-19 PROCEDURE — 99233 SBSQ HOSP IP/OBS HIGH 50: CPT | Mod: GC

## 2024-04-19 PROCEDURE — 93010 ELECTROCARDIOGRAM REPORT: CPT

## 2024-04-19 PROCEDURE — 99233 SBSQ HOSP IP/OBS HIGH 50: CPT

## 2024-04-19 RX ORDER — METOPROLOL TARTRATE 50 MG
25 TABLET ORAL DAILY
Refills: 0 | Status: DISCONTINUED | OUTPATIENT
Start: 2024-04-19 | End: 2024-04-21

## 2024-04-19 RX ADMIN — HEPARIN SODIUM 950 UNIT(S)/HR: 5000 INJECTION INTRAVENOUS; SUBCUTANEOUS at 21:42

## 2024-04-19 RX ADMIN — ATORVASTATIN CALCIUM 80 MILLIGRAM(S): 80 TABLET, FILM COATED ORAL at 21:39

## 2024-04-19 RX ADMIN — Medication 25 MILLIGRAM(S): at 17:14

## 2024-04-19 RX ADMIN — Medication 81 MILLIGRAM(S): at 11:45

## 2024-04-19 RX ADMIN — Medication 6: at 17:14

## 2024-04-19 RX ADMIN — TICAGRELOR 90 MILLIGRAM(S): 90 TABLET ORAL at 17:14

## 2024-04-19 RX ADMIN — Medication 2: at 12:44

## 2024-04-19 RX ADMIN — SENNA PLUS 2 TABLET(S): 8.6 TABLET ORAL at 21:39

## 2024-04-19 RX ADMIN — HEPARIN SODIUM 950 UNIT(S)/HR: 5000 INJECTION INTRAVENOUS; SUBCUTANEOUS at 00:59

## 2024-04-19 RX ADMIN — TICAGRELOR 90 MILLIGRAM(S): 90 TABLET ORAL at 05:29

## 2024-04-19 RX ADMIN — HEPARIN SODIUM 950 UNIT(S)/HR: 5000 INJECTION INTRAVENOUS; SUBCUTANEOUS at 17:14

## 2024-04-19 RX ADMIN — HEPARIN SODIUM 950 UNIT(S)/HR: 5000 INJECTION INTRAVENOUS; SUBCUTANEOUS at 07:02

## 2024-04-19 RX ADMIN — Medication 4: at 08:23

## 2024-04-19 RX ADMIN — POLYETHYLENE GLYCOL 3350 17 GRAM(S): 17 POWDER, FOR SOLUTION ORAL at 11:47

## 2024-04-19 RX ADMIN — HEPARIN SODIUM 950 UNIT(S)/HR: 5000 INJECTION INTRAVENOUS; SUBCUTANEOUS at 19:12

## 2024-04-19 RX ADMIN — HEPARIN SODIUM 950 UNIT(S)/HR: 5000 INJECTION INTRAVENOUS; SUBCUTANEOUS at 00:57

## 2024-04-19 NOTE — PROGRESS NOTE ADULT - SUBJECTIVE AND OBJECTIVE BOX
Patient is a 89y old  Male who presents with a chief complaint of ACS (19 Apr 2024 09:56)    INTERVAL HPI/OVERNIGHT EVENTS: Patient was seen and examined. feeling better. on heparin gtt. tolerating diet. denies any chest pain. family present at bedside.     I&O's Summary    18 Apr 2024 07:01  -  19 Apr 2024 07:00  --------------------------------------------------------  IN: 902.5 mL / OUT: 1800 mL / NET: -897.5 mL    19 Apr 2024 07:01  -  19 Apr 2024 13:28  --------------------------------------------------------  IN: 19 mL / OUT: 200 mL / NET: -181 mL        LABS:                        11.3   9.00  )-----------( 238      ( 19 Apr 2024 06:41 )             33.9     04-19    141  |  105  |  26<H>  ----------------------------<  206<H>  4.4   |  31  |  1.50<H>    Ca    9.6      19 Apr 2024 06:41  Phos  3.2     04-19  Mg     1.9     04-19    TPro  6.9  /  Alb  3.4  /  TBili  0.5  /  DBili  x   /  AST  90<H>  /  ALT  29  /  AlkPhos  56  04-18    PT/INR - ( 17 Apr 2024 18:30 )   PT: 11.4 sec;   INR: 0.97 ratio         PTT - ( 19 Apr 2024 06:41 )  PTT:62.2 sec  Urinalysis Basic - ( 19 Apr 2024 06:41 )    Color: x / Appearance: x / SG: x / pH: x  Gluc: 206 mg/dL / Ketone: x  / Bili: x / Urobili: x   Blood: x / Protein: x / Nitrite: x   Leuk Esterase: x / RBC: x / WBC x   Sq Epi: x / Non Sq Epi: x / Bacteria: x      CAPILLARY BLOOD GLUCOSE      POCT Blood Glucose.: 178 mg/dL (19 Apr 2024 11:45)  POCT Blood Glucose.: 213 mg/dL (19 Apr 2024 07:37)  POCT Blood Glucose.: 180 mg/dL (18 Apr 2024 21:35)  POCT Blood Glucose.: 166 mg/dL (18 Apr 2024 17:13)        Urinalysis Basic - ( 19 Apr 2024 06:41 )    Color: x / Appearance: x / SG: x / pH: x  Gluc: 206 mg/dL / Ketone: x  / Bili: x / Urobili: x   Blood: x / Protein: x / Nitrite: x   Leuk Esterase: x / RBC: x / WBC x   Sq Epi: x / Non Sq Epi: x / Bacteria: x        MEDICATIONS  (STANDING):  aspirin enteric coated 81 milliGRAM(s) Oral daily  atorvastatin 80 milliGRAM(s) Oral at bedtime  dextrose 50% Injectable 25 Gram(s) IV Push once  dextrose 50% Injectable 12.5 Gram(s) IV Push once  dextrose 50% Injectable 25 Gram(s) IV Push once  dextrose Oral Gel 15 Gram(s) Oral once  glucagon  Injectable 1 milliGRAM(s) IntraMuscular once  heparin  Infusion.  Unit(s)/Hr (8 mL/Hr) IV Continuous <Continuous>  insulin lispro (ADMELOG) corrective regimen sliding scale   SubCutaneous Before meals and at bedtime  metoprolol succinate ER 25 milliGRAM(s) Oral daily  polyethylene glycol 3350 17 Gram(s) Oral daily  senna 2 Tablet(s) Oral at bedtime  ticagrelor 90 milliGRAM(s) Oral every 12 hours    MEDICATIONS  (PRN):  heparin   Injectable 4100 Unit(s) IV Push every 6 hours PRN For aPTT less than 40  HYDROmorphone  Injectable 0.5 milliGRAM(s) IV Push every 6 hours PRN Moderate Pain (4 - 6)      REVIEW OF SYSTEMS:  CONSTITUTIONAL: No fever or chills  HEENT:  No headache, no sore throat  RESPIRATORY: No cough, wheezing, or shortness of breath  CARDIOVASCULAR: No chest pain, palpitations  GASTROINTESTINAL: No abdominal pain, nausea, vomiting, or diarrhea  GENITOURINARY: No dysuria, frequency, or hematuria  NEUROLOGICAL: no focal weakness or dizziness  MUSCULOSKELETAL: no myalgias  PSYCH: no recent changes in mood    RADIOLOGY & ADDITIONAL TESTS:    Imaging Personally Reviewed:  [x] YES  [ ] NO    Consultant(s) Notes Reviewed:  [x] YES  [ ] NO    PHYSICAL EXAM:  T(C): 36.9 (04-19-24 @ 12:15), Max: 37 (04-19-24 @ 00:03)  HR: 75 (04-19-24 @ 11:00) (64 - 83)  BP: 92/49 (04-19-24 @ 10:00) (92/49 - 145/63)  RR: 27 (04-19-24 @ 11:00) (14 - 41)  SpO2: 99% (04-19-24 @ 11:00) (88% - 100%)    GENERAL: NAD, well-developed, well-groomed  HEENT:  anicteric, moist mucous membranes  CHEST/LUNG:  CTA b/l, no rales, wheezes, or rhonchi  HEART:  RRR, S1, S2  ABDOMEN:  BS+, soft, nontender, nondistended  EXTREMITIES: no edema  NERVOUS SYSTEM: answers questions and follows commands appropriately  PSYCH: normal affect    Care Discussed with Consultants/Other Providers [x] YES  [ ] NO

## 2024-04-19 NOTE — PROGRESS NOTE ADULT - SUBJECTIVE AND OBJECTIVE BOX
[facility] - Luc Church, [place], [place], [place] Frias  Office Number:  [phone]    Patient resting comfortably in bed in NAD.  Laying flat with no respiratory distress.  No complaints of chest pain, dyspnea, palpitations, PND, or orthopnea.  He denies any chest pain this morning  Trops continue to rise    ROS: negative unless otherwise mentioned.    Telemetry: SR    MEDICATIONS  (STANDING):  aspirin enteric coated 81 milliGRAM(s) Oral daily  atorvastatin 80 milliGRAM(s) Oral at bedtime  dextrose 50% Injectable 25 Gram(s) IV Push once  dextrose 50% Injectable 12.5 Gram(s) IV Push once  dextrose 50% Injectable 25 Gram(s) IV Push once  dextrose Oral Gel 15 Gram(s) Oral once  glucagon  Injectable 1 milliGRAM(s) IntraMuscular once  heparin  Infusion.  Unit(s)/Hr (8 mL/Hr) IV Continuous <Continuous>  insulin lispro (ADMELOG) corrective regimen sliding scale   SubCutaneous Before meals and at bedtime  polyethylene glycol 3350 17 Gram(s) Oral daily  senna 2 Tablet(s) Oral at bedtime  ticagrelor 90 milliGRAM(s) Oral every 12 hours    MEDICATIONS  (PRN):  heparin   Injectable 4100 Unit(s) IV Push every 6 hours PRN For aPTT less than 40  HYDROmorphone  Injectable 0.5 milliGRAM(s) IV Push every 6 hours PRN Moderate Pain (4 - 6)      Allergies    Cipro (Other)  simvastatin (Other)  lovastatin (Other)  sulfa drugs (Other)    Intolerances        Vital Signs Last 24 Hrs  T(C): 36.7 (19 Apr 2024 07:00), Max: 37 (18 Apr 2024 12:09)  T(F): 98.1 (19 Apr 2024 07:00), Max: 98.6 (18 Apr 2024 12:09)  HR: 83 (19 Apr 2024 09:00) (64 - 83)  BP: 103/84 (19 Apr 2024 09:00) (103/84 - 145/63)  BP(mean): 89 (19 Apr 2024 09:00) (78 - 98)  RR: 41 (19 Apr 2024 09:00) (14 - 41)  SpO2: 88% (19 Apr 2024 09:00) (88% - 100%)    Parameters below as of 19 Apr 2024 07:00  Patient On (Oxygen Delivery Method): room air        I&O's Summary    18 Apr 2024 07:01  -  19 Apr 2024 07:00  --------------------------------------------------------  IN: 902.5 mL / OUT: 1800 mL / NET: -897.5 mL    19 Apr 2024 07:01  -  19 Apr 2024 09:57  --------------------------------------------------------  IN: 19 mL / OUT: 200 mL / NET: -181 mL        ON EXAM:    General: NAD, awake and alert, oriented x 3  HEENT: Mucous membranes are moist, anicteric  Lungs: Non-labored, breath sounds are clear bilaterally, No wheezing, rales or rhonchi  Cardiovascular: Regular, S1 and S2, no murmurs, rubs, or gallops  Gastrointestinal: Bowel Sounds present, soft, nontender.   Lymph: trace peripheral edema. No lymphadenopathy.  Skin: No rashes or ulcers  Psych:  Mood & affect appropriate    LABS: All Labs Reviewed:                        11.3   9.00  )-----------( 238      ( 19 Apr 2024 06:41 )             33.9                         10.9   9.50  )-----------( 239      ( 18 Apr 2024 17:40 )             32.0                         11.1   8.45  )-----------( 235      ( 18 Apr 2024 05:26 )             33.1     19 Apr 2024 06:41    141    |  105    |  26     ----------------------------<  206    4.4     |  31     |  1.50   18 Apr 2024 05:26    142    |  106    |  31     ----------------------------<  166    4.0     |  31     |  1.60   17 Apr 2024 18:30    144    |  108    |  32     ----------------------------<  179    4.8     |  28     |  1.70     Ca    9.6        19 Apr 2024 06:41  Ca    9.6        18 Apr 2024 05:26  Ca    9.8        17 Apr 2024 18:30  Phos  3.2       19 Apr 2024 06:41  Phos  2.9       18 Apr 2024 05:26  Mg     1.9       19 Apr 2024 06:41  Mg     2.0       18 Apr 2024 05:26  Mg     2.2       17 Apr 2024 18:30    TPro  6.9    /  Alb  3.4    /  TBili  0.5    /  DBili  x      /  AST  90     /  ALT  29     /  AlkPhos  56     18 Apr 2024 05:26  TPro  8.0    /  Alb  3.8    /  TBili  0.5    /  DBili  x      /  AST  24     /  ALT  27     /  AlkPhos  63     17 Apr 2024 18:30    PT/INR - ( 17 Apr 2024 18:30 )   PT: 11.4 sec;   INR: 0.97 ratio         PTT - ( 19 Apr 2024 06:41 )  PTT:62.2 sec  CARDIAC MARKERS ( 18 Apr 2024 17:40 )  x     / x     / 919 U/L / x     / 85.5 ng/mL  CARDIAC MARKERS ( 18 Apr 2024 12:45 )  x     / x     / 888 U/L / x     / 96.9 ng/mL  CARDIAC MARKERS ( 18 Apr 2024 09:00 )  x     / x     / 747 U/L / x     / 88.2 ng/mL      Blood Culture:

## 2024-04-19 NOTE — PROGRESS NOTE ADULT - ASSESSMENT
89M PMHx HTN, HLD, T2DM, Bladder CA, MVR presents to ER with chest pain radiating down bilateral arms, admitted for STEMI.    Plan:    NEURO:   - No acute needs    CV:   - STEMI, EKG on admission NSR with LAD, NEW LBBB and J point inferior lead  - No plan for cath, repeat EKGs reviewed and seems that pt re-perfused  - Trend cardiac enzymes to peak, f/u repeat   - f/u TTE, duplex US negative  - Dilaudid .5mg IV q4h PRN for chest pain  - C/w ASA, brillinta and high intensity statin   - Holding home ACE, BPs stable    PULM:   - No acute needs    GI:   - DASH/TLC     RENAL:   - Elevated Cr at 1.7 (baseline Cr ~1.5), now at baseline  - Hold home ACE  - Continue to monitor       ENDO:   - Hold home oral medications  - LDISS  - Goal -180 in ICU    ID:   - No acute needs    HEME:   - Hg low-normal, appears near baseline  - DVT ppx: Heparin Infusion.    GOC: Full Code    #Dispo: Stable to transfer to telemetry 89M PMHx HTN, HLD, T2DM, Bladder CA, MVR presents to ER with chest pain radiating down bilateral arms, admitted for STEMI.    Plan:    NEURO:   - No acute needs    CV:   - STEMI, EKG on admission NSR with LAD, NEW LBBB and J point inferior lead  - No plan for cath, repeat EKGs reviewed and seems that pt re-perfused  - Trend cardiac enzymes to peak, f/u repeat   - TTE: LVEF appears grossly normal. Cannot rule out LV segmental abnormalities given limited images.    < end of copied text >    - Dilaudid .5mg IV q4h PRN for chest pain  - C/w ASA, brillinta and high intensity statin   - Holding home ACE, BPs stable    PULM:   - No acute needs    GI:   - DASH/TLC     RENAL:   - Elevated Cr at 1.7 (baseline Cr ~1.5), now at baseline  - Hold home ACE  - Continue to monitor       ENDO:   - Hold home oral medications  - LDISS  - Goal -180 in ICU    ID:   - No acute needs    HEME:   - Hg low-normal, appears near baseline  - DVT ppx: Heparin Infusion.    GOC: Full Code    #Dispo: Stable to transfer to telemetry 89M PMHx HTN, HLD, T2DM, Bladder CA, MVR presents to ER with chest pain radiating down bilateral arms, admitted for STEMI.    Plan:    NEURO:   - No acute needs    CV:   - STEMI, EKG on admission NSR with LAD, NEW LBBB and J point inferior lead  - No plan for cath, repeat EKGs reviewed and seems that pt re-perfused  - Trend cardiac enzymes to peak, f/u repeat   - TTE: LVEF appears grossly normal. Cannot rule out LV segmental abnormalities given limited images.    < end of copied text >    - Dilaudid .5mg IV q4h PRN for chest pain  - C/w ASA, brillinta and high intensity statin   - Holding home ACE, BPs stable    PULM:   - No acute needs    GI:   - DASH/TLC     RENAL:   - Elevated Cr at 1.7 (baseline Cr ~1.5), now at baseline  - Hold home ACE  - Continue to monitor       ENDO:   - Hold home oral medications  - LDISS  - Goal -180 in ICU    ID:   - No acute needs    HEME:   - Hg low-normal, appears near baseline  - DVT ppx: Heparin Infusion.    GOC: Full Code    #Dispo: Stable to transfer to telemetry    Family updated at bedside by Dr. Turner 89M PMHx HTN, HLD, T2DM, Bladder CA, MVR presents to ER with chest pain radiating down bilateral arms, admitted for STEMI.    Plan:    NEURO:   - No acute needs    CV:   - STEMI, EKG on admission NSR with LAD, NEW LBBB and J point inferior lead  - No plan for cath, repeat EKGs reviewed and seems that pt re-perfused  - Toprol 25mg qd added per cardio recs  - Trend cardiac enzymes to peak, f/u repeat   - TTE: LVEF appears grossly normal. Cannot rule out LV segmental abnormalities given limited images.  - Dilaudid .5mg IV q4h PRN for chest pain  - C/w ASA, brillinta and high intensity statin   - Holding home ACE, BPs stable    PULM:   - No acute needs    GI:   - DASH/TLC     RENAL:   - Elevated Cr at 1.7 (baseline Cr ~1.5), now at baseline  - Hold home ACE  - Continue to monitor       ENDO:   - Hold home oral medications  - LDISS  - Goal -180 in ICU    ID:   - No acute needs    HEME:   - Hg low-normal, appears near baseline  - DVT ppx: Heparin Infusion.    GOC: Full Code    #Dispo: Stable to transfer to telemetry    Family updated at bedside by Dr. Turner

## 2024-04-19 NOTE — PROGRESS NOTE ADULT - ATTENDING COMMENTS
89 year old male with history of HTN, HLD, DM2, MVR, and bladder cancer admitted with STEMI. Cardiology following. Chest pain improved. Manage conservatively for now.    Pain control - hydromorphine 0.5 mg Q6H PRN  ASA, Brilinta, Atorvastatin  Heparin gtt  Toprol 25 mg daily added  Cardiology follow up

## 2024-04-19 NOTE — PROGRESS NOTE ADULT - ASSESSMENT
89M PMHx HTN, HLD, T2DM, Bladder CA, MVR presents to ER with chest pain radiating down bilateral arms, admitted for STEMI    ACS    - on heparin gtt, asa and brilinta     - high dose lipitor     - cardio following. plan for medical management for now.      - ECHO normal LVEF. s/p TAVR/ s/p MVR     - toprol xl 25mg daily added     YANETH    - home ACEi held     hematuria  h/o bladder cancer    - monitor hgb     - follow up outpatient with urologist     DVT proph; heparin infusion.     plan for transfer out to telemetry

## 2024-04-19 NOTE — PROGRESS NOTE ADULT - CRITICAL CARE ATTENDING COMMENT
Upon my evaluation, this patient is at high risk for imminent or life threatening deterioration due to NSTEMI and other active medical issues which require my direct attention, intervention, and personal management.  I have personally spent >30 minutes  of critical care time exclusive of time spent on separate billing procedures. This includes review of laboratory data, radiology results, discussion with primary team\patient, and monitoring for potential decompensation. Interventions were performed as documented above.

## 2024-04-19 NOTE — PROGRESS NOTE ADULT - ASSESSMENT
89yo M with pmhx HTN, CAD, HLD, DM2, mitral valve replacement, aortic stenosis s/p TAVR (2017), bladder cancer s/p TURBT (2023), , ulcerative colitis presenting with chest pain and ST changes on EKG     - initial EKG with SR LBBB with discordant LAINE >2mm in inferior leads. After initiation of therapy in ED, LAINE seem to have improved and pts symptoms better.   - likely pt presented with MI which is supported by cardiac biomarker trend.   - Troponin continue to rise, please trend until peak/plateau  - Dr. Connors spoke to pt regarding coronary angiography and risk. Given his age, resolution of EKG changes, CKD, will medically manage for now. If sx change will move forward with urgent coronary cath. Case discussed with Dr. Shipman who is in agreement.   - He remains symptom free this morning  - trend full set of Nadia  - cont DAPT.   - hep gtt for 48 hours total  - TTE with poor visualization but with grossly normal LV function  - Lipitor 80mg HS  - Would add toprol 25mg Qday  - Fentanyl PRN for pain  - if pain cont can try nitro sl. may need long acting nitrates in future.   - Appears compensated from HF POV. hold diuretics.     - monitor for hematuria given recent bladder procedure. would fu with outpt   - Monitor and replete electrolytes. Keep K>4.0 and Mg>2.0.   - Further cardiac workup will depend on clinical course.

## 2024-04-19 NOTE — PROGRESS NOTE ADULT - SUBJECTIVE AND OBJECTIVE BOX
CHARTING IN PROGRESS      Interval Events:     Review of Systems:  Constitutional: No fever  Neuro: +weakness. No headache  Resp: No cough, shortness of breath  CVS: No chest pain  GI: No abdominal pain, n/v    ICU Vital Signs Last 24 Hrs  T(C): 36.7 (19 Apr 2024 07:00), Max: 37 (18 Apr 2024 12:09)  T(F): 98.1 (19 Apr 2024 07:00), Max: 98.6 (18 Apr 2024 12:09)  HR: 83 (19 Apr 2024 09:00) (64 - 83)  BP: 103/84 (19 Apr 2024 09:00) (103/84 - 145/63)  BP(mean): 89 (19 Apr 2024 09:00) (78 - 98)  ABP: --  ABP(mean): --  RR: 41 (19 Apr 2024 09:00) (14 - 41)  SpO2: 88% (19 Apr 2024 09:00) (88% - 100%)    O2 Parameters below as of 19 Apr 2024 07:00  Patient On (Oxygen Delivery Method): room air              04-18-24 @ 07:01  -  04-19-24 @ 07:00  --------------------------------------------------------  IN: 902.5 mL / OUT: 1800 mL / NET: -897.5 mL    04-19-24 @ 07:01  -  04-19-24 @ 09:42  --------------------------------------------------------  IN: 19 mL / OUT: 200 mL / NET: -181 mL        CAPILLARY BLOOD GLUCOSE      POCT Blood Glucose.: 213 mg/dL (19 Apr 2024 07:37)      I&O's Summary    18 Apr 2024 07:01  -  19 Apr 2024 07:00  --------------------------------------------------------  IN: 902.5 mL / OUT: 1800 mL / NET: -897.5 mL    19 Apr 2024 07:01  -  19 Apr 2024 09:42  --------------------------------------------------------  IN: 19 mL / OUT: 200 mL / NET: -181 mL        Physical Exam:   Gen: Awake, NAD. Appears younger than stated age. Laying in bed with HOB elevated. US techs at bedside  Neuro: A&Ox3. No focal defecits. Answers questions and follows commands.  HEENT: +erythematous lesion on superior aspect of skull. PERRLA.  Resp: CTA B/L. No wheezes, crackles or rhonchi  CVS: +s1/s2. RRR. No murmurs  Abd: Soft. NT/ND. +BS  Ext: Trace edema B/L       Meds:      atorvastatin Oral  dextrose 50% Injectable IV Push  dextrose 50% Injectable IV Push  dextrose 50% Injectable IV Push  dextrose Oral Gel Oral  glucagon  Injectable IntraMuscular  insulin lispro (ADMELOG) corrective regimen sliding scale SubCutaneous      HYDROmorphone  Injectable IV Push PRN      aspirin enteric coated Oral  heparin   Injectable IV Push PRN  heparin  Infusion. IV Continuous  ticagrelor Oral    polyethylene glycol 3350 Oral  senna Oral                                      11.3   9.00  )-----------( 238      ( 19 Apr 2024 06:41 )             33.9       04-19    141  |  105  |  26<H>  ----------------------------<  206<H>  4.4   |  31  |  1.50<H>    Ca    9.6      19 Apr 2024 06:41  Phos  3.2     04-19  Mg     1.9     04-19    TPro  6.9  /  Alb  3.4  /  TBili  0.5  /  DBili  x   /  AST  90<H>  /  ALT  29  /  AlkPhos  56  04-18      CARDIAC MARKERS ( 18 Apr 2024 17:40 )  x     / x     / 919 U/L / x     / 85.5 ng/mL  CARDIAC MARKERS ( 18 Apr 2024 12:45 )  x     / x     / 888 U/L / x     / 96.9 ng/mL  CARDIAC MARKERS ( 18 Apr 2024 09:00 )  x     / x     / 747 U/L / x     / 88.2 ng/mL      PT/INR - ( 17 Apr 2024 18:30 )   PT: 11.4 sec;   INR: 0.97 ratio         PTT - ( 19 Apr 2024 06:41 )  PTT:62.2 sec  Urinalysis Basic - ( 19 Apr 2024 06:41 )    Color: x / Appearance: x / SG: x / pH: x  Gluc: 206 mg/dL / Ketone: x  / Bili: x / Urobili: x   Blood: x / Protein: x / Nitrite: x   Leuk Esterase: x / RBC: x / WBC x   Sq Epi: x / Non Sq Epi: x / Bacteria: x                  Radiology:    Bedside Ultrasound:    Tubes/Lines:      GLOBAL ISSUE/BEST PRACTICE:  Analgesia:  Sedation:  HOB elevation: Y  Stress ulcer prophylaxis:  VTE prophylaxis:  Glycemic control:  Nutrition:    CODE STATUS:        Interval Events: Pt seen and examined at bedside. Denies any chest pain at present. No acute overnight events    Review of Systems:  Constitutional: No fever  Neuro: +weakness. No headache  Resp: No cough, shortness of breath  CVS: No chest pain  GI: No abdominal pain, n/v    ICU Vital Signs Last 24 Hrs  T(C): 36.7 (19 Apr 2024 07:00), Max: 37 (18 Apr 2024 12:09)  T(F): 98.1 (19 Apr 2024 07:00), Max: 98.6 (18 Apr 2024 12:09)  HR: 83 (19 Apr 2024 09:00) (64 - 83)  BP: 103/84 (19 Apr 2024 09:00) (103/84 - 145/63)  BP(mean): 89 (19 Apr 2024 09:00) (78 - 98)  ABP: --  ABP(mean): --  RR: 41 (19 Apr 2024 09:00) (14 - 41)  SpO2: 88% (19 Apr 2024 09:00) (88% - 100%)    O2 Parameters below as of 19 Apr 2024 07:00  Patient On (Oxygen Delivery Method): room air              04-18-24 @ 07:01  -  04-19-24 @ 07:00  --------------------------------------------------------  IN: 902.5 mL / OUT: 1800 mL / NET: -897.5 mL    04-19-24 @ 07:01  -  04-19-24 @ 09:42  --------------------------------------------------------  IN: 19 mL / OUT: 200 mL / NET: -181 mL        CAPILLARY BLOOD GLUCOSE      POCT Blood Glucose.: 213 mg/dL (19 Apr 2024 07:37)      I&O's Summary    18 Apr 2024 07:01  -  19 Apr 2024 07:00  --------------------------------------------------------  IN: 902.5 mL / OUT: 1800 mL / NET: -897.5 mL    19 Apr 2024 07:01  -  19 Apr 2024 09:42  --------------------------------------------------------  IN: 19 mL / OUT: 200 mL / NET: -181 mL        Physical Exam:   Gen: Awake, NAD.   Neuro: A&Ox3. Appears at baseline. Answers questions and follows commands.  HEENT: +erythematous lesion on superior aspect of skull. PERRLA.  Resp: CTA B/L. No wheezes, crackles or rhonchi  CVS: +s1/s2. RRR. No murmurs  Abd: Soft. NT/ND. +BS  Ext: B/L Trace edema       Meds:      atorvastatin Oral  dextrose 50% Injectable IV Push  dextrose 50% Injectable IV Push  dextrose 50% Injectable IV Push  dextrose Oral Gel Oral  glucagon  Injectable IntraMuscular  insulin lispro (ADMELOG) corrective regimen sliding scale SubCutaneous      HYDROmorphone  Injectable IV Push PRN      aspirin enteric coated Oral  heparin   Injectable IV Push PRN  heparin  Infusion. IV Continuous  ticagrelor Oral    polyethylene glycol 3350 Oral  senna Oral                                      11.3   9.00  )-----------( 238      ( 19 Apr 2024 06:41 )             33.9       04-19    141  |  105  |  26<H>  ----------------------------<  206<H>  4.4   |  31  |  1.50<H>    Ca    9.6      19 Apr 2024 06:41  Phos  3.2     04-19  Mg     1.9     04-19    TPro  6.9  /  Alb  3.4  /  TBili  0.5  /  DBili  x   /  AST  90<H>  /  ALT  29  /  AlkPhos  56  04-18      CARDIAC MARKERS ( 18 Apr 2024 17:40 )  x     / x     / 919 U/L / x     / 85.5 ng/mL  CARDIAC MARKERS ( 18 Apr 2024 12:45 )  x     / x     / 888 U/L / x     / 96.9 ng/mL  CARDIAC MARKERS ( 18 Apr 2024 09:00 )  x     / x     / 747 U/L / x     / 88.2 ng/mL      PT/INR - ( 17 Apr 2024 18:30 )   PT: 11.4 sec;   INR: 0.97 ratio         PTT - ( 19 Apr 2024 06:41 )  PTT:62.2 sec  Urinalysis Basic - ( 19 Apr 2024 06:41 )    Color: x / Appearance: x / SG: x / pH: x  Gluc: 206 mg/dL / Ketone: x  / Bili: x / Urobili: x   Blood: x / Protein: x / Nitrite: x   Leuk Esterase: x / RBC: x / WBC x   Sq Epi: x / Non Sq Epi: x / Bacteria: x                  Radiology:    Bedside Ultrasound:    Tubes/Lines:      GLOBAL ISSUE/BEST PRACTICE:  Analgesia:  Sedation:  HOB elevation: Y  Stress ulcer prophylaxis:  VTE prophylaxis:  Glycemic control:  Nutrition:    CODE STATUS:        Interval Events: Pt seen and examined at bedside. Denies any chest pain at present. No acute overnight events    Review of Systems:  Constitutional: No fever  Neuro: +weakness. No headache  Resp: No cough, shortness of breath  CVS: No chest pain  GI: No abdominal pain, n/v    ICU Vital Signs Last 24 Hrs  T(C): 36.7 (19 Apr 2024 07:00), Max: 37 (18 Apr 2024 12:09)  T(F): 98.1 (19 Apr 2024 07:00), Max: 98.6 (18 Apr 2024 12:09)  HR: 83 (19 Apr 2024 09:00) (64 - 83)  BP: 103/84 (19 Apr 2024 09:00) (103/84 - 145/63)  BP(mean): 89 (19 Apr 2024 09:00) (78 - 98)  ABP: --  ABP(mean): --  RR: 41 (19 Apr 2024 09:00) (14 - 41)  SpO2: 88% (19 Apr 2024 09:00) (88% - 100%)    O2 Parameters below as of 19 Apr 2024 07:00  Patient On (Oxygen Delivery Method): room air              04-18-24 @ 07:01  -  04-19-24 @ 07:00  --------------------------------------------------------  IN: 902.5 mL / OUT: 1800 mL / NET: -897.5 mL    04-19-24 @ 07:01  -  04-19-24 @ 09:42  --------------------------------------------------------  IN: 19 mL / OUT: 200 mL / NET: -181 mL        CAPILLARY BLOOD GLUCOSE      POCT Blood Glucose.: 213 mg/dL (19 Apr 2024 07:37)      I&O's Summary    18 Apr 2024 07:01  -  19 Apr 2024 07:00  --------------------------------------------------------  IN: 902.5 mL / OUT: 1800 mL / NET: -897.5 mL    19 Apr 2024 07:01  -  19 Apr 2024 09:42  --------------------------------------------------------  IN: 19 mL / OUT: 200 mL / NET: -181 mL        Physical Exam:   Gen: Awake, NAD.   Neuro: A&Ox3. Appears at baseline. Answers questions and follows commands.  HEENT: +erythematous lesion on superior aspect of skull. PERRLA.  Resp: CTA B/L. No wheezes, crackles or rhonchi  CVS: +s1/s2. RRR. No murmurs  Abd: Soft. NT/ND. +BS  Ext: B/L Trace edema       Meds:      atorvastatin Oral  dextrose 50% Injectable IV Push  dextrose 50% Injectable IV Push  dextrose 50% Injectable IV Push  dextrose Oral Gel Oral  glucagon  Injectable IntraMuscular  insulin lispro (ADMELOG) corrective regimen sliding scale SubCutaneous      HYDROmorphone  Injectable IV Push PRN      aspirin enteric coated Oral  heparin   Injectable IV Push PRN  heparin  Infusion. IV Continuous  ticagrelor Oral    polyethylene glycol 3350 Oral  senna Oral                                      11.3   9.00  )-----------( 238      ( 19 Apr 2024 06:41 )             33.9       04-19    141  |  105  |  26<H>  ----------------------------<  206<H>  4.4   |  31  |  1.50<H>    Ca    9.6      19 Apr 2024 06:41  Phos  3.2     04-19  Mg     1.9     04-19    TPro  6.9  /  Alb  3.4  /  TBili  0.5  /  DBili  x   /  AST  90<H>  /  ALT  29  /  AlkPhos  56  04-18      CARDIAC MARKERS ( 18 Apr 2024 17:40 )  x     / x     / 919 U/L / x     / 85.5 ng/mL  CARDIAC MARKERS ( 18 Apr 2024 12:45 )  x     / x     / 888 U/L / x     / 96.9 ng/mL  CARDIAC MARKERS ( 18 Apr 2024 09:00 )  x     / x     / 747 U/L / x     / 88.2 ng/mL      PT/INR - ( 17 Apr 2024 18:30 )   PT: 11.4 sec;   INR: 0.97 ratio         PTT - ( 19 Apr 2024 06:41 )  PTT:62.2 sec  Urinalysis Basic - ( 19 Apr 2024 06:41 )    Color: x / Appearance: x / SG: x / pH: x  Gluc: 206 mg/dL / Ketone: x  / Bili: x / Urobili: x   Blood: x / Protein: x / Nitrite: x   Leuk Esterase: x / RBC: x / WBC x   Sq Epi: x / Non Sq Epi: x / Bacteria: x      Radiology:  US Duplex Venous Lower Ext Complete, Bilateral (04.18.24 @ 09:06):  IMPRESSION:  No evidence of deep venous thrombosis in either lower extremity.    Tubes/Lines: + peripheral IV      GLOBAL ISSUE/BEST PRACTICE:  Analgesia: Y   Sedation: N  HOB elevation: Y  Stress ulcer prophylaxis: N  VTE prophylaxis: Y   Glycemic control: Y  Nutrition: Y    CODE STATUS: Full Code

## 2024-04-20 LAB
ALBUMIN SERPL ELPH-MCNC: 3.1 G/DL — LOW (ref 3.3–5)
ALP SERPL-CCNC: 50 U/L — SIGNIFICANT CHANGE UP (ref 40–120)
ALT FLD-CCNC: 30 U/L — SIGNIFICANT CHANGE UP (ref 12–78)
ANION GAP SERPL CALC-SCNC: 8 MMOL/L — SIGNIFICANT CHANGE UP (ref 5–17)
APTT BLD: 55.9 SEC — HIGH (ref 24.5–35.6)
AST SERPL-CCNC: 95 U/L — HIGH (ref 15–37)
BILIRUB SERPL-MCNC: 0.8 MG/DL — SIGNIFICANT CHANGE UP (ref 0.2–1.2)
BUN SERPL-MCNC: 27 MG/DL — HIGH (ref 7–23)
CALCIUM SERPL-MCNC: 9.3 MG/DL — SIGNIFICANT CHANGE UP (ref 8.5–10.1)
CHLORIDE SERPL-SCNC: 106 MMOL/L — SIGNIFICANT CHANGE UP (ref 96–108)
CO2 SERPL-SCNC: 29 MMOL/L — SIGNIFICANT CHANGE UP (ref 22–31)
CREAT SERPL-MCNC: 1.5 MG/DL — HIGH (ref 0.5–1.3)
EGFR: 42 ML/MIN/1.73M2 — LOW
GLUCOSE SERPL-MCNC: 145 MG/DL — HIGH (ref 70–99)
HCT VFR BLD CALC: 33.4 % — LOW (ref 39–50)
HGB BLD-MCNC: 11 G/DL — LOW (ref 13–17)
INR BLD: 1.05 RATIO — SIGNIFICANT CHANGE UP (ref 0.85–1.18)
MAGNESIUM SERPL-MCNC: 2 MG/DL — SIGNIFICANT CHANGE UP (ref 1.6–2.6)
MCHC RBC-ENTMCNC: 31.4 PG — SIGNIFICANT CHANGE UP (ref 27–34)
MCHC RBC-ENTMCNC: 32.9 GM/DL — SIGNIFICANT CHANGE UP (ref 32–36)
MCV RBC AUTO: 95.4 FL — SIGNIFICANT CHANGE UP (ref 80–100)
NRBC # BLD: 0 /100 WBCS — SIGNIFICANT CHANGE UP (ref 0–0)
PHOSPHATE SERPL-MCNC: 3 MG/DL — SIGNIFICANT CHANGE UP (ref 2.5–4.5)
PLATELET # BLD AUTO: 233 K/UL — SIGNIFICANT CHANGE UP (ref 150–400)
POTASSIUM SERPL-MCNC: 3.7 MMOL/L — SIGNIFICANT CHANGE UP (ref 3.5–5.3)
POTASSIUM SERPL-SCNC: 3.7 MMOL/L — SIGNIFICANT CHANGE UP (ref 3.5–5.3)
PROT SERPL-MCNC: 7 G/DL — SIGNIFICANT CHANGE UP (ref 6–8.3)
PROTHROM AB SERPL-ACNC: 12.3 SEC — SIGNIFICANT CHANGE UP (ref 9.5–13)
RBC # BLD: 3.5 M/UL — LOW (ref 4.2–5.8)
RBC # FLD: 13.5 % — SIGNIFICANT CHANGE UP (ref 10.3–14.5)
SODIUM SERPL-SCNC: 143 MMOL/L — SIGNIFICANT CHANGE UP (ref 135–145)
TROPONIN I, HIGH SENSITIVITY RESULT: HIGH NG/L
WBC # BLD: 8.88 K/UL — SIGNIFICANT CHANGE UP (ref 3.8–10.5)
WBC # FLD AUTO: 8.88 K/UL — SIGNIFICANT CHANGE UP (ref 3.8–10.5)

## 2024-04-20 PROCEDURE — 99233 SBSQ HOSP IP/OBS HIGH 50: CPT

## 2024-04-20 RX ORDER — CLOPIDOGREL BISULFATE 75 MG/1
300 TABLET, FILM COATED ORAL ONCE
Refills: 0 | Status: COMPLETED | OUTPATIENT
Start: 2024-04-20 | End: 2024-04-20

## 2024-04-20 RX ORDER — CLOPIDOGREL BISULFATE 75 MG/1
75 TABLET, FILM COATED ORAL DAILY
Refills: 0 | Status: DISCONTINUED | OUTPATIENT
Start: 2024-04-21 | End: 2024-04-21

## 2024-04-20 RX ADMIN — Medication 6: at 12:24

## 2024-04-20 RX ADMIN — CLOPIDOGREL BISULFATE 300 MILLIGRAM(S): 75 TABLET, FILM COATED ORAL at 10:30

## 2024-04-20 RX ADMIN — Medication 81 MILLIGRAM(S): at 11:28

## 2024-04-20 RX ADMIN — HEPARIN SODIUM 950 UNIT(S)/HR: 5000 INJECTION INTRAVENOUS; SUBCUTANEOUS at 05:57

## 2024-04-20 RX ADMIN — Medication 4: at 22:13

## 2024-04-20 RX ADMIN — Medication 25 MILLIGRAM(S): at 05:40

## 2024-04-20 RX ADMIN — HEPARIN SODIUM 950 UNIT(S)/HR: 5000 INJECTION INTRAVENOUS; SUBCUTANEOUS at 07:06

## 2024-04-20 RX ADMIN — POLYETHYLENE GLYCOL 3350 17 GRAM(S): 17 POWDER, FOR SOLUTION ORAL at 11:28

## 2024-04-20 RX ADMIN — SENNA PLUS 2 TABLET(S): 8.6 TABLET ORAL at 20:11

## 2024-04-20 RX ADMIN — ATORVASTATIN CALCIUM 80 MILLIGRAM(S): 80 TABLET, FILM COATED ORAL at 20:11

## 2024-04-20 RX ADMIN — TICAGRELOR 90 MILLIGRAM(S): 90 TABLET ORAL at 05:40

## 2024-04-20 NOTE — PROGRESS NOTE ADULT - ASSESSMENT
89M PMHx HTN, HLD, T2DM, Bladder CA, MVR presents to ER with chest pain radiating down bilateral arms, admitted for STEMI    ACS    - off heparin gtt, cont asa. brilinta changed to plavix      - high dose lipitor     - cardio following. plan for medical management for now.      - ECHO normal LVEF. s/p TAVR/ s/p MVR     - toprol xl 25mg daily    YANETH    - home ACEi held     hematuria  h/o bladder cancer    - monitor hgb     - follow up outpatient with urologist     DVT proph; heparin gtt held.   discussed with wife over the phone. anticipate discharge tomorrow.

## 2024-04-20 NOTE — PROGRESS NOTE ADULT - ASSESSMENT
89yo M with pmhx HTN, CAD, HLD, DM2, mitral valve replacement, aortic stenosis s/p TAVR (2017), bladder cancer s/p TURBT (2023), , ulcerative colitis presenting with chest pain and ST changes on EKG     NSTEMI vs STEMI  - Initial EKG with SR LBBB with discordant LAINE >2mm in inferior leads.   - likely pt presented with MI which is supported by cardiac biomarker trend.   - Troponin peaked and downtrended.  No need to monitor  - Spoke to pt regarding coronary angiography and risk. Given his age, resolution of EKG changes, CKD, will medically manage for now.  If sx change will move forward with urgent coronary cath. Case discussed with Dr. Shipman who is in agreement.   - He remains symptom free   - Cont DAPT but would switch Brilinta to Plavix.  Will reload with Plavix 300 mg x 1 today and start 75 mg daily in am, 4/21  - D/C Heparin as he has completed 48 hr yessy  - Continue BB, uptitrate as tolerated  - Continue statin  - TTE with poor visualization but with grossly normal LV function    - Appears compensated from HF POV.  Hold diuretics.   - Monitor and replete electrolytes. Keep K>4.0 and Mg>2.0.   - Will continue to follow    Katharine Orlando DNP, NP-C, AGACNP-C  Cardiology   Call TEAMS

## 2024-04-20 NOTE — PROGRESS NOTE ADULT - SUBJECTIVE AND OBJECTIVE BOX
Patient is a 89y old  Male who presents with a chief complaint of ACS (20 Apr 2024 06:36)    INTERVAL HPI/OVERNIGHT EVENTS: Patient was seen and examined. denies any chest pain. tolerating diet.     I&O's Summary    19 Apr 2024 07:01  -  20 Apr 2024 07:00  --------------------------------------------------------  IN: 104.5 mL / OUT: 650 mL / NET: -545.5 mL        LABS:                        11.0   8.88  )-----------( 233      ( 20 Apr 2024 04:41 )             33.4     04-20    143  |  106  |  27<H>  ----------------------------<  145<H>  3.7   |  29  |  1.50<H>    Ca    9.3      20 Apr 2024 04:41  Phos  3.0     04-20  Mg     2.0     04-20    TPro  7.0  /  Alb  3.1<L>  /  TBili  0.8  /  DBili  x   /  AST  95<H>  /  ALT  30  /  AlkPhos  50  04-20    PT/INR - ( 20 Apr 2024 04:41 )   PT: 12.3 sec;   INR: 1.05 ratio         PTT - ( 20 Apr 2024 04:41 )  PTT:55.9 sec  Urinalysis Basic - ( 20 Apr 2024 04:41 )    Color: x / Appearance: x / SG: x / pH: x  Gluc: 145 mg/dL / Ketone: x  / Bili: x / Urobili: x   Blood: x / Protein: x / Nitrite: x   Leuk Esterase: x / RBC: x / WBC x   Sq Epi: x / Non Sq Epi: x / Bacteria: x      CAPILLARY BLOOD GLUCOSE      POCT Blood Glucose.: 261 mg/dL (20 Apr 2024 12:00)  POCT Blood Glucose.: 137 mg/dL (20 Apr 2024 08:09)  POCT Blood Glucose.: 123 mg/dL (19 Apr 2024 21:37)  POCT Blood Glucose.: 270 mg/dL (19 Apr 2024 17:12)        Urinalysis Basic - ( 20 Apr 2024 04:41 )    Color: x / Appearance: x / SG: x / pH: x  Gluc: 145 mg/dL / Ketone: x  / Bili: x / Urobili: x   Blood: x / Protein: x / Nitrite: x   Leuk Esterase: x / RBC: x / WBC x   Sq Epi: x / Non Sq Epi: x / Bacteria: x        MEDICATIONS  (STANDING):  aspirin enteric coated 81 milliGRAM(s) Oral daily  atorvastatin 80 milliGRAM(s) Oral at bedtime  dextrose 50% Injectable 25 Gram(s) IV Push once  dextrose 50% Injectable 12.5 Gram(s) IV Push once  dextrose 50% Injectable 25 Gram(s) IV Push once  dextrose Oral Gel 15 Gram(s) Oral once  glucagon  Injectable 1 milliGRAM(s) IntraMuscular once  insulin lispro (ADMELOG) corrective regimen sliding scale   SubCutaneous Before meals and at bedtime  metoprolol succinate ER 25 milliGRAM(s) Oral daily  polyethylene glycol 3350 17 Gram(s) Oral daily  senna 2 Tablet(s) Oral at bedtime    MEDICATIONS  (PRN):  HYDROmorphone  Injectable 0.5 milliGRAM(s) IV Push every 6 hours PRN Moderate Pain (4 - 6)      REVIEW OF SYSTEMS:  CONSTITUTIONAL: No fever or chills  HEENT:  No headache, no sore throat  RESPIRATORY: No cough, wheezing, or shortness of breath  CARDIOVASCULAR: No chest pain, palpitations  GASTROINTESTINAL: No abdominal pain, nausea, vomiting, or diarrhea  GENITOURINARY: No dysuria, frequency, or hematuria  NEUROLOGICAL: no focal weakness or dizziness  MUSCULOSKELETAL: no myalgias  PSYCH: no recent changes in mood    RADIOLOGY & ADDITIONAL TESTS:    Imaging Personally Reviewed:  [x] YES  [ ] NO    Consultant(s) Notes Reviewed:  [x] YES  [ ] NO    PHYSICAL EXAM:  T(C): 36.7 (04-20-24 @ 11:30), Max: 36.8 (04-19-24 @ 18:27)  HR: 70 (04-20-24 @ 11:30) (69 - 76)  BP: 107/57 (04-20-24 @ 11:30) (106/55 - 124/67)  RR: 17 (04-20-24 @ 11:30) (17 - 45)  SpO2: 97% (04-20-24 @ 11:30) (95% - 98%)    GENERAL: NAD, well-developed, well-groomed  HEENT:  anicteric, moist mucous membranes  CHEST/LUNG:  CTA b/l, no rales, wheezes, or rhonchi  HEART:  RRR, S1, S2  ABDOMEN:  BS+, soft, nontender, nondistended  EXTREMITIES: no edema  NERVOUS SYSTEM: answers questions and follows commands appropriately  PSYCH: normal affect    Care Discussed with Consultants/Other Providers [x] YES  [ ] NO

## 2024-04-20 NOTE — PROGRESS NOTE ADULT - SUBJECTIVE AND OBJECTIVE BOX
[facility] -- Luc Church, Waylon Ibrahim Savella, , Chelly Hernandez  Office # [phone]    Follow Up:      Subjective/Observations:     REVIEW OF SYSTEMS: All other review of systems is negative unless indicated above  PAST MEDICAL & SURGICAL HISTORY:  Diabetes      HTN (hypertension)      CAD (coronary artery disease)      HLD (hyperlipidemia)      Bladder cancer      Aortic stenosis      S/P TAVR (transcatheter aortic valve replacement)      Mitral valve replaced      History of transurethral resection of bladder tumor (TURBT)        MEDICATIONS  (STANDING):  aspirin enteric coated 81 milliGRAM(s) Oral daily  atorvastatin 80 milliGRAM(s) Oral at bedtime  dextrose 50% Injectable 25 Gram(s) IV Push once  dextrose 50% Injectable 25 Gram(s) IV Push once  dextrose 50% Injectable 12.5 Gram(s) IV Push once  dextrose Oral Gel 15 Gram(s) Oral once  glucagon  Injectable 1 milliGRAM(s) IntraMuscular once  heparin  Infusion.  Unit(s)/Hr (8 mL/Hr) IV Continuous <Continuous>  insulin lispro (ADMELOG) corrective regimen sliding scale   SubCutaneous Before meals and at bedtime  metoprolol succinate ER 25 milliGRAM(s) Oral daily  polyethylene glycol 3350 17 Gram(s) Oral daily  senna 2 Tablet(s) Oral at bedtime  ticagrelor 90 milliGRAM(s) Oral every 12 hours    MEDICATIONS  (PRN):  heparin   Injectable 4100 Unit(s) IV Push every 6 hours PRN For aPTT less than 40  HYDROmorphone  Injectable 0.5 milliGRAM(s) IV Push every 6 hours PRN Moderate Pain (4 - 6)    Allergies    Cipro (Other)  simvastatin (Other)  lovastatin (Other)  sulfa drugs (Other)    Intolerances      Vital Signs Last 24 Hrs  T(C): 36.2 (20 Apr 2024 04:30), Max: 36.9 (19 Apr 2024 12:15)  T(F): 97.2 (20 Apr 2024 04:30), Max: 98.4 (19 Apr 2024 12:15)  HR: 72 (20 Apr 2024 04:30) (67 - 83)  BP: 124/67 (20 Apr 2024 04:30) (92/49 - 137/62)  BP(mean): 76 (19 Apr 2024 18:00) (61 - 89)  RR: 18 (20 Apr 2024 04:30) (18 - 45)  SpO2: 96% (20 Apr 2024 04:30) (88% - 99%)    Parameters below as of 20 Apr 2024 04:30  Patient On (Oxygen Delivery Method): room air      I&O's Summary    18 Apr 2024 07:01  -  19 Apr 2024 07:00  --------------------------------------------------------  IN: 902.5 mL / OUT: 1800 mL / NET: -897.5 mL    19 Apr 2024 07:01  -  20 Apr 2024 06:36  --------------------------------------------------------  IN: 104.5 mL / OUT: 650 mL / NET: -545.5 mL        PHYSICAL EXAM:  TELE:   Constitutional: NAD, awake and alert, well-developed  HEENT: Moist Mucous Membranes, Anicteric  Pulmonary: Non-labored, breath sounds are clear bilaterally, No wheezing, rales or rhonchi  Cardiovascular: Regular, S1 and S2, No murmurs, rubs, gallops or clicks  Gastrointestinal: Bowel Sounds present, soft, nontender.   Lymph: No peripheral edema. No lymphadenopathy.  Skin: No visible rashes or ulcers.  Psych:  Mood & affect appropriate  LABS: All Labs Reviewed:                        11.0   8.88  )-----------( 233      ( 20 Apr 2024 04:41 )             33.4                         11.3   9.00  )-----------( 238      ( 19 Apr 2024 06:41 )             33.9                         10.9   9.50  )-----------( 239      ( 18 Apr 2024 17:40 )             32.0     20 Apr 2024 04:41    143    |  106    |  27     ----------------------------<  145    3.7     |  29     |  1.50   19 Apr 2024 06:41    141    |  105    |  26     ----------------------------<  206    4.4     |  31     |  1.50   18 Apr 2024 05:26    142    |  106    |  31     ----------------------------<  166    4.0     |  31     |  1.60     Ca    9.3        20 Apr 2024 04:41  Ca    9.6        19 Apr 2024 06:41  Ca    9.6        18 Apr 2024 05:26  Phos  3.0       20 Apr 2024 04:41  Phos  3.2       19 Apr 2024 06:41  Phos  2.9       18 Apr 2024 05:26  Mg     2.0       20 Apr 2024 04:41  Mg     1.9       19 Apr 2024 06:41  Mg     2.0       18 Apr 2024 05:26    TPro  7.0    /  Alb  3.1    /  TBili  0.8    /  DBili  x      /  AST  95     /  ALT  30     /  AlkPhos  50     20 Apr 2024 04:41  TPro  6.9    /  Alb  3.4    /  TBili  0.5    /  DBili  x      /  AST  90     /  ALT  29     /  AlkPhos  56     18 Apr 2024 05:26  TPro  8.0    /  Alb  3.8    /  TBili  0.5    /  DBili  x      /  AST  24     /  ALT  27     /  AlkPhos  63     17 Apr 2024 18:30    PT/INR - ( 20 Apr 2024 04:41 )   PT: 12.3 sec;   INR: 1.05 ratio         PTT - ( 20 Apr 2024 04:41 )  PTT:55.9 sec  CARDIAC MARKERS ( 19 Apr 2024 15:45 )  x     / x     / 436 U/L / x     / 14.4 ng/mL  CARDIAC MARKERS ( 19 Apr 2024 08:30 )  x     / x     / 629 U/L / x     / 29.9 ng/mL  CARDIAC MARKERS ( 18 Apr 2024 17:40 )  x     / x     / 919 U/L / x     / 85.5 ng/mL  CARDIAC MARKERS ( 18 Apr 2024 12:45 )  x     / x     / 888 U/L / x     / 96.9 ng/mL  CARDIAC MARKERS ( 18 Apr 2024 09:00 )  x     / x     / 747 U/L / x     / 88.2 ng/mL      12 Lead ECG:   Ventricular Rate 81 BPM    Atrial Rate 81 BPM    P-R Interval 196 ms    QRS Duration 170 ms    Q-T Interval 442 ms    QTC Calculation(Bazett) 513 ms    P Axis 66 degrees    R Axis -15 degrees    T Axis 150 degrees    Diagnosis Line Normal sinus rhythm  Left bundle branch block  Abnormal ECG  When compared with ECG of 18-APR-2024 13:43,  No significant change was found  Confirmed by Irma Hernandez ([id number]) on 4/19/2024 2:38:26 PM (04-19-24 @ 09:17)      TRANSTHORACIC ECHOCARDIOGRAM REPORT  ________________________________________________________________________________                                      _______       Pt. Name:       NAN ASCENCIO Study Date:    4/18/2024  MRN:            [id number]    YOB: 1926  Accession #:    [id number]    Age:           89 years  Account#:       [id number]   Gender:        M  Visit ID#  Heart Rate:                  Height:        68.90 in (175.00 cm)  Rhythm:                      Weight: 149.91 lb (68.00 kg)  Blood Pressure: 139/81 mmHg  BSA/BMI:       1.83 m² / 22.20 kg/m²  ________________________________________________________________________________________  Referring Physician:    [id number] Mini Lyons  Interpreting Physician: Sejal Spaulding MD  Primary Sonographer:    Tonya Valladares DARLENE    CPT:               ECHO TTE W/O CON F/U LTD - 90563.m  Indication(s):     Chest pain, unspecified - R07.9  Procedure:         Transthoracic echocardiogram with 2-D, M-mode and complete                     spectral and color flow Doppler.  Ordering Location: ICU1  Admission Status:  Inpatient  Study Information: Image quality for this study is technically difficult.    _______________________________________________________________________________________     CONCLUSIONS:      1. Technically difficult image quality.   2. Left ventricular endocardium is not well visualized; however, the left ventricular systolic function appears grossly normal.   3. Cannot rule out LV segmental abnormalities given limited images.   4. Normal right ventricular cavity size and probably normal systolic function.   5. The left atrium is mildly dilated.   6. Pt has history of TAVR. The aortic valve is not well visualized and Doppler interrogation was not performed.   7. Pt has history of mitral valve replacement. The mitral valve is not well visualized and Doppler interrogation was not performed.   8. Mild mitral regurgitation.   9. Tricuspid valve was not well visualized.    ________________________________________________________________________________________  FINDINGS:     Left Ventricle:  Left ventricular endocardium is not well visualized; however, the left ventricular systolic function appears grossly normal. Cannot rule out LV segmental abnormalities given limited images.     Right Ventricle:  The right ventricular cavity is normal in size and probably normal systolic function.     Left Atrium:  The left atrium is mildly dilated.     Right Atrium:  The right atrium is normal in size.     Aortic Valve:  Pt has history of TAVR. The aortic valve is not well visualized and Doppler interrogation was not performed.     Mitral Valve:  Pt has history of mitral valve replacement. The mitral valve is not well visualized and Doppler interrogation was not performed. There is mild mitral regurgitation.     Tricuspid Valve:  The tricuspid valve was not well visualized.     Pulmonic Valve:  The pulmonic valve was not well visualized.     Pericardium:  No pericardial effusion seen.  ____________________________________________________________________  QUANTITATIVE DATA:  Left Ventricle Measurements: (Indexed to BSA)     MV E Vmax: 1.36 m/s  MV A Vmax: 1.67 m/s  MV E/A:    0.81  MV DT:     584 msec    Aorta Measurements: (Normal range) (Indexed to BSA)     Sinuses of Valsalva: 2.40 cm (3.1 - 3.7 cm)       Left Atrium Measurements: (Indexed to BSA)  LA Diam 2D: 4.00 cm    Mitral Valve Measurements:     MV E Vmax: 1.4 m/s  MV A Vmax: 1.7 m/s  MV E/A:    0.8    ________________________________________________________________________________________  Electronically signed on 4/19/2024 at 7:39:32 AM by Sejal Spaulding MD         *** Final ***      [facility] -- Luc Church, Waylon Ibrahim Savella, , Chelly Hernandez  Office # [phone]    Follow Up:  NSTEMI    Subjective/Observations: Awake and alert, conversing on the phone.  No discomfort noted.  Comfortable on RA.  Denies CP or palpitations.  No tele events    REVIEW OF SYSTEMS: All other review of systems is negative unless indicated above  PAST MEDICAL & SURGICAL HISTORY:  Diabetes      HTN (hypertension)      CAD (coronary artery disease)      HLD (hyperlipidemia)      Bladder cancer      Aortic stenosis      S/P TAVR (transcatheter aortic valve replacement)      Mitral valve replaced      History of transurethral resection of bladder tumor (TURBT)        MEDICATIONS  (STANDING):  aspirin enteric coated 81 milliGRAM(s) Oral daily  atorvastatin 80 milliGRAM(s) Oral at bedtime  dextrose 50% Injectable 25 Gram(s) IV Push once  dextrose 50% Injectable 25 Gram(s) IV Push once  dextrose 50% Injectable 12.5 Gram(s) IV Push once  dextrose Oral Gel 15 Gram(s) Oral once  glucagon  Injectable 1 milliGRAM(s) IntraMuscular once  heparin  Infusion.  Unit(s)/Hr (8 mL/Hr) IV Continuous <Continuous>  insulin lispro (ADMELOG) corrective regimen sliding scale   SubCutaneous Before meals and at bedtime  metoprolol succinate ER 25 milliGRAM(s) Oral daily  polyethylene glycol 3350 17 Gram(s) Oral daily  senna 2 Tablet(s) Oral at bedtime  ticagrelor 90 milliGRAM(s) Oral every 12 hours    MEDICATIONS  (PRN):  heparin   Injectable 4100 Unit(s) IV Push every 6 hours PRN For aPTT less than 40  HYDROmorphone  Injectable 0.5 milliGRAM(s) IV Push every 6 hours PRN Moderate Pain (4 - 6)    Allergies    Cipro (Other)  simvastatin (Other)  lovastatin (Other)  sulfa drugs (Other)    Intolerances      Vital Signs Last 24 Hrs  T(C): 36.2 (20 Apr 2024 04:30), Max: 36.9 (19 Apr 2024 12:15)  T(F): 97.2 (20 Apr 2024 04:30), Max: 98.4 (19 Apr 2024 12:15)  HR: 72 (20 Apr 2024 04:30) (67 - 83)  BP: 124/67 (20 Apr 2024 04:30) (92/49 - 137/62)  BP(mean): 76 (19 Apr 2024 18:00) (61 - 89)  RR: 18 (20 Apr 2024 04:30) (18 - 45)  SpO2: 96% (20 Apr 2024 04:30) (88% - 99%)    Parameters below as of 20 Apr 2024 04:30  Patient On (Oxygen Delivery Method): room air      I&O's Summary    18 Apr 2024 07:01  -  19 Apr 2024 07:00  --------------------------------------------------------  IN: 902.5 mL / OUT: 1800 mL / NET: -897.5 mL    19 Apr 2024 07:01  -  20 Apr 2024 06:36  --------------------------------------------------------  IN: 104.5 mL / OUT: 650 mL / NET: -545.5 mL        PHYSICAL EXAM:  TELE: NSR  Constitutional: NAD, awake and alert, well-developed  HEENT: Moist Mucous Membranes, Anicteric  Pulmonary: Non-labored, breath sounds are clear bilaterally, No wheezing, rales or rhonchi  Cardiovascular: Regular, S1 and S2, +murmurs, no rubs, gallops or clicks  Gastrointestinal: Bowel Sounds present, soft, nontender.   Lymph: No peripheral edema. No lymphadenopathy.  Skin: No visible rashes or ulcers.  Psych:  Mood & affect appropriate  LABS: All Labs Reviewed:                        11.0   8.88  )-----------( 233      ( 20 Apr 2024 04:41 )             33.4                         11.3   9.00  )-----------( 238      ( 19 Apr 2024 06:41 )             33.9                         10.9   9.50  )-----------( 239      ( 18 Apr 2024 17:40 )             32.0     20 Apr 2024 04:41    143    |  106    |  27     ----------------------------<  145    3.7     |  29     |  1.50   19 Apr 2024 06:41    141    |  105    |  26     ----------------------------<  206    4.4     |  31     |  1.50   18 Apr 2024 05:26    142    |  106    |  31     ----------------------------<  166    4.0     |  31     |  1.60     Ca    9.3        20 Apr 2024 04:41  Ca    9.6        19 Apr 2024 06:41  Ca    9.6        18 Apr 2024 05:26  Phos  3.0       20 Apr 2024 04:41  Phos  3.2       19 Apr 2024 06:41  Phos  2.9       18 Apr 2024 05:26  Mg     2.0       20 Apr 2024 04:41  Mg     1.9       19 Apr 2024 06:41  Mg     2.0       18 Apr 2024 05:26    TPro  7.0    /  Alb  3.1    /  TBili  0.8    /  DBili  x      /  AST  95     /  ALT  30     /  AlkPhos  50     20 Apr 2024 04:41  TPro  6.9    /  Alb  3.4    /  TBili  0.5    /  DBili  x      /  AST  90     /  ALT  29     /  AlkPhos  56     18 Apr 2024 05:26  TPro  8.0    /  Alb  3.8    /  TBili  0.5    /  DBili  x      /  AST  24     /  ALT  27     /  AlkPhos  63     17 Apr 2024 18:30    PT/INR - ( 20 Apr 2024 04:41 )   PT: 12.3 sec;   INR: 1.05 ratio         PTT - ( 20 Apr 2024 04:41 )  PTT:55.9 sec  CARDIAC MARKERS ( 19 Apr 2024 15:45 )  x     / x     / 436 U/L / x     / 14.4 ng/mL  CARDIAC MARKERS ( 19 Apr 2024 08:30 )  x     / x     / 629 U/L / x     / 29.9 ng/mL  CARDIAC MARKERS ( 18 Apr 2024 17:40 )  x     / x     / 919 U/L / x     / 85.5 ng/mL  CARDIAC MARKERS ( 18 Apr 2024 12:45 )  x     / x     / 888 U/L / x     / 96.9 ng/mL  CARDIAC MARKERS ( 18 Apr 2024 09:00 )  x     / x     / 747 U/L / x     / 88.2 ng/mL      12 Lead ECG:   Ventricular Rate 81 BPM    Atrial Rate 81 BPM    P-R Interval 196 ms    QRS Duration 170 ms    Q-T Interval 442 ms    QTC Calculation(Bazett) 513 ms    P Axis 66 degrees    R Axis -15 degrees    T Axis 150 degrees    Diagnosis Line Normal sinus rhythm  Left bundle branch block  Abnormal ECG  When compared with ECG of 18-APR-2024 13:43,  No significant change was found  Confirmed by Irma Hernandez ([id number]) on 4/19/2024 2:38:26 PM (04-19-24 @ 09:17)      TRANSTHORACIC ECHOCARDIOGRAM REPORT  ________________________________________________________________________________                                      _______       Pt. Name:       NAN ASCENCIO Study Date:    4/18/2024  MRN:            [id number]    YOB: 1926  Accession #:    [id number]    Age:           89 years  Account#:       [id number]   Gender:        M  Visit ID#  Heart Rate:                  Height:        68.90 in (175.00 cm)  Rhythm:                      Weight: 149.91 lb (68.00 kg)  Blood Pressure: 139/81 mmHg  BSA/BMI:       1.83 m² / 22.20 kg/m²  ________________________________________________________________________________________  Referring Physician:    [id number] Mini Lyons  Interpreting Physician: Sejal Spaulding MD  Primary Sonographer:    Tonya Valladares [facility]    CPT:               ECHO TTE W/O CON F/U LTD - 91833.m  Indication(s):     Chest pain, unspecified - R07.9  Procedure:         Transthoracic echocardiogram with 2-D, M-mode and complete                     spectral and color flow Doppler.  Ordering Location: ICU1  Admission Status:  Inpatient  Study Information: Image quality for this study is technically difficult.    _______________________________________________________________________________________     CONCLUSIONS:      1. Technically difficult image quality.   2. Left ventricular endocardium is not well visualized; however, the left ventricular systolic function appears grossly normal.   3. Cannot rule out LV segmental abnormalities given limited images.   4. Normal right ventricular cavity size and probably normal systolic function.   5. The left atrium is mildly dilated.   6. Pt has history of TAVR. The aortic valve is not well visualized and Doppler interrogation was not performed.   7. Pt has history of mitral valve replacement. The mitral valve is not well visualized and Doppler interrogation was not performed.   8. Mild mitral regurgitation.   9. Tricuspid valve was not well visualized.    ________________________________________________________________________________________  FINDINGS:     Left Ventricle:  Left ventricular endocardium is not well visualized; however, the left ventricular systolic function appears grossly normal. Cannot rule out LV segmental abnormalities given limited images.     Right Ventricle:  The right ventricular cavity is normal in size and probably normal systolic function.     Left Atrium:  The left atrium is mildly dilated.     Right Atrium:  The right atrium is normal in size.     Aortic Valve:  Pt has history of TAVR. The aortic valve is not well visualized and Doppler interrogation was not performed.     Mitral Valve:  Pt has history of mitral valve replacement. The mitral valve is not well visualized and Doppler interrogation was not performed. There is mild mitral regurgitation.     Tricuspid Valve:  The tricuspid valve was not well visualized.     Pulmonic Valve:  The pulmonic valve was not well visualized.     Pericardium:  No pericardial effusion seen.  ____________________________________________________________________  QUANTITATIVE DATA:  Left Ventricle Measurements: (Indexed to BSA)     MV E Vmax: 1.36 m/s  MV A Vmax: 1.67 m/s  MV E/A:    0.81  MV DT:     584 msec    Aorta Measurements: (Normal range) (Indexed to BSA)     Sinuses of Valsalva: 2.40 cm (3.1 - 3.7 cm)       Left Atrium Measurements: (Indexed to BSA)  LA Diam 2D: 4.00 cm    Mitral Valve Measurements:     MV E Vmax: 1.4 m/s  MV A Vmax: 1.7 m/s  MV E/A:    0.8    ________________________________________________________________________________________  Electronically signed on 4/19/2024 at 7:39:32 AM by Sejal Spaulding MD         *** Final ***      [facility] -- Luc Church, Waylon Ibrahim Savella, , Chelly Hernandez  Office # [phone]    Follow Up:  NSTEMI    Subjective/Observations: Awake and alert, conversing on the phone.  No discomfort noted.  Comfortable on RA.  Denies CP or palpitations.  No tele events    REVIEW OF SYSTEMS: All other review of systems is negative unless indicated above  PAST MEDICAL & SURGICAL HISTORY:  Diabetes      HTN (hypertension)      CAD (coronary artery disease)      HLD (hyperlipidemia)      Bladder cancer      Aortic stenosis      S/P TAVR (transcatheter aortic valve replacement)      Mitral valve replaced      History of transurethral resection of bladder tumor (TURBT)        MEDICATIONS  (STANDING):  aspirin enteric coated 81 milliGRAM(s) Oral daily  atorvastatin 80 milliGRAM(s) Oral at bedtime  dextrose 50% Injectable 25 Gram(s) IV Push once  dextrose 50% Injectable 25 Gram(s) IV Push once  dextrose 50% Injectable 12.5 Gram(s) IV Push once  dextrose Oral Gel 15 Gram(s) Oral once  glucagon  Injectable 1 milliGRAM(s) IntraMuscular once  heparin  Infusion.  Unit(s)/Hr (8 mL/Hr) IV Continuous <Continuous>  insulin lispro (ADMELOG) corrective regimen sliding scale   SubCutaneous Before meals and at bedtime  metoprolol succinate ER 25 milliGRAM(s) Oral daily  polyethylene glycol 3350 17 Gram(s) Oral daily  senna 2 Tablet(s) Oral at bedtime  ticagrelor 90 milliGRAM(s) Oral every 12 hours    MEDICATIONS  (PRN):  heparin   Injectable 4100 Unit(s) IV Push every 6 hours PRN For aPTT less than 40  HYDROmorphone  Injectable 0.5 milliGRAM(s) IV Push every 6 hours PRN Moderate Pain (4 - 6)    Allergies    Cipro (Other)  simvastatin (Other)  lovastatin (Other)  sulfa drugs (Other)    Intolerances      Vital Signs Last 24 Hrs  T(C): 36.2 (20 Apr 2024 04:30), Max: 36.9 (19 Apr 2024 12:15)  T(F): 97.2 (20 Apr 2024 04:30), Max: 98.4 (19 Apr 2024 12:15)  HR: 72 (20 Apr 2024 04:30) (67 - 83)  BP: 124/67 (20 Apr 2024 04:30) (92/49 - 137/62)  BP(mean): 76 (19 Apr 2024 18:00) (61 - 89)  RR: 18 (20 Apr 2024 04:30) (18 - 45)  SpO2: 96% (20 Apr 2024 04:30) (88% - 99%)    Parameters below as of 20 Apr 2024 04:30  Patient On (Oxygen Delivery Method): room air      I&O's Summary    18 Apr 2024 07:01  -  19 Apr 2024 07:00  --------------------------------------------------------  IN: 902.5 mL / OUT: 1800 mL / NET: -897.5 mL    19 Apr 2024 07:01  -  20 Apr 2024 06:36  --------------------------------------------------------  IN: 104.5 mL / OUT: 650 mL / NET: -545.5 mL        PHYSICAL EXAM:  TELE: NSR  Constitutional: NAD, awake and alert, well-developed  HEENT: Moist Mucous Membranes, Anicteric  Pulmonary: Non-labored, breath sounds are clear bilaterally, No wheezing, rales or rhonchi  Cardiovascular: Regular, S1 and S2, +murmurs, no rubs, gallops or clicks  Gastrointestinal: Bowel Sounds present, soft, nontender.   Lymph: No peripheral edema. No lymphadenopathy.  Skin: No visible rashes or ulcers.  Psych:  Mood & affect appropriate  LABS: All Labs Reviewed:                        11.0   8.88  )-----------( 233      ( 20 Apr 2024 04:41 )             33.4                         11.3   9.00  )-----------( 238      ( 19 Apr 2024 06:41 )             33.9                         10.9   9.50  )-----------( 239      ( 18 Apr 2024 17:40 )             32.0     20 Apr 2024 04:41    143    |  106    |  27     ----------------------------<  145    3.7     |  29     |  1.50   19 Apr 2024 06:41    141    |  105    |  26     ----------------------------<  206    4.4     |  31     |  1.50   18 Apr 2024 05:26    142    |  106    |  31     ----------------------------<  166    4.0     |  31     |  1.60     Ca    9.3        20 Apr 2024 04:41  Ca    9.6        19 Apr 2024 06:41  Ca    9.6        18 Apr 2024 05:26  Phos  3.0       20 Apr 2024 04:41  Phos  3.2       19 Apr 2024 06:41  Phos  2.9       18 Apr 2024 05:26  Mg     2.0       20 Apr 2024 04:41  Mg     1.9       19 Apr 2024 06:41  Mg     2.0       18 Apr 2024 05:26    TPro  7.0    /  Alb  3.1    /  TBili  0.8    /  DBili  x      /  AST  95     /  ALT  30     /  AlkPhos  50     20 Apr 2024 04:41  TPro  6.9    /  Alb  3.4    /  TBili  0.5    /  DBili  x      /  AST  90     /  ALT  29     /  AlkPhos  56     18 Apr 2024 05:26  TPro  8.0    /  Alb  3.8    /  TBili  0.5    /  DBili  x      /  AST  24     /  ALT  27     /  AlkPhos  63     17 Apr 2024 18:30    PT/INR - ( 20 Apr 2024 04:41 )   PT: 12.3 sec;   INR: 1.05 ratio         PTT - ( 20 Apr 2024 04:41 )  PTT:55.9 sec  CARDIAC MARKERS ( 19 Apr 2024 15:45 )  x     / x     / 436 U/L / x     / 14.4 ng/mL  CARDIAC MARKERS ( 19 Apr 2024 08:30 )  x     / x     / 629 U/L / x     / 29.9 ng/mL  CARDIAC MARKERS ( 18 Apr 2024 17:40 )  x     / x     / 919 U/L / x     / 85.5 ng/mL  CARDIAC MARKERS ( 18 Apr 2024 12:45 )  x     / x     / 888 U/L / x     / 96.9 ng/mL  CARDIAC MARKERS ( 18 Apr 2024 09:00 )  x     / x     / 747 U/L / x     / 88.2 ng/mL      12 Lead ECG:   Ventricular Rate 81 BPM    Atrial Rate 81 BPM    P-R Interval 196 ms    QRS Duration 170 ms    Q-T Interval 442 ms    QTC Calculation(Bazett) 513 ms    P Axis 66 degrees    R Axis -15 degrees    T Axis 150 degrees    Diagnosis Line Normal sinus rhythm  Left bundle branch block  Abnormal ECG  When compared with ECG of 18-APR-2024 13:43,  No significant change was found  Confirmed by Irma Hernandez ([id number]) on 4/19/2024 2:38:26 PM (04-19-24 @ 09:17)      TRANSTHORACIC ECHOCARDIOGRAM REPORT  ________________________________________________________________________________                                      _______       Pt. Name:       NAN ASCENCIO Study Date:    4/18/2024  MRN:            [id number]    YOB: 1926  Accession #:    [id number]    Age:           89 years  Account#:       [id number]   Gender:        M  Visit ID#  Heart Rate:                  Height:        68.90 in (175.00 cm)  Rhythm:                      Weight: 149.91 lb (68.00 kg)  Blood Pressure: 139/81 mmHg  BSA/BMI:       1.83 m² / 22.20 kg/m²  ________________________________________________________________________________________  Referring Physician:    [id number] Mini Lyons  Interpreting Physician: Sejal Spaulding MD  Primary Sonographer:    Tonya Valladares [facility]    CPT:               ECHO TTE W/O CON F/U LTD - 72123.m  Indication(s):     Chest pain, unspecified - R07.9  Procedure:         Transthoracic echocardiogram with 2-D, M-mode and complete                     spectral and color flow Doppler.  Ordering Location: ICU1  Admission Status:  Inpatient  Study Information: Image quality for this study is technically difficult.    _______________________________________________________________________________________     CONCLUSIONS:      1. Technically difficult image quality.   2. Left ventricular endocardium is not well visualized; however, the left ventricular systolic function appears grossly normal.   3. Cannot rule out LV segmental abnormalities given limited images.   4. Normal right ventricular cavity size and probably normal systolic function.   5. The left atrium is mildly dilated.   6. Pt has history of TAVR. The aortic valve is not well visualized and Doppler interrogation was not performed.   7. Pt has history of mitral valve replacement. The mitral valve is not well visualized and Doppler interrogation was not performed.   8. Mild mitral regurgitation.   9. Tricuspid valve was not well visualized.    ________________________________________________________________________________________  FINDINGS:     Left Ventricle:  Left ventricular endocardium is not well visualized; however, the left ventricular systolic function appears grossly normal. Cannot rule out LV segmental abnormalities given limited images.     Right Ventricle:  The right ventricular cavity is normal in size and probably normal systolic function.     Left Atrium:  The left atrium is mildly dilated.     Right Atrium:  The right atrium is normal in size.     Aortic Valve:  Pt has history of TAVR. The aortic valve is not well visualized and Doppler interrogation was not performed.     Mitral Valve:  Pt has history of mitral valve replacement. The mitral valve is not well visualized and Doppler interrogation was not performed. There is mild mitral regurgitation.     Tricuspid Valve:  The tricuspid valve was not well visualized.     Pulmonic Valve:  The pulmonic valve was not well visualized.     Pericardium:  No pericardial effusion seen.  ____________________________________________________________________  QUANTITATIVE DATA:  Left Ventricle Measurements: (Indexed to BSA)     MV E Vmax: 1.36 m/s  MV A Vmax: 1.67 m/s  MV E/A:    0.81  MV DT:     584 msec    Aorta Measurements: (Normal range) (Indexed to BSA)     Sinuses of Valsalva: 2.40 cm (3.1 - 3.7 cm)       Left Atrium Measurements: (Indexed to BSA)  LA Diam 2D: 4.00 cm    Mitral Valve Measurements:     MV E Vmax: 1.4 m/s  MV A Vmax: 1.7 m/s  MV E/A:    0.8    ________________________________________________________________________________________  Electronically signed on 4/19/2024 at 7:39:32 AM by Sejal Spaulding MD         *** Final ***

## 2024-04-20 NOTE — PROVIDER CONTACT NOTE (CRITICAL VALUE NOTIFICATION) - ACTION/TREATMENT ORDERED:
Dr. Noonan made aware states he will call Cardiologist.
Pt on heparin drip
Dr Swift made aware, will follow up

## 2024-04-21 ENCOUNTER — TRANSCRIPTION ENCOUNTER (OUTPATIENT)
Age: 89
End: 2024-04-21

## 2024-04-21 VITALS
OXYGEN SATURATION: 98 % | DIASTOLIC BLOOD PRESSURE: 78 MMHG | HEART RATE: 69 BPM | SYSTOLIC BLOOD PRESSURE: 123 MMHG | RESPIRATION RATE: 18 BRPM | TEMPERATURE: 98 F

## 2024-04-21 LAB
ALBUMIN SERPL ELPH-MCNC: 2.9 G/DL — LOW (ref 3.3–5)
ALP SERPL-CCNC: 45 U/L — SIGNIFICANT CHANGE UP (ref 40–120)
ALT FLD-CCNC: 26 U/L — SIGNIFICANT CHANGE UP (ref 12–78)
ANION GAP SERPL CALC-SCNC: 6 MMOL/L — SIGNIFICANT CHANGE UP (ref 5–17)
APTT BLD: 26.4 SEC — SIGNIFICANT CHANGE UP (ref 24.5–35.6)
AST SERPL-CCNC: 52 U/L — HIGH (ref 15–37)
BILIRUB SERPL-MCNC: 0.6 MG/DL — SIGNIFICANT CHANGE UP (ref 0.2–1.2)
BUN SERPL-MCNC: 28 MG/DL — HIGH (ref 7–23)
CALCIUM SERPL-MCNC: 8.6 MG/DL — SIGNIFICANT CHANGE UP (ref 8.5–10.1)
CHLORIDE SERPL-SCNC: 106 MMOL/L — SIGNIFICANT CHANGE UP (ref 96–108)
CO2 SERPL-SCNC: 28 MMOL/L — SIGNIFICANT CHANGE UP (ref 22–31)
CREAT SERPL-MCNC: 1.4 MG/DL — HIGH (ref 0.5–1.3)
EGFR: 45 ML/MIN/1.73M2 — LOW
GLUCOSE SERPL-MCNC: 131 MG/DL — HIGH (ref 70–99)
HCT VFR BLD CALC: 30.7 % — LOW (ref 39–50)
HGB BLD-MCNC: 10.3 G/DL — LOW (ref 13–17)
MAGNESIUM SERPL-MCNC: 2.2 MG/DL — SIGNIFICANT CHANGE UP (ref 1.6–2.6)
MCHC RBC-ENTMCNC: 31.5 PG — SIGNIFICANT CHANGE UP (ref 27–34)
MCHC RBC-ENTMCNC: 33.6 GM/DL — SIGNIFICANT CHANGE UP (ref 32–36)
MCV RBC AUTO: 93.9 FL — SIGNIFICANT CHANGE UP (ref 80–100)
NRBC # BLD: 0 /100 WBCS — SIGNIFICANT CHANGE UP (ref 0–0)
PLATELET # BLD AUTO: 196 K/UL — SIGNIFICANT CHANGE UP (ref 150–400)
POTASSIUM SERPL-MCNC: 3.7 MMOL/L — SIGNIFICANT CHANGE UP (ref 3.5–5.3)
POTASSIUM SERPL-SCNC: 3.7 MMOL/L — SIGNIFICANT CHANGE UP (ref 3.5–5.3)
PROT SERPL-MCNC: 6.4 G/DL — SIGNIFICANT CHANGE UP (ref 6–8.3)
RBC # BLD: 3.27 M/UL — LOW (ref 4.2–5.8)
RBC # FLD: 13.3 % — SIGNIFICANT CHANGE UP (ref 10.3–14.5)
SODIUM SERPL-SCNC: 140 MMOL/L — SIGNIFICANT CHANGE UP (ref 135–145)
WBC # BLD: 7.29 K/UL — SIGNIFICANT CHANGE UP (ref 3.8–10.5)
WBC # FLD AUTO: 7.29 K/UL — SIGNIFICANT CHANGE UP (ref 3.8–10.5)

## 2024-04-21 PROCEDURE — 85027 COMPLETE CBC AUTOMATED: CPT

## 2024-04-21 PROCEDURE — 80048 BASIC METABOLIC PNL TOTAL CA: CPT

## 2024-04-21 PROCEDURE — 93308 TTE F-UP OR LMTD: CPT

## 2024-04-21 PROCEDURE — 83690 ASSAY OF LIPASE: CPT

## 2024-04-21 PROCEDURE — 82962 GLUCOSE BLOOD TEST: CPT

## 2024-04-21 PROCEDURE — 82550 ASSAY OF CK (CPK): CPT

## 2024-04-21 PROCEDURE — 99239 HOSP IP/OBS DSCHRG MGMT >30: CPT

## 2024-04-21 PROCEDURE — 84484 ASSAY OF TROPONIN QUANT: CPT

## 2024-04-21 PROCEDURE — 84100 ASSAY OF PHOSPHORUS: CPT

## 2024-04-21 PROCEDURE — 99232 SBSQ HOSP IP/OBS MODERATE 35: CPT

## 2024-04-21 PROCEDURE — 83735 ASSAY OF MAGNESIUM: CPT

## 2024-04-21 PROCEDURE — 93970 EXTREMITY STUDY: CPT

## 2024-04-21 PROCEDURE — 99291 CRITICAL CARE FIRST HOUR: CPT

## 2024-04-21 PROCEDURE — 85730 THROMBOPLASTIN TIME PARTIAL: CPT

## 2024-04-21 PROCEDURE — 80053 COMPREHEN METABOLIC PANEL: CPT

## 2024-04-21 PROCEDURE — 97161 PT EVAL LOW COMPLEX 20 MIN: CPT

## 2024-04-21 PROCEDURE — 87641 MR-STAPH DNA AMP PROBE: CPT

## 2024-04-21 PROCEDURE — 85610 PROTHROMBIN TIME: CPT

## 2024-04-21 PROCEDURE — 36415 COLL VENOUS BLD VENIPUNCTURE: CPT

## 2024-04-21 PROCEDURE — 93005 ELECTROCARDIOGRAM TRACING: CPT

## 2024-04-21 PROCEDURE — 87640 STAPH A DNA AMP PROBE: CPT

## 2024-04-21 PROCEDURE — 85025 COMPLETE CBC W/AUTO DIFF WBC: CPT

## 2024-04-21 PROCEDURE — 71045 X-RAY EXAM CHEST 1 VIEW: CPT

## 2024-04-21 PROCEDURE — 82553 CREATINE MB FRACTION: CPT

## 2024-04-21 PROCEDURE — 83036 HEMOGLOBIN GLYCOSYLATED A1C: CPT

## 2024-04-21 RX ORDER — LISINOPRIL 2.5 MG/1
1 TABLET ORAL
Qty: 30 | Refills: 0
Start: 2024-04-21 | End: 2024-05-20

## 2024-04-21 RX ORDER — FUROSEMIDE 40 MG
1 TABLET ORAL
Qty: 0 | Refills: 0 | DISCHARGE

## 2024-04-21 RX ORDER — LISINOPRIL 2.5 MG/1
1 TABLET ORAL
Qty: 0 | Refills: 0 | DISCHARGE

## 2024-04-21 RX ORDER — METOPROLOL TARTRATE 50 MG
1 TABLET ORAL
Qty: 30 | Refills: 0
Start: 2024-04-21 | End: 2024-05-20

## 2024-04-21 RX ORDER — CLOPIDOGREL BISULFATE 75 MG/1
1 TABLET, FILM COATED ORAL
Qty: 30 | Refills: 0
Start: 2024-04-21 | End: 2024-05-20

## 2024-04-21 RX ORDER — ATORVASTATIN CALCIUM 80 MG/1
1 TABLET, FILM COATED ORAL
Qty: 30 | Refills: 0
Start: 2024-04-21 | End: 2024-05-20

## 2024-04-21 RX ADMIN — CLOPIDOGREL BISULFATE 75 MILLIGRAM(S): 75 TABLET, FILM COATED ORAL at 12:04

## 2024-04-21 RX ADMIN — POLYETHYLENE GLYCOL 3350 17 GRAM(S): 17 POWDER, FOR SOLUTION ORAL at 12:04

## 2024-04-21 RX ADMIN — Medication 2: at 12:50

## 2024-04-21 RX ADMIN — Medication 25 MILLIGRAM(S): at 05:44

## 2024-04-21 RX ADMIN — Medication 81 MILLIGRAM(S): at 12:04

## 2024-04-21 NOTE — CAREGIVER ENGAGEMENT NOTE - CAREGIVER EDUCATION - TYPES DISCUSSED
declined for CM to arrange home care services. Also declining outpatient PT.  Patient confirmed he has a RW./Discharge plan

## 2024-04-21 NOTE — PHYSICAL THERAPY INITIAL EVALUATION ADULT - ADDITIONAL COMMENTS
Pt lives with wife, has 1 flight of stairs within home. Ambulates with rolling walker but otherwise independent with ADL's. Pt reports his wife watches him while in the shower for safety precaution.

## 2024-04-21 NOTE — DISCHARGE NOTE PROVIDER - CARE PROVIDER_API CALL
Bennett Connors  Cardiology  [address] [place] [address]  Phone: [phone]  Fax: [phone]  Follow Up Time: 1 week   SAI FULTON  [address] [place] [address]  Phone: [phone]  Fax: [phone]  Established Patient  Follow Up Time: 1 week

## 2024-04-21 NOTE — PHYSICAL THERAPY INITIAL EVALUATION ADULT - GAIT TRAINING, PT EVAL
(3 - 5 sessions) Pt will ambulate 300 feet using rolling walker with modified independence to improve mobility upon d/c.

## 2024-04-21 NOTE — PROGRESS NOTE ADULT - NS ATTEND AMEND GEN_ALL_CORE FT
89yo M with pmhx HTN, CAD, HLD, DM2, mitral valve replacement, aortic stenosis s/p TAVR (2017), bladder cancer s/p TURBT (2023), , ulcerative colitis presenting with chest pain and ST changes on EKG     NSTEMI vs STEMI  - Initial EKG with SR LBBB with discordant LAINE >2mm in inferior leads.   - likely pt presented with MI which is supported by cardiac biomarker trend.   - Troponin peaked and downtrended.  No need to monitor  - Spoke to pt regarding coronary angiography and risk. Given his age, resolution of EKG changes, CKD, will medically manage for now.  If sx change will move forward with urgent coronary cath. Case discussed with Dr. Shipman who is in agreement.   - He remains symptom free   - Cont DAPT, switched from Brilinta to Plavix  - S/p 48 hrs of heparin  - Continue BB, uptitrate as tolerated  - Continue statin  - TTE with poor visualization but with grossly normal LV function    - Appears compensated from HF POV.  Hold diuretics.   - Monitor and replete electrolytes. Keep K>4.0 and Mg>2.0.   - DC planning  Close cardiology follow up
89yo M with pmhx HTN, CAD, HLD, DM2, mitral valve replacement, aortic stenosis s/p TAVR (2017), bladder cancer s/p TURBT (2023), , ulcerative colitis presenting with chest pain and ST changes on EKG     With ACS, being medically managed given his advanced age  Trops peaked, no need to trend further   - S/p heparin x 48 hours  Continue statin  Metoprolol initiated  He remains chest pain free  Given advanced age and risk for bleeding, would switch from Brilinta to Plavix with re-load. To continue 75mg of Plavix daily starting tomorrow.   - TTE with grossly preserved LV function  Appears euvolemic

## 2024-04-21 NOTE — DISCHARGE NOTE NURSING/CASE MANAGEMENT/SOCIAL WORK - PATIENT PORTAL LINK FT
You can access the FollowMyHealth Patient Portal offered by [facility] by registering at the following website: [email or web address]. By joining [employer]’s FollowMyHealth portal, you will also be able to view your health information using other applications (apps) compatible with our system.

## 2024-04-21 NOTE — DISCHARGE NOTE PROVIDER - NSDCMRMEDTOKEN_GEN_ALL_CORE_FT
alogliptin 25 mg oral tablet: 1 tab(s) orally once a day  aspirin 81 mg oral tablet: 1 tab(s) orally once a day  Flomax 0.4 mg oral capsule: 1 cap(s) orally once a day (at bedtime)   furosemide 40 mg oral tablet: 1 tab(s) orally once a day  glipiZIDE 10 mg oral tablet: 1 tab(s) orally 2 times a day  lisinopril 5 mg oral tablet: 1 tab(s) orally once a day  metFORMIN 1000 mg oral tablet: 1 tab(s) orally 2 times a day  pravastatin 40 mg oral tablet: 1 tab(s) orally once a day  Vitamin B12 1000 mcg oral tablet: 1 tab(s) orally once a day   alogliptin 25 mg oral tablet: 1 tab(s) orally once a day  aspirin 81 mg oral tablet: 1 tab(s) orally once a day  atorvastatin 80 mg oral tablet: 1 tab(s) orally once a day (at bedtime)  clopidogrel 75 mg oral tablet: 1 tab(s) orally once a day  Flomax 0.4 mg oral capsule: 1 cap(s) orally once a day (at bedtime)   glipiZIDE 10 mg oral tablet: 1 tab(s) orally 2 times a day  lisinopril 2.5 mg oral tablet: 1 tab(s) orally once a day  metFORMIN 1000 mg oral tablet: 1 tab(s) orally 2 times a day  metoprolol succinate 25 mg oral tablet, extended release: 1 tab(s) orally once a day  Outpatient physical therapy: Please evaluate and treat    ICD10: I24. 9  Vitamin B12 1000 mcg oral tablet: 1 tab(s) orally once a day

## 2024-04-21 NOTE — DISCHARGE NOTE PROVIDER - HOSPITAL COURSE
89 y/o M PMH CAD, AS s/p TAVR, DM2, s/p MVR, bladder cancer s/p TURBT who presents to the ED for evaluation of sudden onset of midsternal chest pain that started after eating. Patient reports it radiates to bilateral upper extremities. He did report diaphoretic and short of breath. He was given ASA 325mg and nitro prior to arrival.     He had elevated troponins and EKG changes on arrival. He was seen by interventional cardiology and recommended heparin gtt and brilinta load. Patient was admitted for monitoring of ACS. He was treated medically given age and risk vs benefits of cardiac cath with renal insufficiency. Patient improved clinically with medical management. Heparin gtt was discontinued after 48 hours. He was switched to plavix. He remained chest pain free during admission.     consults:   cardio: Dr Connors  interventional cardiology: Dr Shipman

## 2024-04-21 NOTE — DISCHARGE NOTE PROVIDER - PROVIDER TOKENS
PROVIDER:[TOKEN:[[id number]],FOLLOWUP:[1 week]] PROVIDER:[TOKEN:[[id number]],FOLLOWUP:[1 week],ESTABLISHEDPATIENT:[T]]

## 2024-04-21 NOTE — CAREGIVER ENGAGEMENT NOTE - CAREGIVER OUTREACH NOTES - FREE TEXT
Per MD, patient is medically cleared for discharge home today. CM met with the patient, his spouse and nephew at the bedside to discuss transition planning. Permission obtained from the patient to speak with family present. PT recommending outpatient PT. CM discussed PT recommendation for outpatient PT. Patient and patient's spouse declined and patient stated he does not need outpatient PT. CM discussed home care services for a visiting nurse and PT if preferred in the home. Patient and spouse declined. Patient's spouse and nephew transport the patient to medical appointments and stated they manage medications independently. Patient and spouse verbalized understanding of the transition plan for today and are in agreement. IMM explained and provided to the patient/patient's spouse signed as per patient's request. CM remains available.

## 2024-04-21 NOTE — DISCHARGE NOTE PROVIDER - NSDCCPCAREPLAN_GEN_ALL_CORE_FT
PRINCIPAL DISCHARGE DIAGNOSIS  Diagnosis: ACS (acute coronary syndrome)  Assessment and Plan of Treatment: You were admitted for acute coronary syndrome.   Given age and co-morbidities, you were medically treated with anticoagulation while you were in the hospital.   You were seen by interventional cardiologist and cardiology during hospitalization.   Please continue aspirin 81mg daily, Clopidogrel 75mg daily, metoprolol and atorvastatin 80mg nightly.  Please stop your pravastatin. You will need to switch to atorvastatin.   Please stop your lisinopril 5mg daily, we will switch it to 2.5mg daily. You can cut the remaining tablets of lisinopril 5mg daily in half if needed.   Please follow up with cardiology in the office.     PRINCIPAL DISCHARGE DIAGNOSIS  Diagnosis: ACS (acute coronary syndrome)  Assessment and Plan of Treatment: You were admitted for acute coronary syndrome.   Given age and co-morbidities, you were medically treated with anticoagulation while you were in the hospital.   You were seen by interventional cardiologist and cardiology during hospitalization.   Please continue aspirin 81mg daily, Clopidogrel 75mg daily, metoprolol and atorvastatin 80mg nightly.  Please stop your pravastatin. You will need to switch to atorvastatin.   Please stop your lisinopril 5mg daily, we will switch it to 2.5mg daily. You can cut the remaining tablets of lisinopril 5mg daily in half if needed.   Echo 4/18/21:   1. Technically difficult image quality.   2. Left ventricular endocardium is not well visualized; however, the left ventricular systolic function appears grossly normal.   3. Cannot rule out LV segmental abnormalities given limited images.   4. Normal right ventricular cavity size and probably normal systolic function.   5. The left atrium is mildly dilated.   6. Pt has history of TAVR. The aortic valve is not well visualized and Doppler interrogation was not performed.   7. Pt has history of mitral valve replacement. The mitral valve is not well visualized and Doppler interrogation was not performed.   8. Mild mitral regurgitation.   9. Tricuspid valve was not well visualized.  Please follow up with cardiology in the office.

## 2024-04-21 NOTE — PHYSICAL THERAPY INITIAL EVALUATION ADULT - TRANSFER TRAINING, PT EVAL
(3 - 5 sessions) Pt will be able to transfer sit to stand mod Independent with rolling walker to improve mobility.

## 2024-04-21 NOTE — DISCHARGE NOTE NURSING/CASE MANAGEMENT/SOCIAL WORK - NSDCPEFALRISK_GEN_ALL_CORE
For information on Fall & Injury Prevention, visit: [email or web address] OR  [email or web address] OR  https://www.cdc.gov/steadi/patient.html

## 2024-04-21 NOTE — PROGRESS NOTE ADULT - SUBJECTIVE AND OBJECTIVE BOX
[facility] - Luc Church, [place], [place], David Frias  Office Number:  [phone]    Patient resting comfortably in bed in NAD.  Laying flat with no respiratory distress.  No complaints of chest pain, dyspnea, palpitations, PND, or orthopnea.  No complaints this morning    ROS: negative unless otherwise mentioned.    Telemetry: SR 60-70    MEDICATIONS  (STANDING):  aspirin enteric coated 81 milliGRAM(s) Oral daily  atorvastatin 80 milliGRAM(s) Oral at bedtime  clopidogrel Tablet 75 milliGRAM(s) Oral daily  dextrose 50% Injectable 25 Gram(s) IV Push once  dextrose 50% Injectable 25 Gram(s) IV Push once  dextrose 50% Injectable 12.5 Gram(s) IV Push once  dextrose Oral Gel 15 Gram(s) Oral once  glucagon  Injectable 1 milliGRAM(s) IntraMuscular once  insulin lispro (ADMELOG) corrective regimen sliding scale   SubCutaneous Before meals and at bedtime  metoprolol succinate ER 25 milliGRAM(s) Oral daily  polyethylene glycol 3350 17 Gram(s) Oral daily  senna 2 Tablet(s) Oral at bedtime    MEDICATIONS  (PRN):  HYDROmorphone  Injectable 0.5 milliGRAM(s) IV Push every 6 hours PRN Moderate Pain (4 - 6)      Allergies    Cipro (Other)  simvastatin (Other)  lovastatin (Other)  sulfa drugs (Other)    Intolerances        Vital Signs Last 24 Hrs  T(C): 36.3 (21 Apr 2024 04:37), Max: 36.7 (20 Apr 2024 11:30)  T(F): 97.3 (21 Apr 2024 04:37), Max: 98.1 (20 Apr 2024 11:30)  HR: 79 (21 Apr 2024 10:25) (69 - 79)  BP: 132/57 (21 Apr 2024 10:25) (107/57 - 132/57)  BP(mean): --  RR: 18 (21 Apr 2024 04:37) (17 - 18)  SpO2: 98% (21 Apr 2024 10:25) (97% - 98%)    Parameters below as of 20 Apr 2024 11:30  Patient On (Oxygen Delivery Method): room air        I&O's Summary      ON EXAM:    Constitutional: NAD, awake and alert, well-developed  HEENT: Moist Mucous Membranes, Anicteric  Pulmonary: Non-labored, breath sounds are clear bilaterally, No wheezing, rales or rhonchi  Cardiovascular: Regular, S1 and S2, +murmurs, no rubs, gallops or clicks  Gastrointestinal: Bowel Sounds present, soft, nontender.   Lymph: No peripheral edema. No lymphadenopathy.  Skin: No visible rashes or ulcers.  Psych:  Mood & affect appropriate    LABS: All Labs Reviewed:                        10.3   7.29  )-----------( 196      ( 21 Apr 2024 07:03 )             30.7                         11.0   8.88  )-----------( 233      ( 20 Apr 2024 04:41 )             33.4                         11.3   9.00  )-----------( 238      ( 19 Apr 2024 06:41 )             33.9     21 Apr 2024 07:03    140    |  106    |  28     ----------------------------<  131    3.7     |  28     |  1.40   20 Apr 2024 04:41    143    |  106    |  27     ----------------------------<  145    3.7     |  29     |  1.50   19 Apr 2024 06:41    141    |  105    |  26     ----------------------------<  206    4.4     |  31     |  1.50     Ca    8.6        21 Apr 2024 07:03  Ca    9.3        20 Apr 2024 04:41  Ca    9.6        19 Apr 2024 06:41  Phos  3.0       20 Apr 2024 04:41  Phos  3.2       19 Apr 2024 06:41  Mg     2.2       21 Apr 2024 07:03  Mg     2.0       20 Apr 2024 04:41  Mg     1.9       19 Apr 2024 06:41    TPro  6.4    /  Alb  2.9    /  TBili  0.6    /  DBili  x      /  AST  52     /  ALT  26     /  AlkPhos  45     21 Apr 2024 07:03  TPro  7.0    /  Alb  3.1    /  TBili  0.8    /  DBili  x      /  AST  95     /  ALT  30     /  AlkPhos  50     20 Apr 2024 04:41    PT/INR - ( 20 Apr 2024 04:41 )   PT: 12.3 sec;   INR: 1.05 ratio         PTT - ( 21 Apr 2024 07:03 )  PTT:26.4 sec  CARDIAC MARKERS ( 19 Apr 2024 15:45 )  x     / x     / 436 U/L / x     / 14.4 ng/mL      Blood Culture:     Pt. Name:       NAN ASCENCIO Study Date:    4/18/2024  MRN:            [id number]    YOB: 1926  Accession #:    [id number]    Age:           89 years  Account#:       [id number]   Gender:        M  Visit ID#  Heart Rate:                  Height:        68.90 in (175.00 cm)  Rhythm:                      Weight: 149.91 lb (68.00 kg)  Blood Pressure: 139/81 mmHg  BSA/BMI:       1.83 m² / 22.20 kg/m²  ________________________________________________________________________________________  Referring Physician:    [id number] Mini Lyons  Interpreting Physician: Sejal Spaulding MD  Primary Sonographer:    Tonya Valladares RDCS    CPT:               ECHO TTE W/O CON F/U LTD - 49489.m  Indication(s):     Chest pain, unspecified - R07.9  Procedure:         Transthoracic echocardiogram with 2-D, M-mode and complete                     spectral and color flow Doppler.  Ordering Location: ICU1  Admission Status:  Inpatient  Study Information: Image quality for this study is technically difficult.    _______________________________________________________________________________________     CONCLUSIONS:      1. Technically difficult image quality.   2. Left ventricular endocardium is not well visualized; however, the left ventricular systolic function appears grossly normal.   3. Cannot rule out LV segmental abnormalities given limited images.   4. Normal right ventricular cavity size and probably normal systolic function.   5. The left atrium is mildly dilated.   6. Pt has history of TAVR. The aortic valve is not well visualized and Doppler interrogation was not performed.   7. Pt has history of mitral valve replacement. The mitral valve is not well visualized and Doppler interrogation was not performed.   8. Mild mitral regurgitation.   9. Tricuspid valve was not well visualized.

## 2024-04-21 NOTE — PHYSICAL THERAPY INITIAL EVALUATION ADULT - PERTINENT HX OF CURRENT PROBLEM, REHAB EVAL
89yo M with pmhx HTN, CAD, HLD, DM2, mitral valve replacement, bladder cancer s/p TURBT (2023), aortic stenosis s/p TAVR (2017), ulcerative colitis presenting with chest pain. Patient states around 4pm he developed mid retrosternal chest heaviness after eating a large lunch consisting of hot dogs, pork and vegetables. He states it radiated to b/l UE and he became diaphoretic and short of breath, didn't improve after 2hrs. EMS was called and he received 325mg aspirin, sublingual nitro en route. On arrival to the ED patient states his chest heaviness has greatly improved.  Pt found to have STEMI.

## 2024-04-21 NOTE — PHYSICAL THERAPY INITIAL EVALUATION ADULT - LEVEL OF INDEPENDENCE: SIT/STAND, REHAB EVAL
Prescription approved per [facility] Refill Protocol.  Maki Abdalla RN    supervision

## 2024-04-21 NOTE — DISCHARGE NOTE PROVIDER - CARE PROVIDERS DIRECT ADDRESSES
[email or web address]

## 2024-12-04 PROBLEM — C67.9 MALIGNANT NEOPLASM OF BLADDER, UNSPECIFIED: Chronic | Status: ACTIVE | Noted: 2024-01-01

## 2024-12-04 PROBLEM — I10 ESSENTIAL (PRIMARY) HYPERTENSION: Chronic | Status: ACTIVE | Noted: 2024-01-01

## 2024-12-04 PROBLEM — I25.10 ATHEROSCLEROTIC HEART DISEASE OF NATIVE CORONARY ARTERY WITHOUT ANGINA PECTORIS: Chronic | Status: ACTIVE | Noted: 2024-01-01

## 2024-12-04 PROBLEM — I35.0 NONRHEUMATIC AORTIC (VALVE) STENOSIS: Chronic | Status: ACTIVE | Noted: 2024-01-01

## 2024-12-04 PROBLEM — E78.5 HYPERLIPIDEMIA, UNSPECIFIED: Chronic | Status: ACTIVE | Noted: 2024-01-01

## 2024-12-04 NOTE — ED PROVIDER NOTE - PROGRESS NOTE DETAILS
Bradycardia noted.  EP NP's contacted by Dr Olivares, Bradycardia noted.  EP NP's contacted by Dr Olivares on arrival, requesting cardio evaluation. Dr Ibrahim consulted. while awaiting work up / consultants patient noted to have worsening bradycardia with ventricular pauses and with increased dizziness after.     GOC discussed with wife and family- no prior wished, full code at this time.    EP NPs recontacted. K+ noted to be elevated, shifting / excretion treatment ordered. Nephro consulted. TVP placed by EP NP. ICU accepted. patient admitted.

## 2024-12-04 NOTE — H&P ADULT - ASSESSMENT
89 yo M with PMH HTN, CAD, HLD, DM2, mitral valve replacement, bladder cancer s/p TURBT (2023), aortic stenosis s/p TAVR (2017), ulcerative colitis presents to the ED with 2 days of dizziness. Admitted for symptomatic bradycardia requiring transvenous pacing, plan for PPM placement with EP.  89 yo M with PMH HTN, CAD, HLD, DM2, mitral valve replacement, bladder cancer s/p TURBT (2023), aortic stenosis s/p TAVR (2017), ulcerative colitis presents to the ED with 2 days of dizziness. Admitted for symptomatic bradycardia requiring transvenous pacing, plan for PPM placement with EP; YANETH and electrolyte disturbances.  89 yo M with PMH HTN, CAD, HLD, DM2, mitral valve replacement, bladder cancer s/p TURBT (2023), BPH, aortic stenosis s/p TAVR (2017), ulcerative colitis presents to the ED with 2 days of dizziness. Admitted for symptomatic bradycardia requiring transvenous pacing, plan for PPM placement with EP; YANETH and electrolyte disturbances.  89 yo M with PMH HTN, CAD, HLD, DM2, mitral valve replacement, bladder cancer s/p TURBT (2023), BPH, aortic stenosis s/p TAVR (2017), ulcerative colitis presents to the ED with 2 days of dizziness. Admitted for symptomatic bradycardia requiring emergent  transvenous pacing for CHB  with  YANETH , Hyperkalemia - electrolyte disturbances, metabolic encephalopathy EP Eval for possible PPM placement.

## 2024-12-04 NOTE — H&P ADULT - PROBLEM SELECTOR PLAN 3
K 6.2   - Likely K 6.2   - Likely 2/2 obstructive uropathy  - S/P hyperkalemia protocol   - Repeat BMP per ICU K 6.2   - Likely 2/2 obstructive uropathy &   - S/P hyperkalemia protocol   - Repeat BMP per ICU #K 6.2   - S/P hyperkalemia protocol   - Repeat BMP per ICU    #Mg 1.5  - S/P Mag Sulfate 2mg IVPB x 1   - Repeat Mag per ICU AMS 2/2 Metabolic encephalopathy ,   Luis Manuel, Bradycardia,   Supportive care  [facility]  Hold home meds

## 2024-12-04 NOTE — H&P ADULT - ATTENDING COMMENTS
89 yo M with PMH HTN, CAD, HLD, DM2, mitral valve replacement, bladder cancer s/p TURBT (2023), BPH, aortic stenosis s/p TAVR (2017), ulcerative colitis presents to the ED with 2 days of dizziness. Admitted for symptomatic bradycardia requiring emergent  transvenous pacing for CHB  with  YANETH , Hyperkalemia - electrolyte disturbances, metabolic encephalopathy EP Eval for possible PPM placement.  Pt seen, Examined, case & care plan d/w  , residents at detail.  Consults:  Cardiology-Dr Ibrahim  ICU Dr Plata & team  Renal-DR Juarez  EP team-DR Thorne   Palliative care team  PT Eval when stable  NPO--> Poiet as per ICU Team  AM labs   SCD  Prognosis Guarded

## 2024-12-04 NOTE — H&P ADULT - PROBLEM SELECTOR PLAN 4
Hx of NSTEMI, LBBB 04/2024   - Continue DAPT Hx of NSTEMI, LBBB 04/2024   - As pt NPO, start ASA suppository #K 6.2   - S/P hyperkalemia protocol   - Repeat BMP per ICU    #Mg 1.5  - S/P Mag Sulfate 2mg IVPB x 1   - Repeat Mag per ICU

## 2024-12-04 NOTE — H&P ADULT - PROBLEM SELECTOR PLAN 5
Continue home atorvastatin Takes atorvastatin 80mg QHS  - Hold in setting of NPO Hx of NSTEMI, LBBB 04/2024   - As pt NPO, start ASA suppository

## 2024-12-04 NOTE — H&P ADULT - PROBLEM SELECTOR PLAN 6
Hold home lisinopril, metoprolol   - in setting of YANETH, bradycardia Takes lisinopril and metoprolol  - Hold lisinopril in setting of YANETH  - Hold metoprolol in setting of bradycardia Takes atorvastatin 80mg QHS  - Hold in setting of NPO

## 2024-12-04 NOTE — H&P ADULT - NEUROLOGICAL COMMENTS
unable to assess, minimally answering questions or following commands, b/l wrist restraints unable to assess due to AMS, minimally answering questions or following commands, b/l wrist restraints

## 2024-12-04 NOTE — H&P ADULT - PROBLEM SELECTOR PLAN 10
DVT PPx: heparin subq, hold AM prior to procedure DVT PPx: heparin subq Takes tamsulosin at home  - Hold as pt is NPO

## 2024-12-04 NOTE — ED PROVIDER NOTE - NS ED MD TWO NIGHTS YN
Condition:: FBSE Please Describe Your Condition:: Pt notes a persistent lesion of concern on the right cheek. Yes

## 2024-12-04 NOTE — ED PROVIDER NOTE - OBJECTIVE STATEMENT
89 y/o M PMH CAD, AS s/p TAVR, DM2, s/p MVR, bladder cancer s/p TURBT here complaining of 2 days of intermittent dizziness. EMS noted heart rate 27, gave atropine which helps some.  Patient denies recent illness, fever chills, chest pain, shortness of breath, palpitations, abdominal pain

## 2024-12-04 NOTE — CONSULT NOTE ADULT - SUBJECTIVE AND OBJECTIVE BOX
89 year old male with PMHx CAD, AS s/p TAVR (2017), DM2, mitral valve replacement, bladder CA s/p TURBT (2023) presents complaining of dizziness today. As per wife, patient was noted to not "feeling well" since yesterday. Was reported to feel worse today. EMS noted HR in the 20s and was given atropine x1 PTA. In the ED was found to have varying degrees of AV block in the ED with prolonged periods of high degree AVB with HR in the 30-40s. TVP placed emergently by EP in the ED. Was given glucagon x1 to reverse metoprolol effects. Also given hyperkalemia cocktail x1 and calcium IV (K+ POV 6.5). Transferred to the ICU for further management.       PAST MEDICAL & SURGICAL HISTORY:  Diabetes      HTN (hypertension)      CAD (coronary artery disease)      HLD (hyperlipidemia)      Bladder cancer      Aortic stenosis      S/P TAVR (transcatheter aortic valve replacement)      Mitral valve replaced      History of transurethral resection of bladder tumor (TURBT)        Allergies    simvastatin (Other)  Cipro (Other)  lovastatin (Other)  sulfa drugs (Other)    Intolerances      FAMILY HISTORY:  No pertinent family history in first degree relatives    Review of Systems: UTO due to poor mentation    Social History: UTO due to poor mentation      Medications:  aspirin Suppository 300 milliGRAM(s) Rectal daily  heparin   Injectable 5000 Unit(s) SubCutaneous every 12 hours  atorvastatin 80 milliGRAM(s) Oral at bedtime  dextrose 50% Injectable 25 Gram(s) IV Push once  dextrose 50% Injectable 12.5 Gram(s) IV Push once  dextrose 50% Injectable 25 Gram(s) IV Push once  dextrose Oral Gel 15 Gram(s) Oral once PRN  glucagon  Injectable 1 milliGRAM(s) IntraMuscular once  insulin lispro (ADMELOG) corrective regimen sliding scale   SubCutaneous every 6 hours  dextrose 5%. 1000 milliLiter(s) IV Continuous <Continuous>  dextrose 5%. 1000 milliLiter(s) IV Continuous <Continuous>  sodium bicarbonate  Infusion 0.329 mEq/kG/Hr IV Continuous <Continuous>  sodium chloride 0.9% lock flush 10 milliLiter(s) IV Push every 1 hour PRN  sodium chloride 0.9%. 1000 milliLiter(s) IV Continuous <Continuous>  chlorhexidine 2% Cloths 1 Application(s) Topical <User Schedule>      ICU Vital Signs Last 24 Hrs  T(C): 36.4 (04 Dec 2024 12:00), Max: 36.7 (04 Dec 2024 09:34)  T(F): 97.5 (04 Dec 2024 12:00), Max: 98 (04 Dec 2024 09:34)  HR: 79 (04 Dec 2024 13:00) (24 - 87)  BP: 93/51 (04 Dec 2024 13:00) (92/52 - 150/84)  BP(mean): 71 (04 Dec 2024 13:00) (65 - 76)  ABP: --  ABP(mean): --  RR: 28 (04 Dec 2024 13:00) (16 - 28)  SpO2: 95% (04 Dec 2024 13:00) (92% - 99%)    O2 Parameters below as of 04 Dec 2024 11:34  Patient On (Oxygen Delivery Method): nasal cannula  O2 Flow (L/min): 3      Vital Signs Last 24 Hrs  T(C): 36.4 (04 Dec 2024 12:00), Max: 36.7 (04 Dec 2024 09:34)  T(F): 97.5 (04 Dec 2024 12:00), Max: 98 (04 Dec 2024 09:34)  HR: 79 (04 Dec 2024 13:00) (24 - 87)  BP: 93/51 (04 Dec 2024 13:00) (92/52 - 150/84)  BP(mean): 71 (04 Dec 2024 13:00) (65 - 76)  RR: 28 (04 Dec 2024 13:00) (16 - 28)  SpO2: 95% (04 Dec 2024 13:00) (92% - 99%)    Parameters below as of 04 Dec 2024 11:34  Patient On (Oxygen Delivery Method): nasal cannula  O2 Flow (L/min): 3      ABG - ( 04 Dec 2024 11:10 )  pH, Arterial: 7.33  pH, Blood: x     /  pCO2: 32    /  pO2: 102   / HCO3: 17    / Base Excess: -9.0  /  SaO2: 98.9        I&O's Detail    04 Dec 2024 07:01  -  04 Dec 2024 13:28  --------------------------------------------------------  IN:    IV PiggyBack: 50 mL    Lactated Ringers Bolus: 1000 mL  Total IN: 1050 mL    OUT:    Indwelling Catheter - Urethral (mL): 50 mL  Total OUT: 50 mL    Total NET: 1000 mL      LABS:                        10.6   8.54  )-----------( 210      ( 04 Dec 2024 08:55 )             32.0     12-04    136  |  101  |  48[H]  ----------------------------<  319[H]  6.2[HH]   |  23  |  3.90[H]    Ca    9.2      04 Dec 2024 08:55  Mg     1.5     12-04    TPro  7.0  /  Alb  3.5  /  TBili  0.7  /  DBili  x   /  AST  32  /  ALT  35  /  AlkPhos  50  12-04      CAPILLARY BLOOD GLUCOSE      POCT Blood Glucose.: 271 mg/dL (04 Dec 2024 08:34)      Urinalysis Basic - ( 04 Dec 2024 08:55 )    Color: x / Appearance: x / SG: x / pH: x  Gluc: 319 mg/dL / Ketone: x  / Bili: x / Urobili: x   Blood: x / Protein: x / Nitrite: x   Leuk Esterase: x / RBC: x / WBC x   Sq Epi: x / Non Sq Epi: x / Bacteria: x        Physical Examination:    General: ill appearing.     HEENT: Pupils equal, reactive to light.  Symmetric.    PULM: Clear to auscultation bilaterally, no significant sputum production    CVS: Bradycardic    ABD: Soft, nondistended, nontender.    EXT: No edema, nontender    SKIN: Warm and well perfused, no rashes noted. 89 year old male with PMHx CAD, AS s/p TAVR (2017), DM2, mitral valve replacement, bladder CA s/p TURBT (2023) presents complaining of dizziness today. As per wife, patient was noted to not "feeling well" since yesterday. Was reported to feel worse today. EMS noted HR in the 20s and was given atropine x1 PTA. In the ED was found to have varying degrees of AV block with prolonged periods of high degree AVB with HR in the 30-40s. TVP placed emergently by EP in the ED. Was given glucagon x1 to reverse metoprolol effects. Also given hyperkalemia cocktail x1 and calcium IV (K+ POV 6.5). Transferred to the ICU for further management.       PAST MEDICAL & SURGICAL HISTORY:  Diabetes      HTN (hypertension)      CAD (coronary artery disease)      HLD (hyperlipidemia)      Bladder cancer      Aortic stenosis      S/P TAVR (transcatheter aortic valve replacement)      Mitral valve replaced      History of transurethral resection of bladder tumor (TURBT)        Allergies    simvastatin (Other)  Cipro (Other)  lovastatin (Other)  sulfa drugs (Other)    Intolerances      FAMILY HISTORY:  No pertinent family history in first degree relatives    Review of Systems: UTO due to poor mentation    Social History: UTO due to poor mentation      Medications:  aspirin Suppository 300 milliGRAM(s) Rectal daily  heparin   Injectable 5000 Unit(s) SubCutaneous every 12 hours  atorvastatin 80 milliGRAM(s) Oral at bedtime  dextrose 50% Injectable 25 Gram(s) IV Push once  dextrose 50% Injectable 12.5 Gram(s) IV Push once  dextrose 50% Injectable 25 Gram(s) IV Push once  dextrose Oral Gel 15 Gram(s) Oral once PRN  glucagon  Injectable 1 milliGRAM(s) IntraMuscular once  insulin lispro (ADMELOG) corrective regimen sliding scale   SubCutaneous every 6 hours  dextrose 5%. 1000 milliLiter(s) IV Continuous <Continuous>  dextrose 5%. 1000 milliLiter(s) IV Continuous <Continuous>  sodium bicarbonate  Infusion 0.329 mEq/kG/Hr IV Continuous <Continuous>  sodium chloride 0.9% lock flush 10 milliLiter(s) IV Push every 1 hour PRN  sodium chloride 0.9%. 1000 milliLiter(s) IV Continuous <Continuous>  chlorhexidine 2% Cloths 1 Application(s) Topical <User Schedule>      ICU Vital Signs Last 24 Hrs  T(C): 36.4 (04 Dec 2024 12:00), Max: 36.7 (04 Dec 2024 09:34)  T(F): 97.5 (04 Dec 2024 12:00), Max: 98 (04 Dec 2024 09:34)  HR: 79 (04 Dec 2024 13:00) (24 - 87)  BP: 93/51 (04 Dec 2024 13:00) (92/52 - 150/84)  BP(mean): 71 (04 Dec 2024 13:00) (65 - 76)  ABP: --  ABP(mean): --  RR: 28 (04 Dec 2024 13:00) (16 - 28)  SpO2: 95% (04 Dec 2024 13:00) (92% - 99%)    O2 Parameters below as of 04 Dec 2024 11:34  Patient On (Oxygen Delivery Method): nasal cannula  O2 Flow (L/min): 3      Vital Signs Last 24 Hrs  T(C): 36.4 (04 Dec 2024 12:00), Max: 36.7 (04 Dec 2024 09:34)  T(F): 97.5 (04 Dec 2024 12:00), Max: 98 (04 Dec 2024 09:34)  HR: 79 (04 Dec 2024 13:00) (24 - 87)  BP: 93/51 (04 Dec 2024 13:00) (92/52 - 150/84)  BP(mean): 71 (04 Dec 2024 13:00) (65 - 76)  RR: 28 (04 Dec 2024 13:00) (16 - 28)  SpO2: 95% (04 Dec 2024 13:00) (92% - 99%)    Parameters below as of 04 Dec 2024 11:34  Patient On (Oxygen Delivery Method): nasal cannula  O2 Flow (L/min): 3      ABG - ( 04 Dec 2024 11:10 )  pH, Arterial: 7.33  pH, Blood: x     /  pCO2: 32    /  pO2: 102   / HCO3: 17    / Base Excess: -9.0  /  SaO2: 98.9        I&O's Detail    04 Dec 2024 07:01  -  04 Dec 2024 13:28  --------------------------------------------------------  IN:    IV PiggyBack: 50 mL    Lactated Ringers Bolus: 1000 mL  Total IN: 1050 mL    OUT:    Indwelling Catheter - Urethral (mL): 50 mL  Total OUT: 50 mL    Total NET: 1000 mL      LABS:                        10.6   8.54  )-----------( 210      ( 04 Dec 2024 08:55 )             32.0     12-04    136  |  101  |  48[H]  ----------------------------<  319[H]  6.2[HH]   |  23  |  3.90[H]    Ca    9.2      04 Dec 2024 08:55  Mg     1.5     12-04    TPro  7.0  /  Alb  3.5  /  TBili  0.7  /  DBili  x   /  AST  32  /  ALT  35  /  AlkPhos  50  12-04      CAPILLARY BLOOD GLUCOSE      POCT Blood Glucose.: 271 mg/dL (04 Dec 2024 08:34)      Urinalysis Basic - ( 04 Dec 2024 08:55 )    Color: x / Appearance: x / SG: x / pH: x  Gluc: 319 mg/dL / Ketone: x  / Bili: x / Urobili: x   Blood: x / Protein: x / Nitrite: x   Leuk Esterase: x / RBC: x / WBC x   Sq Epi: x / Non Sq Epi: x / Bacteria: x        Physical Examination:    General: ill appearing.     HEENT: Pupils equal, reactive to light.  Symmetric.    PULM: Clear to auscultation bilaterally, no significant sputum production    CVS: Bradycardic    ABD: Soft, nondistended, nontender.    EXT: No edema, nontender    SKIN: Warm and well perfused, no rashes noted. 89 year old male with PMHx CAD, AS s/p TAVR (2017), DM2, mitral valve replacement, bladder CA s/p TURBT (2023) presents complaining of dizziness today. As per wife, patient was noted to not "feeling well" since yesterday. Was reported to feel worse today. EMS noted HR in the 20s and was given atropine x1 PTA. In the ED was found to have varying degrees of AV block with prolonged periods of high degree AVB with HR in the 30-40s. TVP placed emergently by EP in the ED. Was given glucagon x1 to reverse metoprolol effects. Also given hyperkalemia cocktail x1 and calcium IV (K+ POA 6.5). Transferred to the ICU for further management.       PAST MEDICAL & SURGICAL HISTORY:  Diabetes      HTN (hypertension)      CAD (coronary artery disease)      HLD (hyperlipidemia)      Bladder cancer      Aortic stenosis      S/P TAVR (transcatheter aortic valve replacement)      Mitral valve replaced      History of transurethral resection of bladder tumor (TURBT)        Allergies    simvastatin (Other)  Cipro (Other)  lovastatin (Other)  sulfa drugs (Other)    Intolerances      FAMILY HISTORY:  No pertinent family history in first degree relatives    Review of Systems: UTO due to poor mentation    Social History: UTO due to poor mentation      Medications:  aspirin Suppository 300 milliGRAM(s) Rectal daily  heparin   Injectable 5000 Unit(s) SubCutaneous every 12 hours  atorvastatin 80 milliGRAM(s) Oral at bedtime  dextrose 50% Injectable 25 Gram(s) IV Push once  dextrose 50% Injectable 12.5 Gram(s) IV Push once  dextrose 50% Injectable 25 Gram(s) IV Push once  dextrose Oral Gel 15 Gram(s) Oral once PRN  glucagon  Injectable 1 milliGRAM(s) IntraMuscular once  insulin lispro (ADMELOG) corrective regimen sliding scale   SubCutaneous every 6 hours  dextrose 5%. 1000 milliLiter(s) IV Continuous <Continuous>  dextrose 5%. 1000 milliLiter(s) IV Continuous <Continuous>  sodium bicarbonate  Infusion 0.329 mEq/kG/Hr IV Continuous <Continuous>  sodium chloride 0.9% lock flush 10 milliLiter(s) IV Push every 1 hour PRN  sodium chloride 0.9%. 1000 milliLiter(s) IV Continuous <Continuous>  chlorhexidine 2% Cloths 1 Application(s) Topical <User Schedule>      ICU Vital Signs Last 24 Hrs  T(C): 36.4 (04 Dec 2024 12:00), Max: 36.7 (04 Dec 2024 09:34)  T(F): 97.5 (04 Dec 2024 12:00), Max: 98 (04 Dec 2024 09:34)  HR: 79 (04 Dec 2024 13:00) (24 - 87)  BP: 93/51 (04 Dec 2024 13:00) (92/52 - 150/84)  BP(mean): 71 (04 Dec 2024 13:00) (65 - 76)  ABP: --  ABP(mean): --  RR: 28 (04 Dec 2024 13:00) (16 - 28)  SpO2: 95% (04 Dec 2024 13:00) (92% - 99%)    O2 Parameters below as of 04 Dec 2024 11:34  Patient On (Oxygen Delivery Method): nasal cannula  O2 Flow (L/min): 3      Vital Signs Last 24 Hrs  T(C): 36.4 (04 Dec 2024 12:00), Max: 36.7 (04 Dec 2024 09:34)  T(F): 97.5 (04 Dec 2024 12:00), Max: 98 (04 Dec 2024 09:34)  HR: 79 (04 Dec 2024 13:00) (24 - 87)  BP: 93/51 (04 Dec 2024 13:00) (92/52 - 150/84)  BP(mean): 71 (04 Dec 2024 13:00) (65 - 76)  RR: 28 (04 Dec 2024 13:00) (16 - 28)  SpO2: 95% (04 Dec 2024 13:00) (92% - 99%)    Parameters below as of 04 Dec 2024 11:34  Patient On (Oxygen Delivery Method): nasal cannula  O2 Flow (L/min): 3      ABG - ( 04 Dec 2024 11:10 )  pH, Arterial: 7.33  pH, Blood: x     /  pCO2: 32    /  pO2: 102   / HCO3: 17    / Base Excess: -9.0  /  SaO2: 98.9        I&O's Detail    04 Dec 2024 07:01  -  04 Dec 2024 13:28  --------------------------------------------------------  IN:    IV PiggyBack: 50 mL    Lactated Ringers Bolus: 1000 mL  Total IN: 1050 mL    OUT:    Indwelling Catheter - Urethral (mL): 50 mL  Total OUT: 50 mL    Total NET: 1000 mL      LABS:                        10.6   8.54  )-----------( 210      ( 04 Dec 2024 08:55 )             32.0     12-04    136  |  101  |  48[H]  ----------------------------<  319[H]  6.2[HH]   |  23  |  3.90[H]    Ca    9.2      04 Dec 2024 08:55  Mg     1.5     12-04    TPro  7.0  /  Alb  3.5  /  TBili  0.7  /  DBili  x   /  AST  32  /  ALT  35  /  AlkPhos  50  12-04      CAPILLARY BLOOD GLUCOSE      POCT Blood Glucose.: 271 mg/dL (04 Dec 2024 08:34)      Urinalysis Basic - ( 04 Dec 2024 08:55 )    Color: x / Appearance: x / SG: x / pH: x  Gluc: 319 mg/dL / Ketone: x  / Bili: x / Urobili: x   Blood: x / Protein: x / Nitrite: x   Leuk Esterase: x / RBC: x / WBC x   Sq Epi: x / Non Sq Epi: x / Bacteria: x        Physical Examination:    General: ill appearing.     HEENT: Pupils equal, reactive to light.  Symmetric.    PULM: Clear to auscultation bilaterally, no significant sputum production    CVS: Bradycardic    ABD: Soft, nondistended, nontender.    EXT: No edema, nontender    SKIN: Warm and well perfused, no rashes noted.

## 2024-12-04 NOTE — H&P ADULT - NSICDXPASTMEDICALHX_GEN_ALL_CORE_FT
PAST MEDICAL HISTORY:  Aortic stenosis     Bladder cancer     CAD (coronary artery disease)     Diabetes     HLD (hyperlipidemia)     HTN (hypertension)      PAST MEDICAL HISTORY:  Anemia of chronic disease     Aortic stenosis     Bladder cancer     CAD (coronary artery disease)     Diabetes     HLD (hyperlipidemia)     HTN (hypertension)

## 2024-12-04 NOTE — ED PROVIDER NOTE - PATIENT'S PREFERRED PRONOUN
Render Post-Care Instructions In Note?: no Post-Care Instructions: I reviewed with the patient in detail post-care instructions. Patient is to wear sunprotection, and avoid picking at any of the treated lesions. Pt may apply Vaseline to crusted or scabbing areas. Consent: The patient's consent was obtained including but not limited to risks of crusting, scabbing, blistering, scarring, darker or lighter pigmentary change, recurrence, incomplete removal and infection. Detail Level: Detailed Duration Of Freeze Thaw-Cycle (Seconds): 0 Him/He

## 2024-12-04 NOTE — CONSULT NOTE ADULT - ATTENDING COMMENTS
89yo M with a PMH of HTN, CAD, HLD, DM2, mitral valve replacement, bladder cancer s/p TURBT (2023), aortic stenosis s/p TAVR (2017), ulcerative colitis being admitted to ICU for complete heart block, YANETH, hyperkalemia.    - Varying degrees of AV block in the ED with prolonged periods of high degree AVB requiring emergent tvP  - TVP placed in EP team with myself assisting.  Initial threshold noted to be 5.   Output placed at 15  - Hold BB.  IV glucagon given to reverse BB  - Initial K 6.5.  Given Calcium, insulin, D50  - Follow up renal  - Monitor rhythm closely for recovery  - Likely to need PPM  - EP consulted  - Monitor respiratory status and mental status closely  - Trop 91, likely elevated in setting of demand ischemia, YANETH and significant electrolyte abnormalities --> trend to peak   - TTE (4/2024): LV appears grossly normal, LA mildly dilated, hx of TAVR and MV replacement, mild MR   - F/u repeat TTE   - Hold home Metoprolol in setting of bradycardia, Hold home Lisinopril in setting of YANETH    - At risk for abrupt decompesation

## 2024-12-04 NOTE — ED ADULT NURSE REASSESSMENT NOTE - NS ED NURSE REASSESS COMMENT FT1
pt aox3, severe bradycardia, no sob, no chest pain, evaluated by Dr Bowman, temporary P Maker done by ICU NP, consent on chart, gotten from wife

## 2024-12-04 NOTE — H&P ADULT - GASTROINTESTINAL
normal/soft/nontender/nondistended/normal active bowel sounds not applicable normal/soft/nontender/nondistended/normal active bowel sounds/no guarding/no rigidity/no organomegaly/no palpable jessica/no masses palpable

## 2024-12-04 NOTE — ED PROVIDER NOTE - CARE PLAN
1 Principal Discharge DX:	Bradycardia   Principal Discharge DX:	Bradycardia  Secondary Diagnosis:	Hyperkalemia

## 2024-12-04 NOTE — H&P ADULT - PROBLEM SELECTOR PLAN 1
Patient with symptomatic bradycardia on presentation, s/p atropine with some improvement  - EKG Marked sinus bradycardia HR 34 Left axis deviation Left bundle branch block --> varying degrees of AV block with prolonged periods of high degree AVB   - S/P BB reversal with Glucagon   - Started on TVP with interventional cards  - Avoid AV chase agents; hold home BB   - Plan for PPM placement by interventional tomorrow 12/5  - TTE (4/2024): LV appears grossly normal, LA mildly dilated, hx of TAVR and MV replacement, mild MR   - Obtain TTE   - Cardiology consulted (Dr. Ibrahim), recs appreciated  - Interventional consulted   - Plan per ICU Patient with symptomatic bradycardia on presentation, s/p atropine with some improvement  - EKG Marked sinus bradycardia HR 34 Left axis deviation Left bundle branch block --> varying degrees of AV block with prolonged periods of high degree AVB   - S/P BB reversal with Glucagon   - Started on TVP with interventional cards; 20/80   - Suspicion for BRASH syndrome due to bradycardia with associated abnormal renal findings --> pt on multiple nephrotoxic medications, including metformin, lisinopril, torsemide; leading to retention of BB   - Hold all AV chase agents; hold home BB   - Plan for PPM placement by interventional tomorrow 12/5  - TTE (4/2024): LV appears grossly normal, LA mildly dilated, hx of TAVR and MV replacement, mild MR   - Obtain TTE   - Cardiology consulted (Dr. Ibrahim), recs appreciated  - Interventional consulted, recs appreciated   - Plan per ICU Patient with symptomatic bradycardia on presentation, s/p atropine with some improvement  - EKG Marked sinus bradycardia HR 34 Left axis deviation Left bundle branch block --> varying degrees of AV block with prolonged periods of high degree AVB   - S/P BB reversal with Glucagon   - Started on TVP with interventional cards; 20/80   - Suspicion for BRASH syndrome due to bradycardia with associated abnormal renal findings --> pt on multiple nephrotoxic medications, including metformin, lisinopril, torsemide  - Hold all AV chase agents; hold home BB   - Plan for PPM placement by interventional tomorrow 12/5  - TTE (4/2024): LV appears grossly normal, LA mildly dilated, hx of TAVR and MV replacement, mild MR   - Obtain TTE   - Cardiology consulted (Dr. Ibrahim), recs appreciated  - Interventional consulted, recs appreciated   - Plan per ICU Patient with symptomatic bradycardia on presentation, s/p atropine with some improvement  - EKG Marked sinus bradycardia HR 34 Left axis deviation Left bundle branch block --> varying degrees of AV block with prolonged periods of high degree AVB   - S/P BB reversal with Glucagon   - Started on TVP with interventional cards; 20/80   - Suspicion for BRASH syndrome due to bradycardia with associated abnormal renal findings --> pt on multiple nephrotoxic medications, including metformin, lisinopril, torsemide  - Hold all AV chase agents; hold home BB   - Plan for PPM placement by interventional tomorrow 12/5  - TTE (4/2024): LV appears grossly normal, LA mildly dilated, hx of TAVR and MV replacement, mild MR   - Obtain TTE   - Trop elevation likely 2/2 demand ischemia, YANETH, electrolyte disturbances; trending to peak   - Obtain thyroid studies   - Cardiology consulted (Dr. Ibrahim), recs appreciated  - Interventional consulted, recs appreciated   - Plan per ICU Patient with symptomatic bradycardia on presentation, s/p atropine with some improvement  - EKG Marked sinus bradycardia HR 34 Left axis deviation Left bundle branch block --> varying degrees of AV block with prolonged periods of high degree AVB   - S/P BB reversal with Glucagon   - Started on TVP with interventional cards; 20/80   - Suspicion for BRASH syndrome due to bradycardia with associated abnormal renal findings --> pt on multiple nephrotoxic medications, including metformin, lisinopril, torsemide  - Hold all AV chase agents; hold home BB   - Plan for PPM placement by interventional tomorrow 12/5  - TTE (4/2024): LV appears grossly normal, LA mildly dilated, hx of TAVR and MV replacement, mild MR   - Obtain TTE   - Trop elevation likely 2/2 demand ischemia, YANETH, electrolyte disturbances; trending to peak   - Obtain thyroid studies   - Cardiology consulted (Dr. Ibrahim), recs appreciated  - Interventional cards consulted, recs appreciated   - Plan per ICU Patient with symptomatic bradycardia on presentation, s/p atropine with some improvement for possible BB over dose   - EKG Marked sinus bradycardia HR 34 Left axis deviation Left bundle branch block --> varying degrees of  AV block with prolonged periods of high degree AVB   - S/P BB reversal with Glucagon.  - Started on TVP with interventional cards; 20/80   - Suspicion for BRASH syndrome due to bradycardia with associated abnormal renal findings --> pt on multiple nephrotoxic medications, including metformin, lisinopril, torsemide  - Hold all AV chase agents; hold home BB   -  EP Eval- Plan for PPM placement if stable.  - TTE (4/2024): LV appears grossly normal, LA mildly dilated, hx of TAVR and MV replacement, mild MR   - Obtain TTE   - Trop elevation likely 2/2 demand ischemia, YANETH, electrolyte disturbances; trending to peak   - Obtain thyroid studies   - Cardiology consulted (Dr. Ibrahim), recs appreciated  - Interventional cards consulted, recs appreciated   - Plan per ICU

## 2024-12-04 NOTE — CONSULT NOTE ADULT - NS ATTEND AMEND GEN_ALL_CORE FT
89M with CAD, AS s/p TAVR, mitral valve replacement T2DM, bladder cancer s/p TURBT who presents with CHB s/p TVP likely 2/2 YANETH, hyperkalemia and metabolic acidosis.    Plan d/w cardiology, continue TVP for now, correct metabolic abnormalities and reevaluate underlying rhythm. Hold AV chase blocking agents.  Pt may require PPM placement    plan d/w family at bedside. Pt is DNR/DNI. Would not want HD.

## 2024-12-04 NOTE — H&P ADULT - PROBLEM SELECTOR PLAN 8
Takes meformin, glipizide, sitagliptin at home  - Hold home diabetic medications  - Start insulin sliding scale  - NPO, FS Q6H, hypoglycemia protocol Takes lisinopril and metoprolol-HOLD BB,CCB   - Hold lisinopril in setting of YANETH  - Hold metoprolol in setting of bradycardia

## 2024-12-04 NOTE — H&P ADULT - PROBLEM SELECTOR PLAN 7
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

## 2024-12-04 NOTE — H&P ADULT - PROBLEM SELECTOR PLAN 2
BUN/Cr 48/3.9   - Baseline Cr ~1.5   - Initially etiology suspected 2/2 prerenal azotemia alone, per nephro   - However, pt also retaining on bladder scan; obstructive uropathy   - Indwelling negron placed per D BUN/Cr 48/3.9   - Baseline Cr ~1.5   - Initially etiology suspected 2/2 prerenal azotemia alone, per nephro   - However, pt also retaining on bladder scan; obstructive uropathy   - Indwelling negron placed in ICU with clear, yellow urine output  - Start sodium bicarb gtt BUN/Cr 48/3.9   - Baseline Cr ~1.5   - Initially etiology suspected 2/2 prerenal azotemia alone, per nephro   - However, pt also retaining on bladder scan; obstructive uropathy   - Indwelling negron placed in ICU with clear, yellow urine output  - Start sodium bicarb gtt  - Avoid nephrotoxic medications  - Nephro Dr. Juarez consulted, recs appreciated BUN/Cr 48/3.9   - Baseline Cr ~1.5   - Suspect 2/2 various etiology, including dehydration, obstruction, and medication induced.   - Indwelling negron placed in due to significant retention; clear, yellow urine output  - Start sodium bicarb gtt  - F/U urine electrolyte studies   - F/U UA, UCx   - Avoid nephrotoxic medications  - Nephro Dr. Juarez consulted, recs appreciated BUN/Cr 48/3.9 -pre renal   - Baseline Cr ~1.5   - Suspect 2/2 various etiology, including dehydration, obstruction, and medication induced.   - Indwelling negron placed in due to significant retention; clear, yellow urine output  - Start sodium bicarb gtt as per Renal   - F/U urine electrolyte studies   - F/U UA, UCx   - Avoid nephrotoxic medications  - Nephro Dr. Juarez consulted, recs appreciated-Repeat BMP today & AM

## 2024-12-04 NOTE — PROCEDURE NOTE - NSPOSTPRCRAD_GEN_A_CORE
central line located in the superior vena cava/post-procedure radiography performed
chest radiograph/no pneumothorax/pacing catheter located in the right ventricle

## 2024-12-04 NOTE — ED PROVIDER NOTE - PHYSICAL EXAMINATION
Vital signs as available reviewed.  General:  No acute distress.  Head:  Normocephalic, atraumatic.  Eyes:  Conjunctiva pink, no icterus.  Cardiovascular:  bradycardia.  Respiratory:  Clear to auscultation, good air entry bilaterally.  Abdomen:  Soft, non-tender.  Musculoskeletal:  No obvious deformity.  Neurologic: Alert, hard of hearing, moving all extremities.  Skin:  Warm and dry.

## 2024-12-04 NOTE — H&P ADULT - CARDIOVASCULAR
not applicable Right IJ/irregular rate and rhythm/bradycardia irregular rate and rhythm/bradycardia/peripheral edema S1 S2 present/no rub/no JVD/irregular rate and rhythm/bradycardia/peripheral edema/pedal edema/vascular

## 2024-12-04 NOTE — CONSULT NOTE ADULT - PA/NP COMMENTS
Critical care time spent: 140 nonconsecutive minutes including evaluation of presenting problem(s) and associated risks of acute illness that poses high probability of life threatening deterioration and/or end organ damage/dysfunction, reviewing medical record, face-to-face encounter with patient to conduct physical examination and obtain additional history as able. Appropriate diagnostic studies ordered, reviewed, and interpreted as needed. Recommendations and/or interventions made as documented. I have coordinated care with the multidisciplinary team including bedside RN, attending, and consultants as needed. Patient and family education and counseling provided as needed and as appropriate. This excludes any time spent on separate procedures or teaching. Date of entry of this note is equal to date of services rendered.

## 2024-12-04 NOTE — CONSULT NOTE ADULT - ASSESSMENT
89 year old male with PMHx CAD, AS s/p TAVR (2017), DM2, mitral valve replacement, bladder CA s/p TURBT (2023) presents complaining of dizziness today. As per wife, patient was noted to not "feeling well" since yesterday. Was noted to feel worse today. EMS noted HR in the 20s and was given atropine x1 PTA. In the ED was found to have varying degrees of AV block in the ED with prolonged periods of high degree AVB with HR in the 30-40s. TVP placed emergently by EP in the ED. Transferred to the ICU for further management.     Symptomatic bradycardia  YANETH  Hyperkalemia   Hypomagnesemia  Acute hypercapnic respiratory failure   AMS   89 year old male with PMHx CAD, AS s/p TAVR (2017), mitral valve replacement, DM2, bladder CA s/p TURBT (2023) admitted to the ICU for CHB requiring emergent TVP. Also found to have YANETH and hyperkalemia. Hyperkalemia cocktail given in the ED. Glucagon given to reverse effects of home Metoprolol.    Symptomatic bradycardia  YANETH  Hyperkalemia   Hypomagnesemia  Acute hypercapnic respiratory failure   AMS    Neuro: Poor mentating likely secondary to metabolic derangements. Continue to monitor closely.    CV: s/p TVP. IV glucagon given in the ED to reverse metoprolol effects. Will hold on metoprolol in setting of AV block and lisinopril in the setting of YANETH. Troponin 91, likely demand ischemia. Will continue to trend. Follow up formal TTE. EP and cardiology following.    Resp: On NC with SpO2 in the 90s. ABG with respiratory acidosis in the setting of CHB (pH 7.33, CO2 32). Follow up on repeat ABG. Maintain SpO2>90%.     GI: NPO for now.     Renal: K+ present on admission: 6.5. Calcium, insulin, and D50 given in the ED. Will repeat metabolic panel. Given 500cc NS bolus in the ED. Will order another 1L LR bolus and start on sodium bicarb gtt @150cc/hr. Indwelling catheter placed with immediate output of 100 cc's. Magnesium repleted. Will hold on lisionpril and torsemide in the setting of YANETH. YANETH, likely ATN. Baseline Cr 1.5. Will continue to trend renal function. Strict I's and O's. Maintain Mg>2, Phos>3, K>4. Follow up UA and urine lytes. Nephrology following.     ID: Monitor WBC and temps.     Heme: H+H stable. Heparin for DVT ppx. ASA daily.     Endo: History of DM2. On ISS. FS q6h.      Case discussed with ICU attending, Dr. Plata. 89 year old male with PMHx CAD, AS s/p TAVR (2017), mitral valve replacement, DM2, bladder CA s/p TURBT (2023) admitted to the ICU for CHB requiring emergent TVP. Also found to have YANETH and hyperkalemia. Hyperkalemia cocktail given in the ED. Glucagon given to reverse effects of home Metoprolol.    Symptomatic bradycardia  YANETH  Hyperkalemia   Hypomagnesemia  Acute hypercapnic respiratory failure   AMS    Neuro: Poor mentating likely secondary to metabolic derangements. Continue to monitor closely.    CV: s/p TVP. IV glucagon given in the ED to reverse metoprolol effects. Will hold on metoprolol in setting of AV block and lisinopril in the setting of YANETH. Troponin 91, likely demand ischemia. Will continue to trend. Follow up formal TTE. EP and cardiology following.    Resp: On NC with SpO2 in the 90s. ABG with respiratory acidosis in the setting of CHB (pH 7.33, CO2 32). Follow up on repeat ABG. Maintain SpO2>90%.     GI: NPO for now.     Renal: K+ present on admission: 6.5. Calcium, insulin, and D50 given in the ED. Will repeat metabolic panel. Given 500cc NS bolus in the ED. Will order another 1L LR bolus and start on sodium bicarb gtt @150cc/hr. Indwelling catheter placed with immediate output of 100 cc's. Magnesium repleted. Will hold on lisionpril and torsemide in the setting of YANETH. YANETH, likely ATN. Baseline Cr 1.5. Will continue to trend renal function. Strict I's and O's. Maintain Mg>2, Phos>3, K>4. Follow up UA and urine lytes. Nephrology following.     ID: Monitor WBC and temps.     Heme: H+H stable. Heparin for DVT ppx. ASA daily.     Endo: History of DM2. On ISS. FS q6h.      Ethics: Patient is DNR/DNI. Doesn't wish to pursue dialysis at this time. Confirmed patient's wishes with healthcare proxy (wife, Jenn Archibald) at bedside. OKSANA in chart.     Case discussed with ICU attending, Dr. Plata. 89 year old male with PMHx CAD, AS s/p TAVR (2017), mitral valve replacement, DM2, bladder CA s/p TURBT (2023) admitted to the ICU for CHB requiring emergent TVP. Also found to have YANETH and hyperkalemia. Hyperkalemia cocktail given in the ED. Glucagon given to reverse effects of home Metoprolol.    Symptomatic bradycardia  YANETH  Hyperkalemia   Hypomagnesemia  Acute hypercapnic respiratory failure   AMS    Neuro: Poor mentating likely secondary to metabolic derangements. Continue to monitor closely.    CV: s/p TVP. IV glucagon given in the ED to reverse metoprolol effects. Will hold on metoprolol in setting of AV block and lisinopril in the setting of YANETH. Troponin 91, likely demand ischemia. Will continue to trend. Follow up formal TTE. EP and cardiology following.    Resp: On NC with SpO2 in the 90s. ABG with respiratory acidosis in the setting of CHB (pH 7.33, CO2 32). Follow up on repeat ABG. Maintain SpO2>90%.     GI: NPO for now.     Renal: K+ present on admission: 6.5. Calcium, insulin, and D50 given in the ED. Will repeat metabolic panel. Given 500cc NS bolus in the ED. Will order another 1L LR bolus and start on sodium bicarb gtt @150cc/hr. Indwelling catheter placed with immediate output of 100 cc's. Magnesium repleted. Will hold on lisionpril and torsemide in the setting of YANETH. YANETH, likely ATN. Baseline Cr 1.5. Will continue to trend renal function. Strict I's and O's. Maintain Mg>2, Phos>3, K>4. Follow up UA and urine lytes. Nephrology following.     ID: Monitor WBC and temps.     Heme: Trend H+H. Heparin for DVT ppx. ASA daily.     Endo: History of DM2. On ISS. FS q6h.      Ethics: Patient is DNR/DNI. Doesn't wish to pursue dialysis at this time. Confirmed patient's wishes with healthcare proxy (wife, Jenn Archibald) at bedside. OKSANA in chart.     Case discussed with ICU attending, Dr. Plata. 89 year old male with PMHx CAD, AS s/p TAVR (2017), mitral valve replacement, DM2, bladder CA s/p TURBT (2023) admitted to the ICU for CHB requiring emergent TVP. Also found to have YANETH and hyperkalemia. Hyperkalemia cocktail given in the ED. Glucagon given to reverse effects of home Metoprolol.    Symptomatic bradycardia  YANETH  Hyperkalemia   Hypomagnesemia  Acute hypercapnic respiratory failure   AMS    Neuro: Poor mentation likely secondary to metabolic derangements. Continue to monitor closely.    CV: s/p TVP. IV glucagon given in the ED to reverse metoprolol effects. Will hold on metoprolol in setting of AV block and lisinopril in the setting of YANETH. Troponin 91, likely demand ischemia. Will continue to trend. Follow up formal TTE. EP and cardiology following.    Resp: On NC with SpO2 in the 90s. ABG with respiratory acidosis in the setting of CHB (pH 7.33, CO2 32). Follow up on repeat ABG. Maintain SpO2>90%.     GI: NPO for now.     Renal: K+ present on admission: 6.5. Calcium, insulin, and D50 given in the ED. Will repeat metabolic panel. Given 500cc NS bolus in the ED. Will order another 1L LR bolus and start on sodium bicarb gtt @150cc/hr. Indwelling catheter placed with immediate output of 100 cc's. Magnesium repleted. Will hold on lisionpril and torsemide in the setting of YANETH. YANETH, likely ATN. Baseline Cr 1.5. Will continue to trend renal function. Strict I's and O's. Maintain Mg>2, Phos>3, K>4. Follow up UA and urine lytes. Nephrology following.     ID: Monitor WBC and temps.     Heme: Trend H+H. Heparin for DVT ppx. ASA daily.     Endo: History of DM2. On ISS. FS q6h.      Ethics: Patient is DNR/DNI. Doesn't wish to pursue dialysis at this time. Confirmed patient's wishes with healthcare proxy (wife, Jenn Archibald) at bedside. OKSANA in chart.     Case discussed with ICU attending, Dr. Plata.

## 2024-12-04 NOTE — ED ADULT NURSE NOTE - NSFALLRISKINTERV_ED_ALL_ED

## 2024-12-04 NOTE — PATIENT PROFILE ADULT - FUNCTIONAL ASSESSMENT - BASIC MOBILITY 6.
2-calculated by average/Not able to assess (calculate score using [facility] averaging method)

## 2024-12-04 NOTE — H&P ADULT - COMMENTS
Unable to obtain due to dementia, severity of illness Unable to obtain due to AMS, severity of illness

## 2024-12-04 NOTE — PATIENT PROFILE ADULT - FALL HARM RISK - HARM RISK INTERVENTIONS

## 2024-12-04 NOTE — ED ADULT TRIAGE NOTE - CHIEF COMPLAINT QUOTE
Patient is from for dizziness. HR was 20's as per EMT. Atropin 1 mg IVP given by EMT. HR went up to 40's.

## 2024-12-04 NOTE — H&P ADULT - HISTORY OF PRESENT ILLNESS
89 yo M with PMH HTN, CAD, HLD, DM2, mitral valve replacement, bladder cancer s/p TURBT (2023), aortic stenosis s/p TAVR (2017), ulcerative colitis presents to the ED with 2 days of dizziness.     Denies fever, chills, chest pain, palpitations, SOB, cough, abdominal pain, nausea, vomiting, diarrhea, constipation, urinary frequency, urgency, or dysuria, headaches, changes in vision, dizziness, numbness, tingling.  Denies recent travel, recent antibiotic use, or sick contacts.    ED Course:   Vitals: BP: 108/60, HR: 42 -> 30 -> 42 -> 24 -> 78, Temp: 97F, RR: 16, SpO2: 92% on RA --> 96% 3L NC    Labs:  K 6.2, BUN/Cr 48/3.9, Glucose 319, Hgb 10.6, Trop 91 proBNP 8310, Mag 1.5   ABG: pH: 7.33, PO2: 102, PCO2: 32, HCO3: 17, SpO2: 98%  UA: N/A   CXR: Heart magnified by technique. TAVR  aortic valve again noted.Slightly increased scattered interstitial markings the lungs possibly chronic are unchanged. 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 Chest is similar to April 17 this year. Follow-up AP chest on December 4, 2024 at 11:14 AM. A temporary pacer wire has been inserted from right jugular approach with tip in the right atrioventricular area. IMPRESSION: Temporary pacer wire inserted.  CT: N/A   EKG: Marked sinus bradycardia HR 34 Left axis deviation Left bundle branch block  Received in the ED:    89 yo M with PMH HTN, CAD, HLD, DM2, mitral valve replacement, bladder cancer s/p TURBT (2023), aortic stenosis s/p TAVR (2017), ulcerative colitis presents to the ED with 2 days of dizziness.     Denies fever, chills, chest pain, palpitations, SOB, cough, abdominal pain, nausea, vomiting, diarrhea, constipation, urinary frequency, urgency, or dysuria, headaches, changes in vision, dizziness, numbness, tingling.  Denies recent travel, recent antibiotic use, or sick contacts.    ED Course:   Vitals: BP: 108/60, HR: 42 -> 30 -> 42 -> 24 -> 78, Temp: 97F, RR: 16, SpO2: 92% on RA --> 96% 3L NC    Labs:  K 6.2, BUN/Cr 48/3.9, Glucose 319, Hgb 10.6, Trop 91 proBNP 8310, Mag 1.5   ABG: pH: 7.33, PO2: 102, PCO2: 32, HCO3: 17, SpO2: 98%  UA: N/A   CXR: Heart magnified by technique. TAVR  aortic valve again noted.Slightly increased scattered interstitial markings the lungs possibly chronic are unchanged. 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 Chest is similar to April 17 this year. Follow-up AP chest on December 4, 2024 at 11:14 AM. A temporary pacer wire has been inserted from right jugular approach with tip in the right atrioventricular area. IMPRESSION: Temporary pacer wire inserted.  CT: N/A   EKG: Marked sinus bradycardia HR 34 Left axis deviation Left bundle branch block  Received in the ED: Atropine 1mg x 1, Calcium Chloride 500mg IVP x 1, Humalin 10u IVP x 1, Mag Sulfate 2g x 1, Lokelma 10mg x 1, Dextrose 50% 50cc x 1, NS 500cc bolus x 1, Albuterol neb    89 yo M with PMH HTN, CAD, HLD, DM2, mitral valve replacement, bladder cancer s/p TURBT (2023), aortic stenosis s/p TAVR (2017), ulcerative colitis presents to the ED with 2 days of dizziness. Patient is unable to provide history.       ED Course:   Vitals: BP: 108/60, HR: 42 -> 30 -> 42 -> 24 -> 78, Temp: 97F, RR: 16, SpO2: 92% on RA --> 96% 3L NC    Labs:  K 6.2, BUN/Cr 48/3.9, Glucose 319, Hgb 10.6, Trop 91 proBNP 8310, Mag 1.5   ABG: pH: 7.33, PO2: 102, PCO2: 32, HCO3: 17, SpO2: 98%  UA: N/A   CXR: Heart magnified by technique. TAVR  aortic valve again noted.Slightly increased scattered interstitial markings the lungs possibly chronic are unchanged. 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 Chest is similar to April 17 this year. Follow-up AP chest on December 4, 2024 at 11:14 AM. A temporary pacer wire has been inserted from right jugular approach with tip in the right atrioventricular area. IMPRESSION: Temporary pacer wire inserted.  CT: N/A   EKG: Marked sinus bradycardia HR 34 Left axis deviation Left bundle branch block  Received in the ED: Atropine 1mg x 1, Calcium Chloride 500mg IVP x 1, Humalin 10u IVP x 1, Mag Sulfate 2g x 1, Lokelma 10mg x 1, Dextrose 50% 50cc x 1, NS 500cc bolus x 1, Albuterol neb    89 yo M with PMH HTN, CAD, HLD, DM2, mitral valve replacement, bladder cancer s/p TURBT (2023), aortic stenosis s/p TAVR (2017), ulcerative colitis presents to the ED with 2 days of dizziness. Patient is unable to provide history, history was mainly obtained from wife. Per wife, pt had generalized malaise for the past three days. No specific trigger for symptoms. Pt had associated dizziness, unsteadiness with ambulation, along with diaphoresis. Family denies changes in urinary frequency, at baseline has increased frequency due to history of bladder cancer. Pt did have some constipation for a few days, which resolved with a BM last night per wife.   Pt had seen his cardiologist at the end of November, Dr. Jacob, where the medication for his LE edema was changed from Lasix to Torsemide. Has had no issues with this medication so far.     ED Course:   Vitals: BP: 108/60, HR: 42 -> 30 -> 42 -> 24 -> 78, Temp: 97F, RR: 16, SpO2: 92% on RA --> 96% 3L NC    Labs:  K 6.2, BUN/Cr 48/3.9, Glucose 319, Hgb 10.6, Trop 91 proBNP 8310, Mag 1.5   ABG: pH: 7.33, PO2: 102, PCO2: 32, HCO3: 17, SpO2: 98%  UA: N/A   CXR: Heart magnified by technique. TAVR  aortic valve again noted.Slightly increased scattered interstitial markings the lungs possibly chronic are unchanged. 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 Chest is similar to April 17 this year. Follow-up AP chest on December 4, 2024 at 11:14 AM. A temporary pacer wire has been inserted from right jugular approach with tip in the right atrioventricular area. IMPRESSION: Temporary pacer wire inserted.  CT: N/A   EKG: Marked sinus bradycardia HR 34 Left axis deviation Left bundle branch block  Received in the ED: Atropine 1mg x 1, Calcium Chloride 500mg IVP x 1, Humalin 10u IVP x 1, Mag Sulfate 2g x 1, Lokelma 10mg x 1, Dextrose 50% 50cc x 1, NS 500cc bolus x 1, Albuterol neb    89 yo M with PMH HTN, CAD, HLD, DM2, mitral valve replacement, bladder cancer s/p TURBT (2023), BPH, aortic stenosis s/p TAVR (2017), ulcerative colitis presents to the ED with 2 days of dizziness. Patient is unable to provide history, history was mainly obtained from wife. Per wife, pt had generalized malaise for the past three days. No specific trigger for symptoms. Pt had associated dizziness, unsteadiness with ambulation, along with diaphoresis. Family denies changes in urinary frequency, at baseline has increased frequency due to history of bladder cancer. Pt did have some constipation for a few days, which resolved with a BM last night per wife.   Pt had seen his cardiologist at the end of November, Dr. Jacob, where the medication for his LE edema was changed from Lasix to Torsemide. Has had no issues with this medication so far.     ED Course:   Vitals: BP: 108/60, HR: 42 -> 30 -> 42 -> 24 -> 78, Temp: 97F, RR: 16, SpO2: 92% on RA --> 96% 3L NC    Labs:  K 6.2, BUN/Cr 48/3.9, Glucose 319, Hgb 10.6, Trop 91 proBNP 8310, Mag 1.5   ABG: pH: 7.33, PO2: 102, PCO2: 32, HCO3: 17, SpO2: 98%  UA: N/A   CXR: Heart magnified by technique. TAVR  aortic valve again noted.Slightly increased scattered interstitial markings the lungs possibly chronic are unchanged. 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 Chest is similar to April 17 this year. Follow-up AP chest on December 4, 2024 at 11:14 AM. A temporary pacer wire has been inserted from right jugular approach with tip in the right atrioventricular area. IMPRESSION: Temporary pacer wire inserted.  CT: N/A   EKG: Marked sinus bradycardia HR 34 Left axis deviation Left bundle branch block  Received in the ED: Atropine 1mg x 1, Calcium Chloride 500mg IVP x 1, Humalin 10u IVP x 1, Mag Sulfate 2g x 1, Lokelma 10mg x 1, Dextrose 50% 50cc x 1, NS 500cc bolus x 1, Albuterol neb    89 yo M with PMH HTN, CAD, HLD, DM2, mitral valve replacement, bladder cancer s/p TURBT (2023), Anemia , BPH, aortic stenosis s/p TAVR (2017), ulcerative colitis presents to the ED with 2 days of dizziness. Patient is unable to provide history, history was mainly obtained from wife. Per wife, pt had generalized malaise for the past three days. No specific trigger for symptoms. Pt had associated dizziness, unsteadiness with ambulation, along with diaphoresis. Family denies changes in urinary frequency, at baseline has increased frequency due to history of bladder cancer. Pt did have some constipation for a few days, which resolved with a BM last night per wife.   Pt had seen his cardiologist at the end of November, Dr. Jacob, where the medication for his LE edema was changed from Lasix to Torsemide. Has had no issues with this medication so far.   Son reports med compliance may be an issue, thinks his dad could be mixing up pills including cardiac and DM meds which can be contributory to above.      ED Course:   Vitals: BP: 108/60, HR: 42 -> 30 -> 42 -> 24 -> 78, Temp: 97F, RR: 16, SpO2: 92% on RA --> 96% 3L NC    Labs:  K 6.2, BUN/Cr 48/3.9, Glucose 319, Hgb 10.6, Trop 91 proBNP 8310, Mag 1.5   ABG: pH: 7.33, PO2: 102, PCO2: 32, HCO3: 17, SpO2: 98%  UA: N/A   CXR: Heart magnified by technique. TAVR  aortic valve again noted.Slightly increased scattered interstitial markings the lungs possibly chronic are unchanged. 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 Chest is similar to April 17 this year. Follow-up AP chest on December 4, 2024 at 11:14 AM. A temporary pacer wire has been inserted from right jugular approach with tip in the right atrioventricular area. IMPRESSION: Temporary pacer wire inserted.  CT: N/A   EKG: Marked sinus bradycardia HR 34 Left axis deviation Left bundle branch block  Received in the ED: Atropine 1mg x 1, Calcium Chloride 500mg IVP x 1, Humalin 10u IVP x 1, Mag Sulfate 2g x 1, Lokelma 10mg x 1, Dextrose 50% 50cc x 1, NS 500cc bolus x 1, Albuterol neb

## 2024-12-04 NOTE — CONSULT NOTE ADULT - ASSESSMENT
Patient is a 89yo M with a PMH of HTN, CAD, HLD, DM2, mitral valve replacement, bladder cancer s/p TURBT (), aortic stenosis s/p TAVR (), ulcerative colitis being admitted to ICU for complete heart block, YANETH, hyperkalemia.    - Patient with marked bradycardia in ED. On Metoprolol at home.    - EKbpm, marked sinus bradycardia, LBBB  - Temporary pacer wire placed emergently in ED, placement confirmed via Cxray    - Plan for permanent pacemaker with EP tomorrow  - Glucagon given to reverse effects of home Metoprolol   - Rec close tele monitoring in the ICU   - Hyperkalemia cocktail given in ED    - Monitor and replete lytes, keep K>4, Mg>2.    - No clear evidence of acute ischemia, no anginal complaints    - Trop 91, likely elevated in setting of demand ischemia, YANETH and significant electrolyte abnormalities --> trend to peak   - Monitor closely for the development of anginal symptoms or clinical signs of ischemia.     - No meaningful evidence of volume overload.  - Pro BNP 8310  - TTE (2024): LV appears grossly normal, LA mildly dilated, hx of TAVR and MV replacement, mild MR   - F/u repeat TTE   - Strict I/Os, daily weights.    - BP stable, monitor routine hemodynamics.  - Hold home Metoprolol in setting of bradycardia, Hold home Lisinopril in setting of YANETH    - Other cardiovascular workup will depend on clinical course.  - All other workup per primary team.  - Will continue to follow.

## 2024-12-04 NOTE — ED ADULT TRIAGE NOTE - WEIGHT IN LBS
1  Injury of neck, subsequent encounter     2  Neck pain     3  Concussion without loss of consciousness, subsequent encounter       No orders of the defined types were placed in this encounter  Imaging Studies (I personally reviewed images in PACS and report):  MRI of the cervical spine dated 10/8/2019  Normal MRI of the spine  Impression: This patient presents after a football injury that led to bilateral upper extremity paresthesias and weakness  He was taken by EMS to the emergency room where his workup was negative  His neurological exam is again completely normal today  He has full range of motion with all cervical motions  He no longer has any cervicalgia  Concussion testing is all normal today  In light of this, we will start the return to play protocol which he can start at step 3 and can be cleared through the   I will see him back as needed  Return if symptoms worsen or fail to improve  HPI:  Kristel Hopper is a 12 y o  male  who presents for evaluation of   Chief Complaint   Patient presents with    Follow-up       Onset/Mechanism:  10/8/2019-the patient was playing a football game and fell and hyperextended his neck  He had loss of sensation in both his arms and legs that lasted for about an hour  He had no loss of strength  In light of this, he was spine boarded and transported to the hospital   He had evaluation there  Since then, he has improved and now has soreness in the middle of his neck on the top  Location: Mid-upper neck  Quality: Annoying and aching  Radiation: Denies  Provocative: Denies  Severity: 4/10 in severity  Associated Symptoms: Denies  Treatment so far: Spine board and ED evaluation  Trajectory of symptoms since last visit:  No longer having any neck pain  He has been playing with his friends outside and has had no symptoms  Review of Systems   Constitutional: Positive for activity change  Negative for fever     HENT: Negative for trouble swallowing  Eyes: Negative for visual disturbance  Respiratory: Negative for shortness of breath  Cardiovascular: Negative for chest pain  Gastrointestinal: Negative for nausea  Endocrine: Negative for polydipsia  Genitourinary: Negative for difficulty urinating  Musculoskeletal: Negative for arthralgias, gait problem, neck pain and neck stiffness  Skin: Negative for rash  Allergic/Immunologic: Negative for immunocompromised state  Neurological: Negative for dizziness, weakness and numbness  Hematological: Does not bruise/bleed easily  Psychiatric/Behavioral: Negative for decreased concentration  Following history reviewed and updated:  History reviewed  No pertinent past medical history  Past Surgical History:   Procedure Laterality Date    GA LAP,APPENDECTOMY N/A 4/4/2019    Procedure: APPENDECTOMY LAPAROSCOPIC;  Surgeon: Shazia Cleveland DO;  Location: [facility];  Service: General     Social History   Social History     Substance and Sexual Activity   Alcohol Use Never    Frequency: Never     Social History     Substance and Sexual Activity   Drug Use Never     Social History     Tobacco Use   Smoking Status Never Smoker   Smokeless Tobacco Never Used     Family History   Problem Relation Age of Onset    No Known Problems Mother     No Known Problems Father      No Known Allergies     Constitutional:  BP (!) 122/74 (BP Location: Right arm, Patient Position: Sitting, Cuff Size: Standard)   Pulse 88   Ht 5' 11" (1 803 m)   Wt 66 kg (145 lb 9 6 oz)   BMI 20 31 kg/m²    General: NAD  Eyes: Anicteric sclerae  Neck: Supple  Lungs: Unlabored breathing  Cardiovascular: No lower extremity edema  Skin: Intact without erythema  Neurologic: Sensation intact to light touch  Psychiatric: Mood and affect are appropriate  Back Exam     Comments:  Cervical Exam  Inspection:   No obvious deformities, lesions or rashes    ROM: Cervical flexion to chin, extension to 20°  Rotation and side-bending are normal   Palpation:  Nontender to palpation  There are no step offs  Neuro:  Normal neurological exam   5/5 strength with bilateral elbow flexion, wrist extension, elbow extension, hand  and 5th digit abduction  5/5 strength in all lower extremity motions  Sensation is intact in dermatomes C5-T1, L2-S1  No clonus and no Hair's  Special tests: Normal bilateral Spurling's, Bakody's sign  Extraocular muscle testing is all within normal limits including saccades, no nystagmus and smooth visual pursuit  Procedures - none for this visit  160

## 2024-12-04 NOTE — CHART NOTE - NSCHARTNOTEFT_GEN_A_CORE
: ESDRAS Caballero    INDICATION: Shock    PROCEDURE:  [x] LIMITED ECHO  [ ] LIMITED CHEST  [ ] LIMITED RETROPERITONEAL  [ ] LIMITED ABDOMINAL  [ ] LIMITED DVT  [ ] NEEDLE GUIDANCE VASCULAR  [ ] NEEDLE GUIDANCE THORACENTESIS  [ ] NEEDLE GUIDANCE PARACENTESIS  [ ] NEEDLE GUIDANCE PERICARDIOCENTESIS  [ ] OTHER    FINDINGS: Limited windows.  Preserved LV systolic function.  Preserved RV systolic function.  No pericardial effusion.    INTERPRETATION:  Grossly normal GDE.    -Vamsi Correa M.D.   PGY-6 EM/IM/CC  Pager #[phone]      Images stored in [employer]

## 2024-12-04 NOTE — ED PROVIDER NOTE - CLINICAL SUMMARY MEDICAL DECISION MAKING FREE TEXT BOX
Here due to symptomatic bradycardia with differential inclusive of heart block, ACS, electrolyte abnormality.  Check labs, EKG monitor, cards evaluation, likely admit.

## 2024-12-04 NOTE — CONSULT NOTE ADULT - SUBJECTIVE AND OBJECTIVE BOX
[facility]  Dr. Juarez, Dr. Rowe, Dr. Carter, Dr. Pierre, Dr. Hoyos, Dr. Reynoso                                        [facility], [facility], [address]                                                 [address] [address]  Ph: [phone]  Fax: [phone]                                         Ph: [phone]  Fax: [phone]      Patient is a 89y old  Male who presents with a chief complaint of   HPI:      PAST MEDICAL HISTORY:  Diabetes    HTN (hypertension)    CAD (coronary artery disease)    HLD (hyperlipidemia)    Bladder cancer    Aortic stenosis        PAST SURGICAL HISTORY:  No significant past surgical history    S/P TAVR (transcatheter aortic valve replacement)    Mitral valve replaced    History of transurethral resection of bladder tumor (TURBT)        FAMILY HISTORY:  No pertinent family history in first degree relatives        SOCIAL HISTORY:    Allergies    simvastatin (Other)  Cipro (Other)  lovastatin (Other)  sulfa drugs (Other)    Intolerances      Home Medications:  alogliptin 25 mg oral tablet: 1 tab(s) orally once a day (17 Apr 2024 22:06)  aspirin 81 mg oral tablet: 1 tab(s) orally once a day (17 Apr 2024 22:07)  glipiZIDE 10 mg oral tablet: 1 tab(s) orally 2 times a day (17 Apr 2024 22:05)  metFORMIN 1000 mg oral tablet: 1 tab(s) orally 2 times a day (17 Apr 2024 22:07)  Vitamin B12 1000 mcg oral tablet: 1 tab(s) orally once a day (17 Apr 2024 22:31)    MEDICATIONS  (STANDING):  albuterol    0.083% 10 milliGRAM(s) Nebulizer once  calcium gluconate IVPB 2 Gram(s) IV Intermittent Once  dextrose 50% Injectable 50 milliLiter(s) IV Push once  insulin regular  human recombinant 10 Unit(s) IV Push Once  sodium zirconium cyclosilicate 10 Gram(s) Oral Once    MEDICATIONS  (PRN):      REVIEW OF SYSTEMS:  General:   Respiratory: No cough, SOB  Cardiovascular: No CP or Palpitations	  Gastrointestinal: No nausea, Vomiting. No diarrhea  Genitourinary: No urinary complaints	  Musculoskeletal: No leg swelling, No new rash or lesions	  Neurological: 	  all other systems negative    T(F): 98 (12-04-24 @ 09:34), Max: 98 (12-04-24 @ 09:34)  HR: 42 (12-04-24 @ 09:34) (30 - 42)  BP: 92/52 (12-04-24 @ 09:34) (92/52 - 124/58)  RR: 16 (12-04-24 @ 09:34) (16 - 16)  SpO2: 96% (12-04-24 @ 09:34) (92% - 96%)  Wt(kg): --    PHYSICAL EXAM:  General: NAD  Respiratory: b/l air entry  Cardiovascular: S1 S2  Gastrointestinal: soft  Extremities: edema        12-04    136  |  101  |  48[H]  ----------------------------<  319[H]  6.2[HH]   |  23  |  3.90[H]    Ca    9.2      04 Dec 2024 08:55  Mg     1.5     12-04    TPro  7.0  /  Alb  3.5  /  TBili  0.7  /  DBili  x   /  AST  32  /  ALT  35  /  AlkPhos  50  12-04                          10.6   8.54  )-----------( 210      ( 04 Dec 2024 08:55 )             32.0       Potassium: 6.2 mmol/L (12-04 @ 08:55)  Blood Urea Nitrogen: 48 mg/dL (12-04 @ 08:55)  Calcium: 9.2 mg/dL (12-04 @ 08:55)  Hemoglobin: 10.6 g/dL (12-04 @ 08:55)      Creatinine, Serum: 3.90 (12-04 @ 08:55)      Urinalysis Basic - ( 04 Dec 2024 08:55 )    Color: x / Appearance: x / SG: x / pH: x  Gluc: 319 mg/dL / Ketone: x  / Bili: x / Urobili: x   Blood: x / Protein: x / Nitrite: x   Leuk Esterase: x / RBC: x / WBC x   Sq Epi: x / Non Sq Epi: x / Bacteria: x      LIVER FUNCTIONS - ( 04 Dec 2024 08:55 )  Alb: 3.5 g/dL / Pro: 7.0 g/dL / ALK PHOS: 50 U/L / ALT: 35 U/L / AST: 32 U/L / GGT: x                       I&O's Detail             [facility]ph[facility]  Dr. Juarez, Dr. Rowe, Dr. Carter, Dr. Pierre, Dr. Hoyos, Dr. Reynoso                                        [facility], [facility], [address]                                                 [address] [address]  Ph: [phone]  Fax: [phone]                                         Ph: [phone]  Fax: [phone]      Patient is a 89y old  Male who presents with a chief complaint of dizziness.    HPI:  89 y/o M PMH CAD, AS s/p TAVR, DM2, s/p MVR, bladder cancer s/p TURBT here complaining of 2 days of intermittent dizziness. EMS noted heart rate 27, gave atropine which helps some.  Patient denies recent illness, fever chills, chest pain, shortness of breath, palpitations, abdominal pain.     Renal consult called for YANETH, Hyperkalemia.     PAST MEDICAL HISTORY:  Diabetes    HTN (hypertension)    CAD (coronary artery disease)    HLD (hyperlipidemia)    Bladder cancer    Aortic stenosis        PAST SURGICAL HISTORY:  No significant past surgical history    S/P TAVR (transcatheter aortic valve replacement)    Mitral valve replaced    History of transurethral resection of bladder tumor (TURBT)        FAMILY HISTORY:  No pertinent family history in first degree relatives        SOCIAL HISTORY: No smoking or alcohol use     Allergies    simvastatin (Other)  Cipro (Other)  lovastatin (Other)  sulfa drugs (Other)    Intolerances      Home Medications:  alogliptin 25 mg oral tablet: 1 tab(s) orally once a day (17 Apr 2024 22:06)  aspirin 81 mg oral tablet: 1 tab(s) orally once a day (17 Apr 2024 22:07)  glipiZIDE 10 mg oral tablet: 1 tab(s) orally 2 times a day (17 Apr 2024 22:05)  metFORMIN 1000 mg oral tablet: 1 tab(s) orally 2 times a day (17 Apr 2024 22:07)  Vitamin B12 1000 mcg oral tablet: 1 tab(s) orally once a day (17 Apr 2024 22:31)    MEDICATIONS  (STANDING):  albuterol    0.083% 10 milliGRAM(s) Nebulizer once  calcium gluconate IVPB 2 Gram(s) IV Intermittent Once  dextrose 50% Injectable 50 milliLiter(s) IV Push once  insulin regular  human recombinant 10 Unit(s) IV Push Once  sodium zirconium cyclosilicate 10 Gram(s) Oral Once    MEDICATIONS  (PRN):      REVIEW OF SYSTEMS:  General: weak  Respiratory: No cough, SOB  Cardiovascular: No CP or Palpitations	  Gastrointestinal: No nausea, Vomiting. No diarrhea  Genitourinary: No urinary complaints	  Musculoskeletal: No leg swelling, No new rash or lesions	  all other systems negative    T(F): 98 (12-04-24 @ 09:34), Max: 98 (12-04-24 @ 09:34)  HR: 42 (12-04-24 @ 09:34) (30 - 42)  BP: 92/52 (12-04-24 @ 09:34) (92/52 - 124/58)  RR: 16 (12-04-24 @ 09:34) (16 - 16)  SpO2: 96% (12-04-24 @ 09:34) (92% - 96%)  Wt(kg): --    PHYSICAL EXAM:  General: NAD  Respiratory: b/l air entry  Cardiovascular: S1 S2  Gastrointestinal: soft  Extremities: edema        12-04    136  |  101  |  48[H]  ----------------------------<  319[H]  6.2[HH]   |  23  |  3.90[H]    Ca    9.2      04 Dec 2024 08:55  Mg     1.5     12-04    TPro  7.0  /  Alb  3.5  /  TBili  0.7  /  DBili  x   /  AST  32  /  ALT  35  /  AlkPhos  50  12-04                          10.6   8.54  )-----------( 210      ( 04 Dec 2024 08:55 )             32.0       Potassium: 6.2 mmol/L (12-04 @ 08:55)  Blood Urea Nitrogen: 48 mg/dL (12-04 @ 08:55)  Calcium: 9.2 mg/dL (12-04 @ 08:55)  Hemoglobin: 10.6 g/dL (12-04 @ 08:55)      Creatinine, Serum: 3.90 (12-04 @ 08:55)      Urinalysis Basic - ( 04 Dec 2024 08:55 )    Color: x / Appearance: x / SG: x / pH: x  Gluc: 319 mg/dL / Ketone: x  / Bili: x / Urobili: x   Blood: x / Protein: x / Nitrite: x   Leuk Esterase: x / RBC: x / WBC x   Sq Epi: x / Non Sq Epi: x / Bacteria: x      LIVER FUNCTIONS - ( 04 Dec 2024 08:55 )  Alb: 3.5 g/dL / Pro: 7.0 g/dL / ALK PHOS: 50 U/L / ALT: 35 U/L / AST: 32 U/L / GGT: x

## 2024-12-04 NOTE — CONSULT NOTE ADULT - SUBJECTIVE AND OBJECTIVE BOX
Department of Cardiology                                                                     Division of Electrophysiology                                                               [facility] / [address] [address]                                                                                 [phone]                                                                                                                               Electrophysiology Consult / Pre-Procedure Note    Consulted for unstable, symptomatic bradycardia / high degree AV block. 12 lead ECG with SB, 2 degree, 3:1 AV block, LBBB. Subsequent tele strips with pauses. BP which initially stable in 110s now 90s. Worsening metabolic encephalopathy.   Emergent bedside RIJ intro and TVPW placed. Consent by wife at bedside and son-in-law and daughter via telephone. HD improved, mental status with mild improvement (able to tell me his name, says he is thirsty). BP improved to 110s.  Pt also with YANETH and hyperkalemia w/ cocktail in progress during above as rx'd by ED team. Also given glucagon for possible BB OD (on home metoprolol).    Subjective/ROS: unable to obtain at this time d/t metabolic encephalopathy and clinical condition        HPI:  89 yo M with PMH HTN, CAD, HLD, DM2, mitral valve replacement, bladder cancer s/p TURBT (2023), BPH, aortic stenosis s/p TAVR (2017), ulcerative colitis presents to the ED with 2 days of dizziness. Patient is unable to provide history, history was mainly obtained from wife. Per wife, pt had generalized malaise for the past three days. No specific trigger for symptoms. Pt had associated dizziness, unsteadiness with ambulation, along with diaphoresis. Family denies changes in urinary frequency, at baseline has increased frequency due to history of bladder cancer. Pt did have some constipation for a few days, which resolved with a BM last night per wife.   Pt had seen his cardiologist at the end of November, Dr. Jacob, where the medication for his LE edema was changed from Lasix to Torsemide. Has had no issues with this medication so far.     ED Course:   Vitals: BP: 108/60, HR: 42 -> 30 -> 42 -> 24 -> 78, Temp: 97F, RR: 16, SpO2: 92% on RA --> 96% 3L NC    Labs:  K 6.2, BUN/Cr 48/3.9, Glucose 319, Hgb 10.6, Trop 91 proBNP 8310, Mag 1.5   ABG: pH: 7.33, PO2: 102, PCO2: 32, HCO3: 17, SpO2: 98%  UA: N/A   CXR: Heart magnified by technique. TAVR  aortic valve again noted.Slightly increased scattered interstitial markings the lungs possibly chronic are unchanged. 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 Chest is similar to April 17 this year. Follow-up AP chest on December 4, 2024 at 11:14 AM. A temporary pacer wire has been inserted from right jugular approach with tip in the right atrioventricular area. IMPRESSION: Temporary pacer wire inserted.  CT: N/A   EKG: Marked sinus bradycardia HR 34 Left axis deviation Left bundle branch block  Received in the ED: Atropine 1mg x 1, Calcium Chloride 500mg IVP x 1, Humalin 10u IVP x 1, Mag Sulfate 2g x 1, Lokelma 10mg x 1, Dextrose 50% 50cc x 1, NS 500cc bolus x 1, Albuterol neb    (04 Dec 2024 11:35)      PAST MEDICAL & SURGICAL HISTORY:  Diabetes  HTN (hypertension)  CAD (coronary artery disease)  HLD (hyperlipidemia)  Bladder cancer  Aortic stenosis  S/P TAVR (transcatheter aortic valve replacement)  Mitral valve replaced  History of transurethral resection of bladder tumor (TURBT)    FAMILY HISTORY:  No pertinent family history in first degree relatives    Social History:  Tobacco Usage:  former smoker, quit 1988  Alcohol Usage: denies  Substance Use:  denies  Lives with: at home with family  Ambulates: with walker (04 Dec 2024 11:35)      MEDICATIONS  (STANDING):  aspirin Suppository 300 milliGRAM(s) Rectal daily  atorvastatin 80 milliGRAM(s) Oral at bedtime  chlorhexidine 2% Cloths 1 Application(s) Topical <User Schedule>  heparin   Injectable 5000 Unit(s) SubCutaneous every 12 hours  insulin lispro (ADMELOG) corrective regimen sliding scale   SubCutaneous every 6 hours  sodium bicarbonate  Infusion 0.329 mEq/kG/Hr (150 mL/Hr) IV Continuous <Continuous>  sodium chloride 0.9%. 1000 milliLiter(s) (20 mL/Hr) IV Continuous <Continuous>    MEDICATIONS  (PRN):  dextrose Oral Gel 15 Gram(s) Oral once PRN Blood Glucose LESS THAN 70 milliGRAM(s)/deciliter  sodium chloride 0.9% lock flush 10 milliLiter(s) IV Push every 1 hour PRN Pre/post blood products, medications, blood draw, and to maintain line patency    Allergies: simvastatin (Other)  Cipro (Other)  lovastatin (Other)  sulfa drugs (Other)      T(C): 36.4 (12-04-24 @ 12:00), Max: 36.7 (12-04-24 @ 09:34)  HR: 80 (12-04-24 @ 14:00) (24 - 87), V-paced  TVPW settings: rate 80, mA 15, threshold 1.5, secured at 40cm  BP: 106/52 (12-04-24 @ 14:00) (83/48 - 150/84)  RR: 23 (12-04-24 @ 14:00) (16 - 31)  SpO2: 93% (12-04-24 @ 14:00) (91% - 99%)      LABS:	 	                        9.1    10.39 )-----------( 178      ( 04 Dec 2024 13:19 )             27.3     12-04    139  |  106  |  53[H]  ----------------------------<  240[H]  4.6   |  20[L]  |  3.70[H]    Ca    9.5      04 Dec 2024 13:19  Phos  2.8     12-04  Mg     1.9     12-04    TPro  7.0  /  Alb  3.5  /  TBili  0.7  /  DBili  x   /  AST  32  /  ALT  35  /  AlkPhos  50  12-04    Troponin I, High Sensitivity Result: 158.6 ng/L (12-04-24 @ 13:19)  Troponin I, High Sensitivity Result: 91.0 ng/L (12-04-24 @ 08:55)    Lactate, Blood: 7.6 mmol/L (12.04.24 @ 13:19)    Pro-Brain Natriuretic Peptide: 8310 pg/mL (12.04.24 @ 08:55)      12 Lead ECG:   Ventricular Rate 34 BPM  Atrial Rate 34 BPM  P-R Interval 190 ms  QRS Duration 160 ms  Q-T Interval 636 ms  QTC Calculation(Bazett) 478 ms  P Axis 43 degrees  R Axis -57 degrees  T Axis 86 degrees  Diagnosis Line *** Critical TestResult: Low HR  Marked sinus bradycardia  2:1 AVB  Left axis deviation  Left bundle branch block  Confirmed by CLARISSA PADILLA ([id number]) on 12/4/2024 1:04:05 PM (12-04-24 @ 08:29)      Physical Exam:  Constitutional: acute distress, monalisa-arrest  Neuro: minimally responsive  HEENT: deferred  Neck: supple  CV: bradycardia  Pulm/chest: agonal, deferred lung exam  Abd: deferred  Ext: moving arms, 1+ BLE edema  Skin: gray, cool

## 2024-12-04 NOTE — CONSULT NOTE ADULT - ASSESSMENT
YANETH: Prerenal azotemia, On ACEI, r/o Urinary retention  Hyperkalemia: YANETH, On ACEI  Bradycardia, Heart block  Diabetes  Hypomagnesemia    Hold metformin, ACEI. Medical Rx for hyperkalemia as ordered. Cardiology evaluation. Check lactic acid level (pt on metformin).   Check bedside sonogram. Monitor blood sugar levels. Insulin coverage as needed. Dietary restriction.   Monitor BP trend. IV hydration as fluid status allows. ICU evaluation. Avoid nephrotoxic meds as possible. Avoid ACEI, ARB, NSAIDs and IV contrast.   Will follow electrolytes and renal function trend.     Further recommendations pending clinical course. Thank you for the courtesy of this referral.  YANETH: Prerenal azotemia, On ACEI, r/o Urinary retention  Hyperkalemia: YANETH, On ACEI  Bradycardia, Heart block  Diabetes  Hypomagnesemia    Hold metformin, ACEI. Medical Rx for hyperkalemia as ordered. Cardiology evaluation. Check lactic acid level (pt on metformin). Magnesium supplementation.   Check bedside sonogram. Monitor blood sugar levels. Insulin coverage as needed. Dietary restriction.   Monitor BP trend. IV hydration as fluid status allows. ICU evaluation. Avoid nephrotoxic meds as possible. Avoid ACEI, ARB, NSAIDs and IV contrast.   Will follow electrolytes and renal function trend. Discussed with patients wife at the bedside.     Further recommendations pending clinical course. Thank you for the courtesy of this referral.

## 2024-12-04 NOTE — CONSULT NOTE ADULT - SUBJECTIVE AND OBJECTIVE BOX
Patient is a 89y old  Male who presents with a chief complaint of     HPI: Patient is a 89yo M with a PMH of HTN, CAD, HLD, DM2, mitral valve replacement, bladder cancer s/p TURBT (), aortic stenosis s/p TAVR (), ulcerative colitis who presents to the ED with 2 days of dizziness. EMS stated the patients HR was in the 20's. Patient was given 1mg Atropine by EMS prior to arrival. In the ED, patient was found to have complete heart block, with HR persistently in the 30's-40's. A temporary pacemaker wire was placed emergently in the ED. Patient follows with Cardio Dr. Alexandr oliveira Amalga.       PAST MEDICAL & SURGICAL HISTORY:  Diabetes      HTN (hypertension)      CAD (coronary artery disease)      HLD (hyperlipidemia)      Bladder cancer      Aortic stenosis      S/P TAVR (transcatheter aortic valve replacement)      Mitral valve replaced      History of transurethral resection of bladder tumor (TURBT)                TTE (2024): LV appears grossly normal, LA mildly dilated, hx of TAVR and MV replacement, mild MR       MEDICATIONS  (STANDING):  chlorhexidine 2% Cloths 1 Application(s) Topical <User Schedule>  glucagon  Injectable 5 milliGRAM(s) IV Push once    MEDICATIONS  (PRN):      FAMILY HISTORY:  No pertinent family history in first degree relatives      Denies Family history of CAD or early MI    ROS:  Unable to obtain       SOCIAL HISTORY:    No tobacco, Alcohol or Ddrug use    Vital Signs Last 24 Hrs  T(C): 36.7 (04 Dec 2024 09:34), Max: 36.7 (04 Dec 2024 09:34)  T(F): 98 (04 Dec 2024 09:34), Max: 98 (04 Dec 2024 09:34)  HR: 78 (04 Dec 2024 11:22) (24 - 78)  BP: 128/68 (04 Dec 2024 11:22) (92/52 - 128/68)  BP(mean): --  RR: 18 (04 Dec 2024 11:22) (16 - 18)  SpO2: 96% (04 Dec 2024 11:22) (92% - 96%)    Parameters below as of 04 Dec 2024 11:22  Patient On (Oxygen Delivery Method): nasal cannula  O2 Flow (L/min): 3      Physical Exam:  General: Acutely ill   HEENT: NCAT, PERRLA, EOMI bl, moist mucous membranes   Neck: Supple, nontender, no mass  Neurology: A&Ox3  Respiratory: CTA B/L, No W/R/R  CV: bradycardic   Abdominal: Soft, NT, ND +BSx4, no palpable masses  Extremities: No C/C/E  Skin: warm, dry, normal color      ECbpm, marked sinus bradycardia, LBBB    I&O's Detail      LABS:                        10.6   8.54  )-----------( 210      ( 04 Dec 2024 08:55 )             32.0     12-    136  |  101  |  48[H]  ----------------------------<  319[H]  6.2[HH]   |  23  |  3.90[H]    Ca    9.2      04 Dec 2024 08:55  Mg     1.5     12-04    TPro  7.0  /  Alb  3.5  /  TBili  0.7  /  DBili  x   /  AST  32  /  ALT  35  /  AlkPhos  50  12-04          Urinalysis Basic - ( 04 Dec 2024 08:55 )    Color: x / Appearance: x / SG: x / pH: x  Gluc: 319 mg/dL / Ketone: x  / Bili: x / Urobili: x   Blood: x / Protein: x / Nitrite: x   Leuk Esterase: x / RBC: x / WBC x   Sq Epi: x / Non Sq Epi: x / Bacteria: x      I&O's Summary    BNP 8310

## 2024-12-04 NOTE — CONSULT NOTE ADULT - ASSESSMENT
89 year old male with PMHx CAD, AS s/p TAVR (2017), mitral valve replacement, DM2, bladder CA s/p TURBT (2023) admitted to the ICU for CHB requiring emergent TVP. Also found to have YANETH and hyperkalemia. Hyperkalemia cocktail given in the ED. Glucagon given to reverse effects of home Metoprolol.    Symptomatic bradycardia / high degree AV block  YANETH  hyperkalemia (improved after cocktail)  hypomagnesemia (replaced)  metabolic encephalopathy  lactic acidosis  possible beta-blocker overdose (given glucagon in ED)  troponinemia (likely due to demand ischemia, can trend but no need for emergent intervention)      Son reports med compliance may be an issue, thinks his dad could be mixing up pills including cardiac and DM meds which can be contributory to above.    s/p emergent RIJ intro and TVPW placement  TVPW settings: rate 80, mA 15, threshold 1.5, secured at 40cm  placement of distal TVPW on CXR not ideal but capture and threshold more than acceptable, will not manipulate as underlying rhythm still not compatible with life and       continue v-pacing at 80 with TVPW  avoid AV chase blockers  hold nephrotoxic meds  remainder of plan per ICU team/non-interventional cardiology   Per ICU consult: Patient is DNR/DNI. Doesn't wish to pursue dialysis at this time. Confirmed patient's wishes with healthcare proxy (wife, Jenn Archibald) at bedside. MOLST in chart. EP will consider micra PPM only if clinical condition improves (ie encephalopathy and YANETH resolve).     Above d/w ICU team and pt's extended family. All in agreement with plan.    prognosis: poor

## 2024-12-04 NOTE — H&P ADULT - PROBLEM SELECTOR PLAN 9
Continue home tamsulosin Takes tamsulosin at home  - Hold as pt is NPO Takes metformin, glipizide, sitagliptin at home  - Hold home diabetic medications  - Start insulin sliding scale  - NPO, FS Q6H, hypoglycemia protocol  Follow HbA1C

## 2024-12-05 NOTE — DIETITIAN INITIAL EVALUATION ADULT - PERTINENT MEDS FT
MEDICATIONS  (STANDING):  aspirin  chewable 81 milliGRAM(s) Oral daily  atorvastatin 80 milliGRAM(s) Oral at bedtime  ceFAZolin   IVPB 2000 milliGRAM(s) IV Intermittent once  chlorhexidine 2% Cloths 1 Application(s) Topical <User Schedule>  dextrose 5%. 1000 milliLiter(s) (50 mL/Hr) IV Continuous <Continuous>  dextrose 5%. 1000 milliLiter(s) (100 mL/Hr) IV Continuous <Continuous>  dextrose 50% Injectable 25 Gram(s) IV Push once  dextrose 50% Injectable 12.5 Gram(s) IV Push once  dextrose 50% Injectable 25 Gram(s) IV Push once  glucagon  Injectable 1 milliGRAM(s) IntraMuscular once  heparin   Injectable 5000 Unit(s) SubCutaneous every 8 hours  insulin lispro (ADMELOG) corrective regimen sliding scale   SubCutaneous every 6 hours  lactated ringers. 1000 milliLiter(s) (75 mL/Hr) IV Continuous <Continuous>  sodium chloride 0.9%. 1000 milliLiter(s) (20 mL/Hr) IV Continuous <Continuous>    MEDICATIONS  (PRN):  dextrose Oral Gel 15 Gram(s) Oral once PRN Blood Glucose LESS THAN 70 milliGRAM(s)/deciliter  sodium chloride 0.9% lock flush 10 milliLiter(s) IV Push every 1 hour PRN Pre/post blood products, medications, blood draw, and to maintain line patency

## 2024-12-05 NOTE — PROGRESS NOTE ADULT - PROBLEM SELECTOR PLAN 10
Takes tamsulosin at home  - Hold as pt is NPO Takes metformin, glipizide, sitagliptin at home  - Hold home diabetic medications  - Start insulin sliding scale  - NPO, FS Q6H, hypoglycemia protocol  A1c 7.6

## 2024-12-05 NOTE — PROGRESS NOTE ADULT - PROBLEM SELECTOR PLAN 4
#K 6.2   - S/P hyperkalemia protocol     #Mg 1.5  - S/P Mag Sulfate 2mg IVPB x 1 ? etiology  No ABx  CBC in AM   As per ICU-No work up as likely CMO

## 2024-12-05 NOTE — DISCHARGE NOTE PROVIDER - DISCHARGE SERVICE FOR PATIENT
LM for patient that he should be done using the drops on Thursday.  If he does not have enough to get through till Thursday he should give us a call back.  Also, he needs to set up a recheck with Dr. Sparrow in 2 weeks.    on the discharge service for the patient. I have reviewed and made amendments to the documentation where necessary.

## 2024-12-05 NOTE — CONSULT NOTE ADULT - REASON FOR ADMISSION
symptomatic bradycardia, YANETH, hyperkalemia

## 2024-12-05 NOTE — DIETITIAN INITIAL EVALUATION ADULT - PROBLEM SELECTOR PLAN 3
AMS 2/2 Metabolic encephalopathy ,   Luis Manuel, Bradycardia,   Supportive care  IVF,[facility]  Hold home meds

## 2024-12-05 NOTE — DIETITIAN INITIAL EVALUATION ADULT - NS FNS DIET ORDER
Diet, Soft and Bite Sized:   Consistent Carbohydrate {No Snacks}  No Concentrated Potassium (12-04-24 @ 17:28)  Diet, NPO after Midnight:      NPO Start Date: 04-Dec-2024,   NPO Start Time: 23:59  Except Medications (12-04-24 @ 12:21)

## 2024-12-05 NOTE — CONSULT NOTE ADULT - CONVERSATION DETAILS
Met with spouse and multiple children on this date, introduced self and role  reviewed current clinical status including bradycardic event, poor/waxing/waning mental status and renal dysfunction.  Family described patient as very functional and independent prior to event and are shocked by sudden change. However, they do not want him to be prolonged in this state if he will not return to previous baseline. AT this time, they have decided against a PPM, as they know his current state is not a quality of life he would find acceptable. We discussed managing his pain, sob and agitation with medication and considering a date/time to stop/remove TVP. DNR/DNI in place, would not want dialysis if renal status worsens, and they would like to manage symptoms knowing the unintended side effects that can be caused. They are aware that patient critically ill and can pass at anytime, however, they did want to meet with this provider again tomorrow at 12pm to discuss next steps in care for patient. Emotional support provided and contact information for our team given.

## 2024-12-05 NOTE — DIETITIAN INITIAL EVALUATION ADULT - ADD RECOMMEND
Encourage po intake meals/snacks/supplements as tolerated. Recommend glucerna 8oz po BID. Recommend MVI with minerals daily, 500mg Vit C daily.

## 2024-12-05 NOTE — CONSULT NOTE ADULT - CONSULT REASON
unstable, symptomatic bradycardia / high degree AV block
YANETH, Hyperkalemia
Heart Block
symptomatic bradycardia
goals of care, bradycardia s/p TVP, renal dysfunction

## 2024-12-05 NOTE — PROGRESS NOTE ADULT - SUBJECTIVE AND OBJECTIVE BOX
Patient is a 89y old  Male who presents with a chief complaint of symptomatic bradycardia, YANETH, hyperkalemia (04 Dec 2024 14:47)    BRIEF HOSPITAL COURSE: 89 year old male with a PMH of CAD, AS s/p TAVR, mitral valve replacement, DM2, bladder CA s/p TURBT who presented to the ED with dizziness.  Was noted to be bradycardic by EMS and given atropine.  Now s/p emergent TVP placement for high degree AV block.  Labs revealed an YANETH, hyperkalemia, lactic acidosis.  Admitted to ICU for further management.     ROS: Unable to obtain     PAST MEDICAL & SURGICAL HISTORY:  Diabetes  HTN (hypertension)  CAD (coronary artery disease)  HLD (hyperlipidemia)  Bladder cancer  Aortic stenosis  Anemia of chronic disease  S/P TAVR (transcatheter aortic valve replacement)  Mitral valve replaced  History of transurethral resection of bladder tumor (TURBT)        Medications:  aspirin Suppository 300 milliGRAM(s) Rectal daily  heparin   Injectable 5000 Unit(s) SubCutaneous every 12 hours  atorvastatin 80 milliGRAM(s) Oral at bedtime  dextrose 50% Injectable 25 Gram(s) IV Push once  dextrose 50% Injectable 12.5 Gram(s) IV Push once  dextrose 50% Injectable 25 Gram(s) IV Push once  dextrose Oral Gel 15 Gram(s) Oral once PRN  glucagon  Injectable 1 milliGRAM(s) IntraMuscular once  insulin lispro (ADMELOG) corrective regimen sliding scale   SubCutaneous every 6 hours  dextrose 5%. 1000 milliLiter(s) IV Continuous <Continuous>  dextrose 5%. 1000 milliLiter(s) IV Continuous <Continuous>  lactated ringers. 1000 milliLiter(s) IV Continuous <Continuous>  sodium chloride 0.9% lock flush 10 milliLiter(s) IV Push every 1 hour PRN  sodium chloride 0.9%. 1000 milliLiter(s) IV Continuous <Continuous>  chlorhexidine 2% Cloths 1 Application(s) Topical <User Schedule>      ICU Vital Signs Last 24 Hrs  T(C): 37.6 (05 Dec 2024 00:11), Max: 37.6 (05 Dec 2024 00:11)  T(F): 99.7 (05 Dec 2024 00:11), Max: 99.7 (05 Dec 2024 00:11)  HR: 80 (05 Dec 2024 00:10) (24 - 87)  BP: 102/62 (05 Dec 2024 00:00) (83/48 - 150/84)  BP(mean): 77 (05 Dec 2024 00:00) (57 - 112)  ABP: --  ABP(mean): --  RR: 28 (05 Dec 2024 00:10) (16 - 38)  SpO2: 100% (05 Dec 2024 00:10) (83% - 100%)    O2 Parameters below as of 05 Dec 2024 00:00  Patient On (Oxygen Delivery Method): nasal cannula  O2 Flow (L/min): 3          ABG - ( 04 Dec 2024 14:28 )  pH, Arterial: 7.40  pH, Blood: x     /  pCO2: 32    /  pO2: 63    / HCO3: 20    / Base Excess: -5.0  /  SaO2: 93.8                I&O's Detail    04 Dec 2024 07:01  -  05 Dec 2024 01:35  --------------------------------------------------------  IN:    IV PiggyBack: 62.5 mL    IV PiggyBack: 100 mL    Lactated Ringers Bolus: 1000 mL    Oral Fluid: 390 mL    Sodium Bicarbonate: 1500 mL  Total IN: 3052.5 mL    OUT:    Indwelling Catheter - Urethral (mL): 283 mL  Total OUT: 283 mL    Total NET: 2769.5 mL            LABS:                        9.1    10.39 )-----------( 178      ( 04 Dec 2024 13:19 )             27.3     12-05    139  |  100  |  53[H]  ----------------------------<  256[H]  4.1   |  28  |  3.60[H]    Ca    9.3      05 Dec 2024 00:41  Phos  2.5     12-05  Mg     2.1     12-05    TPro  7.0  /  Alb  3.5  /  TBili  0.7  /  DBili  x   /  AST  32  /  ALT  35  /  AlkPhos  50  12-04          CAPILLARY BLOOD GLUCOSE      POCT Blood Glucose.: 247 mg/dL (04 Dec 2024 23:16)      Urinalysis Basic - ( 05 Dec 2024 00:41 )    Color: x / Appearance: x / SG: x / pH: x  Gluc: 256 mg/dL / Ketone: x  / Bili: x / Urobili: x   Blood: x / Protein: x / Nitrite: x   Leuk Esterase: x / RBC: x / WBC x   Sq Epi: x / Non Sq Epi: x / Bacteria: x      CULTURES:      Physical Examination:    General: Elderly male, no acute distress     PULM: CTA    CVS: Regular, paced     ABD: Soft, nondistended    EXT: No edema    NEURO: Awake and alert but confused

## 2024-12-05 NOTE — PROGRESS NOTE ADULT - PROBLEM SELECTOR PLAN 1
Patient with symptomatic bradycardia on presentation, s/p atropine with some improvement for possible BB over dose   - EKG Marked sinus bradycardia HR 34 Left axis deviation Left bundle branch block --> varying degrees of  AV block with prolonged periods of high degree AVB   - S/P BB reversal with Glucagon.  - Started on TVP with interventional cards; 20/80   - Suspicion for BRASH syndrome due to bradycardia with associated abnormal renal findings --> pt on multiple nephrotoxic medications, including metformin, lisinopril, torsemide  - Hold all AV chase agents; hold home BB   - EP Eval-  Initialy plan for PPM placement if stable.  - TTE (4/2024): LV appears grossly normal, LA mildly dilated, hx of TAVR and MV replacement, mild MR   - TTE 12/5:  ejection fraction of 66 %, Bioprosthetic valve present. in the mitral position. A bioprosthetic valve replacement Transcatheter deployed (TAVR) valve replacement is present in the aortic position  - Trop elevation likely 2/2 demand ischemia, YANETH, electrolyte disturbances; trending to peak   - Plan to discontinue TVP after GOC discussion with palliative team; no further PPM placement   - Cardiology consulted (Dr. Ibrahim), recs appreciated  - Interventional cards consulted, recs appreciated   - Plan per ICU

## 2024-12-05 NOTE — DIETITIAN INITIAL EVALUATION ADULT - PROBLEM SELECTOR PLAN 2
BUN/Cr 48/3.9 -pre renal   - Baseline Cr ~1.5   - Suspect 2/2 various etiology, including dehydration, obstruction, and medication induced.   - Indwelling negron placed in due to significant retention; clear, yellow urine output  - Start sodium bicarb gtt as per Renal   - F/U urine electrolyte studies   - F/U UA, UCx   - Avoid nephrotoxic medications  - Nephro Dr. Juarez consulted, recs appreciated-Repeat BMP today & AM

## 2024-12-05 NOTE — PROGRESS NOTE ADULT - SUBJECTIVE AND OBJECTIVE BOX
[facility]    [address], [place], [place], David Frias      [phone]    CHIEF COMPLAINT: Patient is a 89y old  Male who presents with a chief complaint of symptomatic bradycardia, YANETH, hyperkalemia (05 Dec 2024 11:07)      Follow Up: heart block asystole    Interim history: Unable to provide a history on the basis of a poor mental status.  Events noted. tvp wire with intermittent capture and no reliable underlying rhythm. goals of care discussion in progress    MEDICATIONS  (STANDING):  aspirin  chewable 81 milliGRAM(s) Oral daily  atorvastatin 80 milliGRAM(s) Oral at bedtime  chlorhexidine 2% Cloths 1 Application(s) Topical <User Schedule>  dextrose 5%. 1000 milliLiter(s) (50 mL/Hr) IV Continuous <Continuous>  dextrose 5%. 1000 milliLiter(s) (100 mL/Hr) IV Continuous <Continuous>  dextrose 50% Injectable 25 Gram(s) IV Push once  dextrose 50% Injectable 12.5 Gram(s) IV Push once  dextrose 50% Injectable 25 Gram(s) IV Push once  glucagon  Injectable 1 milliGRAM(s) IntraMuscular once  heparin   Injectable 5000 Unit(s) SubCutaneous every 8 hours  insulin lispro (ADMELOG) corrective regimen sliding scale   SubCutaneous every 6 hours  lactated ringers. 1000 milliLiter(s) (75 mL/Hr) IV Continuous <Continuous>  sodium chloride 0.9%. 1000 milliLiter(s) (20 mL/Hr) IV Continuous <Continuous>    MEDICATIONS  (PRN):  dextrose Oral Gel 15 Gram(s) Oral once PRN Blood Glucose LESS THAN 70 milliGRAM(s)/deciliter  sodium chloride 0.9% lock flush 10 milliLiter(s) IV Push every 1 hour PRN Pre/post blood products, medications, blood draw, and to maintain line patency      REVIEW OF SYSTEMS: unable to provide  Vital Signs Last 24 Hrs  T(C): 38.4 (05 Dec 2024 11:58), Max: 38.4 (05 Dec 2024 11:58)  T(F): 101.2 (05 Dec 2024 11:58), Max: 101.2 (05 Dec 2024 11:58)  HR: 79 (05 Dec 2024 11:00) (60 - 81)  BP: 118/54 (05 Dec 2024 11:00) (83/48 - 143/97)  BP(mean): 78 (05 Dec 2024 11:00) (57 - 112)  RR: 23 (05 Dec 2024 11:00) (19 - 38)  SpO2: 92% (05 Dec 2024 11:00) (83% - 100%)    Parameters below as of 05 Dec 2024 11:00  Patient On (Oxygen Delivery Method): nasal cannula  O2 Flow (L/min): 5      I&O's Summary    04 Dec 2024 07:01  -  05 Dec 2024 07:00  --------------------------------------------------------  IN: 3990 mL / OUT: 623 mL / NET: 3367 mL    05 Dec 2024 07:01  -  05 Dec 2024 12:13  --------------------------------------------------------  IN: 300 mL / OUT: 110 mL / NET: 190 mL        Telemetry past 24h:  with intermittent loss of capture, wide complex escape beats    PHYSICAL EXAM:    Constitutional: well-nourished, well-developed, NAD   HEENT:  MMM, sclerae anicteric, conjunctivae clear, no oral cyanosis.  Pulmonary: Non-labored, breath sounds are clear bilaterally, No wheezing, rales or rhonchi  Cardiovascular: Regular, S1 and S2.  No murmur.  No rubs, gallops or clicks  Gastrointestinal: Bowel Sounds present, soft, nontender.   Lymph: No peripheral edema.   Neurological: unable to evaluate, poor mental status  Skin: No rashes.  Psych:  Mood & affect not evaluable    LABS: All Labs Reviewed:                        9.1    11.16 )-----------( 169      ( 05 Dec 2024 05:30 )             26.5                         9.1    10.39 )-----------( 178      ( 04 Dec 2024 13:19 )             27.3                         10.6   8.54  )-----------( 210      ( 04 Dec 2024 08:55 )             32.0     05 Dec 2024 05:30    136    |  100    |  54     ----------------------------<  243    4.3     |  32     |  3.50   05 Dec 2024 00:41    139    |  100    |  53     ----------------------------<  256    4.1     |  28     |  3.60   04 Dec 2024 18:50    138    |  102    |  53     ----------------------------<  221    4.6     |  27     |  3.50     Ca    9.6        05 Dec 2024 05:30  Ca    9.3        05 Dec 2024 00:41  Ca    9.6        04 Dec 2024 18:50  Phos  3.7       05 Dec 2024 05:30  Phos  2.5       05 Dec 2024 00:41  Phos  2.1       04 Dec 2024 18:50  Mg     2.2       05 Dec 2024 05:30  Mg     2.1       05 Dec 2024 00:41  Mg     1.7       04 Dec 2024 18:50    TPro  6.0    /  Alb  3.3    /  TBili  0.7    /  DBili  x      /  AST  36     /  ALT  37     /  AlkPhos  44     05 Dec 2024 05:30  TPro  7.0    /  Alb  3.5    /  TBili  0.7    /  DBili  x      /  AST  32     /  ALT  35     /  AlkPhos  50     04 Dec 2024 08:55          Blood Culture:     12-04 @ 13:19  TSH: 3.34      RADIOLOGY:    EKG:    Echo:  < from: TTE W or WO Ultrasound Enhancing Agent (12.04.24 @ 13:20) >    TRANSTHORACIC ECHOCARDIOGRAM REPORT  ________________________________________________________________________________                                      _______       Pt. Name:       NAN ASCENCIO Study Date:    12/4/2024  MRN:            [id number]    YOB: 1926  Accession #:    [id number]    Age:           89 years  Account#:       [id number]   Gender:        M  Heart Rate:                  Height:        66.14 in (168.00 cm)  Rhythm:                      Weight:        160.93 lb(73.00 kg)  Blood Pressure: 92/53 mmHg   BSA/BMI:       1.83 m² / 25.86 kg/m²  ________________________________________________________________________________________  Referring Physician:    Ryan Ibrahim MD  Interpreting Physician: Kiran Calle  Primary Sonographer:    Cristina Barth RDCS    CPT:               ECHO TTE WO CON COMP W DOPP - 11679.m  Indication(s):     Abnormal electrocardiogram ECG EKG - R94.31  Procedure:         Transthoracic echocardiogram with 2-D, M-mode and complete                     spectral and color flow Doppler.  Ordering Location: [facility]  Admission Status:  Inpatient  Study Information: Image quality for this study is technically difficult.    _______________________________________________________________________________________     CONCLUSIONS:      1. Technically difficult image quality.   2. Abnormal (paradoxical) septal motion consistent with conduction delay. Left ventricular systolic function is normal with an ejection fraction of 66 % by Hernandez's method of disks.   3. Normal right ventricular cavity size and normal right ventricular systolic function.   4. Left atrium is severely dilated.   5. Bioprosthetic valve present. in the mitral position. Mean gradient across the mitral valve is 11mmHg at a heart rate of 80 beats/min, which is elevated for this valve type in this position. This may be contaminated by lack of AV synchrony and moderate MR. Prosthetic stenosis is not excluded. Pressure half time is suggestive of a normally functioning prosthesis.   6. Moderate mitral regurgitation.   7. A bioprosthetic valve replacement Transcatheter deployed (TAVR) valve replacement is present in the aortic position The prosthetic valve is well seated.   8. Mild tricuspid regurgitation.   9. Estimated pulmonary artery systolic pressure is 46 mmHg, consistent with mild pulmonary hypertension.    ________________________________________________________________________________________  FINDINGS:     Left Ventricle:  Abnormal (paradoxical) septal motion consistent with conduction delay. Left ventricular systolic function is normal with a calculated ejection fraction of 66 % by the Hernandez's biplane method of disks.     Right Ventricle:  The right ventricular cavity is normal in size and right ventricular systolic function is normal. A device lead is visualized in the right heart.     Left Atrium:  The left atrium is severely dilated with an indexed volume of 52.50 ml/m².     Right Atrium:  The right atrium is normal in size with an indexed volume of 27.32 ml/m².     Aortic Valve:  A bioprosthetic valve replacement is present in the aortic position. A a transcatheter deployed (TAVR) is present in the aortic position. The prosthetic valve is well seated.     Mitral Valve:  Bioprosthetic valve is present in the mitral position. Mean gradient across the mitral valve is 11mmHg at a heart rate of 80 beats/min, which is elevated for this valve type in this position. This may be contaminated by lack of AV synchrony and moderate MR. Prosthetic stenosis is not excluded. Pressure half time is suggestive of a normally functioning prosthesis. There is moderate mitral regurgitation.     Tricuspid Valve:  Structurally normal tricuspid valve with normal leaflet excursion. There is mild tricuspid regurgitation. Estimated pulmonary artery systolic pressure is 46 mmHg, consistent with mild pulmonary hypertension.     Pulmonic Valve:  The pulmonic valve was not well visualized.     Pericardium:  No pericardial effusion seen.     Systemic Veins:  The inferior vena cava is normal in size (normal <2.1cm) with normal inspiratory collapse (normal >50%) consistent with normal right atrial pressure (~3, range 0-5mmHg).  ____________________________________________________________________  QUANTITATIVE DATA:  Left Ventricle Measurements: (Indexed to BSA)     LV Vol d, MOD A2C: 68.6 ml 37.56 ml/m²  LV Vol d, MOD A4C: 96.9 ml 53.05 ml/m²  LV Vol d, MOD BP:  82.2 ml 44.99 ml/m²  LV Vol s, MOD A2C: 20.3 ml 11.11 ml/m²  LV Vol s, MOD A4C: 34.9ml 19.11 ml/m²  LV Vol s, MOD BP:  27.9 ml 15.27 ml/m²  LVOT SV MOD BP:    54.3 ml  LV EF% MOD BP:     66 %     MV E Vmax:    2.14 m/s  MV A Vmax:    1.87 m/s  MV E/A:       1.14  e' lateral:   9.03 cm/s  e' medial:    4.57 cm/s  E/e' lateral: 23.70  E/e' medial:  46.83  E/e' Average: 31.47  MV DT:        255 msec       Left Atrium Measurements: (Indexed to BSA)  LA Diam 2D: 3.90 cm         Right Ventricle Measurements: Right Atrial Measurements:     RV Base (RVID1):  3.4 cm      RA Vol:  49.90 ml  RV Mid (RVID2):   3.6 cm      RA Vol s, MOD A4C  49.9 ml  RV Major (RVID3): 7.7 cm      RA Vol Index:      27.32 ml/m²                                RA Area s, MOD A4C 18.1 cm²       LVOT / RVOT/ Qp/Qs Data: (Indexed to BSA)  LVOT Diameter:  1.50 cm  LVOT Area:      1.77 cm²  LVOT Vmax:      1.97 m/s  LVOT Vmn:       1.350 m/s  LVOT VTI:       39.10 cm  LVOT peak grad: 16 mmHg  LVOT mean grad: 7.5 mmHg  LVOT SV:        69.1 ml   37.83 ml/m²    Aortic Valve Measurements:  AV Vmax:           2.4 m/s  AV Peak Gradient:       23.8 mmHg  AV Mean Gradient:       13.0 mmHg  AV VTI:                 47.2 cm  AV VTI Ratio:           0.83  AoV EOA, Contin:        1.46 cm²  AoV EOA, Contin i:      0.80 cm²/m²  AoV Dimensionless Index 0.83    Mitral Valve Measurements:     MV Vmax:      2.25 m/s     MR Vmax:          4.93 m/s  MV VTI, blanca   0.686 m      MR Peak Gradient: 97.2 mmHg  MV Mean Grad: 11.0 mmHg  MV Peak Grad: 20.2 mmHg  MV E Vmax:    2.1 m/s  MV A Vmax:    1.9 m/s  MV E/A:      1.1       Tricuspid Valve Measurements:     TR Vmax:          3.3 m/s  TR Peak Gradient: 43.3 mmHg  RA Pressure:      3 mmHg  PASP:             46 mmHg    ________________________________________________________________________________________  Electronically signed on 12/4/2024 at 5:42:23 PM by Kiran Calle         *** Final ***    < end of copied text >

## 2024-12-05 NOTE — PROGRESS NOTE ADULT - SUBJECTIVE AND OBJECTIVE BOX
Patient is a 89y old  Male who presents with a chief complaint of symptomatic bradycardia, YANETH, hyperkalemia (05 Dec 2024 01:35)      INTERVAL HPI/OVERNIGHT EVENTS:   No acute overnight events. Early AM patient did not have good capture on his pacer, EP evaluated and is not concerned at this time, active discussion in regards to next steps in patient care given age, and overall status    ICU Vital Signs Last 24 Hrs  T(C): 37.2 (05 Dec 2024 07:38), Max: 37.6 (05 Dec 2024 00:11)  T(F): 99 (05 Dec 2024 07:38), Max: 99.7 (05 Dec 2024 00:11)  HR: 79 (05 Dec 2024 10:00) (60 - 87)  BP: 113/58 (05 Dec 2024 10:00) (83/48 - 150/84)  BP(mean): 73 (05 Dec 2024 10:00) (57 - 112)  ABP: --  ABP(mean): --  RR: 28 (05 Dec 2024 10:00) (18 - 38)  SpO2: 94% (05 Dec 2024 10:00) (83% - 100%)    O2 Parameters below as of 05 Dec 2024 10:00  Patient On (Oxygen Delivery Method): nasal cannula  O2 Flow (L/min): 5        I&O's Summary    04 Dec 2024 07:01  -  05 Dec 2024 07:00  --------------------------------------------------------  IN: 3990 mL / OUT: 623 mL / NET: 3367 mL    05 Dec 2024 07:01  -  05 Dec 2024 11:09  --------------------------------------------------------  IN: 225 mL / OUT: 110 mL / NET: 115 mL          LABS:                        9.1    11.16 )-----------( 169      ( 05 Dec 2024 05:30 )             26.5     12-05    136  |  100  |  54[H]  ----------------------------<  243[H]  4.3   |  32[H]  |  3.50[H]    Ca    9.6      05 Dec 2024 05:30  Phos  3.7     12-05  Mg     2.2     12-05    TPro  6.0  /  Alb  3.3  /  TBili  0.7  /  DBili  x   /  AST  36  /  ALT  37  /  AlkPhos  44  12-05      Urinalysis Basic - ( 05 Dec 2024 05:30 )    Color: x / Appearance: x / SG: x / pH: x  Gluc: 243 mg/dL / Ketone: x  / Bili: x / Urobili: x   Blood: x / Protein: x / Nitrite: x   Leuk Esterase: x / RBC: x / WBC x   Sq Epi: x / Non Sq Epi: x / Bacteria: x      CAPILLARY BLOOD GLUCOSE      POCT Blood Glucose.: 233 mg/dL (05 Dec 2024 06:36)  POCT Blood Glucose.: 238 mg/dL (05 Dec 2024 05:09)  POCT Blood Glucose.: 247 mg/dL (04 Dec 2024 23:16)  POCT Blood Glucose.: 224 mg/dL (04 Dec 2024 17:50)    ABG - ( 04 Dec 2024 14:28 )  pH, Arterial: 7.40  pH, Blood: x     /  pCO2: 32    /  pO2: 63    / HCO3: 20    / Base Excess: -5.0  /  SaO2: 93.8                RADIOLOGY & ADDITIONAL TESTS:    Consultant(s) Notes Reviewed:  [x ] YES  [ ] NO    MEDICATIONS  (STANDING):  aspirin  chewable 81 milliGRAM(s) Oral daily  atorvastatin 80 milliGRAM(s) Oral at bedtime  ceFAZolin   IVPB 2000 milliGRAM(s) IV Intermittent once  chlorhexidine 2% Cloths 1 Application(s) Topical <User Schedule>  dextrose 5%. 1000 milliLiter(s) (50 mL/Hr) IV Continuous <Continuous>  dextrose 5%. 1000 milliLiter(s) (100 mL/Hr) IV Continuous <Continuous>  dextrose 50% Injectable 25 Gram(s) IV Push once  dextrose 50% Injectable 12.5 Gram(s) IV Push once  dextrose 50% Injectable 25 Gram(s) IV Push once  glucagon  Injectable 1 milliGRAM(s) IntraMuscular once  heparin   Injectable 5000 Unit(s) SubCutaneous every 8 hours  insulin lispro (ADMELOG) corrective regimen sliding scale   SubCutaneous every 6 hours  lactated ringers. 1000 milliLiter(s) (75 mL/Hr) IV Continuous <Continuous>  sodium chloride 0.9%. 1000 milliLiter(s) (20 mL/Hr) IV Continuous <Continuous>    MEDICATIONS  (PRN):  dextrose Oral Gel 15 Gram(s) Oral once PRN Blood Glucose LESS THAN 70 milliGRAM(s)/deciliter  sodium chloride 0.9% lock flush 10 milliLiter(s) IV Push every 1 hour PRN Pre/post blood products, medications, blood draw, and to maintain line patency      PHYSICAL EXAM:  GENERAL:   HEAD:  Atraumatic, Normocephalic  EYES: PERRLA, conjunctiva and sclera clear  NECK: No JVD, Normal thyroid, no enlarged nodes  NERVOUS SYSTEM:  AO x 0, confused, intermittently agitated easily redirected   CHEST/LUNG: B/L good air entry; No rales, rhonchi, or wheezing  HEART: S1S2 normal, no S3, Regular rate and rhythm; No murmurs  ABDOMEN: Soft, Nontender, Nondistended; Bowel sounds present  EXTREMITIES:  2+ Peripheral Pulses, No clubbing, cyanosis, or edema  LYMPH: No lymphadenopathy noted  SKIN: No rashes or lesions    Care Discussed with Consultants/Other Providers [ x] YES  [ ] NO

## 2024-12-05 NOTE — DIETITIAN INITIAL EVALUATION ADULT - PROBLEM SELECTOR PLAN 8
Takes lisinopril and metoprolol-HOLD BB,CCB   - Hold lisinopril in setting of YANETH  - Hold metoprolol in setting of bradycardia

## 2024-12-05 NOTE — DIETITIAN INITIAL EVALUATION ADULT - OTHER INFO
89 year old male with PMHx CAD, AS s/p TAVR (2017), mitral valve replacement, DM2, bladder CA s/p TURBT (2023) admitted to the ICU for CHB requiring emergent TVP. Also found to have YANETH and hyperkalemia. Hyperkalemia cocktail given in the ED. Glucagon given to reverse effects of home Metoprolol.    Pt confused. S/p emergent TVP for AV Block. NPO for possible pacemaker placement. Previously on soft and bite sized, consistent carbohydrate, low K diet. Per RN small po intake 12/4 in setting of acute illness. GI wdl, fecal incontinence noted. Hx DM, HgbA1c 7.9%. Pta on glipizide, metformin, januvia. Recommend resuming po diet when medically feasible. Encourage po intake. Recommend glucerna supplement 8oz BID. Will remain available to obtain additional subjective nutrition related information & obtain food preferences.

## 2024-12-05 NOTE — CONSULT NOTE ADULT - ASSESSMENT
89 yo M with PMH HTN, CAD, HLD, DM2, mitral valve replacement, bladder cancer s/p TURBT (2023), Anemia , BPH, aortic stenosis s/p TAVR (2017), ulcerative colitis presents to the ED with 2 days of dizziness. Found to have heart block and given TVP. Due to prolonged bradycardic episode patient now with renal dysfunction. Palliative consulted to discuss GOC

## 2024-12-05 NOTE — PROGRESS NOTE ADULT - PROBLEM SELECTOR PLAN 8
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

## 2024-12-05 NOTE — DIETITIAN INITIAL EVALUATION ADULT - PROBLEM SELECTOR PLAN 1
Patient with symptomatic bradycardia on presentation, s/p atropine with some improvement for possible BB over dose   - EKG Marked sinus bradycardia HR 34 Left axis deviation Left bundle branch block --> varying degrees of  AV block with prolonged periods of high degree AVB   - S/P BB reversal with Glucagon.  - Started on TVP with interventional cards; 20/80   - Suspicion for BRASH syndrome due to bradycardia with associated abnormal renal findings --> pt on multiple nephrotoxic medications, including metformin, lisinopril, torsemide  - Hold all AV chase agents; hold home BB   -  EP Eval- Plan for PPM placement if stable.  - TTE (4/2024): LV appears grossly normal, LA mildly dilated, hx of TAVR and MV replacement, mild MR   - Obtain TTE   - Trop elevation likely 2/2 demand ischemia, YANETH, electrolyte disturbances; trending to peak   - Obtain thyroid studies   - Cardiology consulted (Dr. Ibrahim), recs appreciated  - Interventional cards consulted, recs appreciated   - Plan per ICU

## 2024-12-05 NOTE — PROGRESS NOTE ADULT - SUBJECTIVE AND OBJECTIVE BOX
[facility]                                                       Dr. Juarez, Dr. Rowe, Dr. Carter, Dr. Pierre, Dr. Hoyos, Dr. Reynoso                                      [facility], [facility], [address]                                                 [address] [address]                                      Ph: [phone]  Fax: [phone]                                         Ph: [phone]  Fax: [phone]      Patient is a 89y old  Male who presents with a chief complaint of symptomatic bradycardia, YANETH, hyperkalemia (05 Dec 2024 13:56)  Patient seen in follow up for YANETH, Hyperkalemia.        PAST MEDICAL HISTORY:  Diabetes    HTN (hypertension)    CAD (coronary artery disease)    HLD (hyperlipidemia)    Bladder cancer    Aortic stenosis    Anemia of chronic disease      MEDICATIONS  (STANDING):  aspirin  chewable 81 milliGRAM(s) Oral daily  atorvastatin 80 milliGRAM(s) Oral at bedtime  chlorhexidine 2% Cloths 1 Application(s) Topical <User Schedule>  dextrose 5%. 1000 milliLiter(s) (50 mL/Hr) IV Continuous <Continuous>  dextrose 5%. 1000 milliLiter(s) (100 mL/Hr) IV Continuous <Continuous>  dextrose 50% Injectable 25 Gram(s) IV Push once  dextrose 50% Injectable 12.5 Gram(s) IV Push once  dextrose 50% Injectable 25 Gram(s) IV Push once  glucagon  Injectable 1 milliGRAM(s) IntraMuscular once  heparin   Injectable 5000 Unit(s) SubCutaneous every 8 hours  insulin lispro (ADMELOG) corrective regimen sliding scale   SubCutaneous every 6 hours  lactated ringers Bolus 1000 milliLiter(s) IV Bolus once  lactated ringers. 1000 milliLiter(s) (75 mL/Hr) IV Continuous <Continuous>  sodium chloride 0.9%. 1000 milliLiter(s) (20 mL/Hr) IV Continuous <Continuous>    MEDICATIONS  (PRN):  dextrose Oral Gel 15 Gram(s) Oral once PRN Blood Glucose LESS THAN 70 milliGRAM(s)/deciliter  sodium chloride 0.9% lock flush 10 milliLiter(s) IV Push every 1 hour PRN Pre/post blood products, medications, blood draw, and to maintain line patency    T(C): 38.4 (12-05-24 @ 11:58), Max: 38.4 (12-05-24 @ 11:58)  HR: 79 (12-05-24 @ 13:00) (24 - 87)  BP: 105/52 (12-05-24 @ 13:00) (83/48 - 150/84)  RR: 30 (12-05-24 @ 13:00) (16 - 38)  SpO2: 92% (12-05-24 @ 13:00) (83% - 100%)  Wt(kg): --  I&O's Detail    04 Dec 2024 07:01  -  05 Dec 2024 07:00  --------------------------------------------------------  IN:    IV PiggyBack: 100 mL    IV PiggyBack: 250 mL    Lactated Ringers: 450 mL    Lactated Ringers Bolus: 1000 mL    Oral Fluid: 390 mL    Sodium Bicarbonate: 1800 mL  Total IN: 3990 mL    OUT:    Indwelling Catheter - Urethral (mL): 623 mL  Total OUT: 623 mL    Total NET: 3367 mL      05 Dec 2024 07:01  -  05 Dec 2024 15:02  --------------------------------------------------------  IN:    Lactated Ringers: 300 mL  Total IN: 300 mL    OUT:    Indwelling Catheter - Urethral (mL): 110 mL  Total OUT: 110 mL    Total NET: 190 mL          PHYSICAL EXAM:  General: No distress  Respiratory: b/l air entry  Cardiovascular: S1 S2  Gastrointestinal: soft  Extremities:  no edema                              9.1    11.16 )-----------( 169      ( 05 Dec 2024 05:30 )             26.5     12-05    136  |  100  |  54[H]  ----------------------------<  243[H]  4.3   |  32[H]  |  3.50[H]    Ca    9.6      05 Dec 2024 05:30  Phos  3.7     12-05  Mg     2.2     12-05    TPro  6.0  /  Alb  3.3  /  TBili  0.7  /  DBili  x   /  AST  36  /  ALT  37  /  AlkPhos  44  12-05        LIVER FUNCTIONS - ( 05 Dec 2024 05:30 )  Alb: 3.3 g/dL / Pro: 6.0 g/dL / ALK PHOS: 44 U/L / ALT: 37 U/L / AST: 36 U/L / GGT: x           Urinalysis Basic - ( 05 Dec 2024 05:30 )    Color: x / Appearance: x / SG: x / pH: x  Gluc: 243 mg/dL / Ketone: x  / Bili: x / Urobili: x   Blood: x / Protein: x / Nitrite: x   Leuk Esterase: x / RBC: x / WBC x   Sq Epi: x / Non Sq Epi: x / Bacteria: x      ABG - ( 04 Dec 2024 14:28 )  pH, Arterial: 7.40  pH, Blood: x     /  pCO2: 32    /  pO2: 63    / HCO3: 20    / Base Excess: -5.0  /  SaO2: 93.8              Sodium, Serum: 136 (12-05 @ 05:30)  Sodium, Serum: 139 (12-05 @ 00:41)  Sodium, Serum: 138 (12-04 @ 18:50)  Sodium, Serum: 139 (12-04 @ 13:19)    Creatinine, Serum: 3.50 (12-05 @ 05:30)  Creatinine, Serum: 3.60 (12-05 @ 00:41)  Creatinine, Serum: 3.50 (12-04 @ 18:50)  Creatinine, Serum: 3.70 (12-04 @ 13:19)  Creatinine, Serum: 3.90 (12-04 @ 08:55)    Potassium, Serum: 4.3 (12-05 @ 05:30)  Potassium, Serum: 4.1 (12-05 @ 00:41)  Potassium, Serum: 4.6 (12-04 @ 18:50)  Potassium, Serum: 4.6 (12-04 @ 13:19)    Hemoglobin: 9.1 (12-05 @ 05:30)  Hemoglobin: 9.1 (12-04 @ 13:19)  Hemoglobin: 10.6 (12-04 @ 08:55)

## 2024-12-05 NOTE — DIETITIAN INITIAL EVALUATION ADULT - PERTINENT LABORATORY DATA
12-05    136  |  100  |  54[H]  ----------------------------<  243[H]  4.3   |  32[H]  |  3.50[H]    Ca    9.6      05 Dec 2024 05:30  Phos  3.7     12-05  Mg     2.2     12-05    TPro  6.0  /  Alb  3.3  /  TBili  0.7  /  DBili  x   /  AST  36  /  ALT  37  /  AlkPhos  44  12-05  POCT Blood Glucose.: 227 mg/dL (12-05-24 @ 11:25)  A1C with Estimated Average Glucose Result: 7.6 % (12-05-24 @ 05:30)  A1C with Estimated Average Glucose Result: 7.9 % (12-04-24 @ 18:50)  A1C with Estimated Average Glucose Result: 7.8 % (12-04-24 @ 13:19)

## 2024-12-05 NOTE — PROGRESS NOTE ADULT - PROBLEM SELECTOR PLAN 9
Takes metformin, glipizide, sitagliptin at home  - Hold home diabetic medications  - Start insulin sliding scale  - NPO, FS Q6H, hypoglycemia protocol  A1c 7.6 Takes lisinopril and metoprolol-HOLD BB,CCB   - Hold lisinopril in setting of YANETH  - Hold metoprolol in setting of bradycardia

## 2024-12-05 NOTE — PROGRESS NOTE ADULT - PROBLEM SELECTOR PLAN 5
Hx of NSTEMI, LBBB 04/2024   - As pt NPO, start ASA suppository #K 6.2   - S/P hyperkalemia protocol     #Mg 1.5  - S/P Mag Sulfate 2mg IVPB x 1

## 2024-12-05 NOTE — DISCHARGE NOTE PROVIDER - HOSPITAL COURSE
HPI:  89 yo M with PMH HTN, CAD, HLD, DM2, mitral valve replacement, bladder cancer s/p TURBT (2023), Anemia , BPH, aortic stenosis s/p TAVR (2017), ulcerative colitis presents to the ED with 2 days of dizziness. Patient is unable to provide history, history was mainly obtained from wife. Per wife, pt had generalized malaise for the past three days. No specific trigger for symptoms. Pt had associated dizziness, unsteadiness with ambulation, along with diaphoresis. Family denies changes in urinary frequency, at baseline has increased frequency due to history of bladder cancer. Pt did have some constipation for a few days, which resolved with a BM last night per wife.   Pt had seen his cardiologist at the end of November, Dr. Jacob, where the medication for his LE edema was changed from Lasix to Torsemide. Has had no issues with this medication so far.   Son reports med compliance may be an issue, thinks his dad could be mixing up pills including cardiac and DM meds which can be contributory to above.      ED Course:   Vitals: BP: 108/60, HR: 42 -> 30 -> 42 -> 24 -> 78, Temp: 97F, RR: 16, SpO2: 92% on RA --> 96% 3L NC    Labs:  K 6.2, BUN/Cr 48/3.9, Glucose 319, Hgb 10.6, Trop 91 proBNP 8310, Mag 1.5   ABG: pH: 7.33, PO2: 102, PCO2: 32, HCO3: 17, SpO2: 98%  UA: N/A   CXR: Heart magnified by technique. TAVR  aortic valve again noted.Slightly increased scattered interstitial markings the lungs possibly chronic are unchanged. 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 Chest is similar to April 17 this year. Follow-up AP chest on December 4, 2024 at 11:14 AM. A temporary pacer wire has been inserted from right jugular approach with tip in the right atrioventricular area. IMPRESSION: Temporary pacer wire inserted.  CT: N/A   EKG: Marked sinus bradycardia HR 34 Left axis deviation Left bundle branch block  Received in the ED: Atropine 1mg x 1, Calcium Chloride 500mg IVP x 1, Humalin 10u IVP x 1, Mag Sulfate 2g x 1, Lokelma 10mg x 1, Dextrose 50% 50cc x 1, NS 500cc bolus x 1, Albuterol neb    (04 Dec 2024 11:35)      ---  HOSPITAL COURSE: Patient admitted to medicine floor for management of bradycardia, hyperkalemia and YANETH. Patient was admitted to the ICU after presenting to the ED with symptomatic bradycardia, fatigue. Patient was treated for hyperkalemia in the ED which corrected to abherent labs value. Bradycardai is currently management with temporary pacer. Ongoing discussion with the family in regards to long term pacer are occuring. Patients mental status remains poor and not significantly improving at this time.    Pt seen and examined on day of discharge. Patient is medically optimized for discharge to home with close outpatient followup.    PHYSICAL EXAM ON DAY OF DISCHARGE:  The patient was seen and examined on the day of discharge. Please see progress note from day of discharge for further information.         ---  CONSULTANTS:     ---  TIME SPENT:  I, the attending physician, was physically present for the key portions of the evaluation and management (E/M) service provided. The total amount of time spent reviewing the hospital notes, laboratory values, imaging findings, assessing/counseling the patient, discussing with consultant physicians, social work, nursing staff was -- minutes    ---  Primary care provider was made aware of plan for discharge:      [  ] NO     [  ] YES   HPI:  89 yo M with PMH HTN, CAD, HLD, DM2, mitral valve replacement, bladder cancer s/p TURBT (2023), Anemia , BPH, aortic stenosis s/p TAVR (2017), ulcerative colitis presents to the ED with 2 days of dizziness. Patient is unable to provide history, history was mainly obtained from wife. Per wife, pt had generalized malaise for the past three days. No specific trigger for symptoms. Pt had associated dizziness, unsteadiness with ambulation, along with diaphoresis. Family denies changes in urinary frequency, at baseline has increased frequency due to history of bladder cancer. Pt did have some constipation for a few days, which resolved with a BM last night per wife.   Pt had seen his cardiologist at the end of November, Dr. Jacob, where the medication for his LE edema was changed from Lasix to Torsemide. Has had no issues with this medication so far.   Son reports med compliance may be an issue, thinks his dad could be mixing up pills including cardiac and DM meds which can be contributory to above.      ED Course:   Vitals: BP: 108/60, HR: 42 -> 30 -> 42 -> 24 -> 78, Temp: 97F, RR: 16, SpO2: 92% on RA --> 96% 3L NC    Labs:  K 6.2, BUN/Cr 48/3.9, Glucose 319, Hgb 10.6, Trop 91 proBNP 8310, Mag 1.5   ABG: pH: 7.33, PO2: 102, PCO2: 32, HCO3: 17, SpO2: 98%  UA: N/A   CXR: Heart magnified by technique. TAVR  aortic valve again noted.Slightly increased scattered interstitial markings the lungs possibly chronic are unchanged. 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 Chest is similar to April 17 this year. Follow-up AP chest on December 4, 2024 at 11:14 AM. A temporary pacer wire has been inserted from right jugular approach with tip in the right atrioventricular area. IMPRESSION: Temporary pacer wire inserted.  CT: N/A   EKG: Marked sinus bradycardia HR 34 Left axis deviation Left bundle branch block  Received in the ED: Atropine 1mg x 1, Calcium Chloride 500mg IVP x 1, Humalin 10u IVP x 1, Mag Sulfate 2g x 1, Lokelma 10mg x 1, Dextrose 50% 50cc x 1, NS 500cc bolus x 1, Albuterol neb    (04 Dec 2024 11:35)      ---  HOSPITAL COURSE: Patient admitted to medicine floor for management of bradycardia, hyperkalemia and YANETH. Patient was admitted to the ICU after presenting to the ED with symptomatic bradycardia, fatigue. Patient was treated for hyperkalemia in the ED which corrected to abherent labs value. Bradycardia managed with TVP in ICU. Family does not wanted PPM placement at this time. Family goals discussed with palliative care teams. Family wanted TVP to be stopped and allow the patient to be comfortable. Family did not wanted dialysis or blood draws. - family amenable to PRN medication for pain, anxiety, sob and are aware of unintended side effects including sedation and lowering of BP at this time so PRN dilaudid and ativan was ordered.      PHYSICAL EXAM ON DAY OF DISCHARGE:  The patient was seen and examined on the day of discharge. Please see progress note from day of discharge for further information.         ---  CONSULTANTS:     ---  TIME SPENT:  I, the attending physician, was physically present for the key portions of the evaluation and management (E/M) service provided. The total amount of time spent reviewing the hospital notes, laboratory values, imaging findings, assessing/counseling the patient, discussing with consultant physicians, social work, nursing staff was -- minutes    ---  Primary care provider was made aware of plan for discharge:      [  ] NO     [  ] YES

## 2024-12-05 NOTE — CONSULT NOTE ADULT - SUBJECTIVE AND OBJECTIVE BOX
HPI:  89 yo M with PMH HTN, CAD, HLD, DM2, mitral valve replacement, bladder cancer s/p TURBT (2023), Anemia , BPH, aortic stenosis s/p TAVR (2017), ulcerative colitis presents to the ED with 2 days of dizziness. Patient is unable to provide history, history was mainly obtained from wife. Per wife, pt had generalized malaise for the past three days. No specific trigger for symptoms. Pt had associated dizziness, unsteadiness with ambulation, along with diaphoresis. Family denies changes in urinary frequency, at baseline has increased frequency due to history of bladder cancer. Pt did have some constipation for a few days, which resolved with a BM last night per wife.   Pt had seen his cardiologist at the end of November, Dr. Jacob, where the medication for his LE edema was changed from Lasix to Torsemide. Has had no issues with this medication so far.   Son reports med compliance may be an issue, thinks his dad could be mixing up pills including cardiac and DM meds which can be contributory to above.      ED Course:   Vitals: BP: 108/60, HR: 42 -> 30 -> 42 -> 24 -> 78, Temp: 97F, RR: 16, SpO2: 92% on RA --> 96% 3L NC    Labs:  K 6.2, BUN/Cr 48/3.9, Glucose 319, Hgb 10.6, Trop 91 proBNP 8310, Mag 1.5   ABG: pH: 7.33, PO2: 102, PCO2: 32, HCO3: 17, SpO2: 98%  UA: N/A   CXR: Heart magnified by technique. TAVR  aortic valve again noted.Slightly increased scattered interstitial markings the lungs possibly chronic are unchanged. 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 Chest is similar to April 17 this year. Follow-up AP chest on December 4, 2024 at 11:14 AM. A temporary pacer wire has been inserted from right jugular approach with tip in the right atrioventricular area. IMPRESSION: Temporary pacer wire inserted.  CT: N/A   EKG: Marked sinus bradycardia HR 34 Left axis deviation Left bundle branch block  Received in the ED: Atropine 1mg x 1, Calcium Chloride 500mg IVP x 1, Humalin 10u IVP x 1, Mag Sulfate 2g x 1, Lokelma 10mg x 1, Dextrose 50% 50cc x 1, NS 500cc bolus x 1, Albuterol neb    (04 Dec 2024 11:35)    PERTINENT PM/SXH:   Diabetes    HTN (hypertension)    CAD (coronary artery disease)    HLD (hyperlipidemia)    Bladder cancer    Aortic stenosis    Anemia of chronic disease      No significant past surgical history    S/P TAVR (transcatheter aortic valve replacement)    Mitral valve replaced    History of transurethral resection of bladder tumor (TURBT)      FAMILY HISTORY:  No pertinent family history in first degree relatives      Family Hx substance abuse [ ]yes [ x]no  ITEMS NOT CHECKED ARE NOT PRESENT    SOCIAL HISTORY:   Significant other/partner[ x]  Children[x ]  Zoroastrianism/Spirituality:  Substance hx:  [ ]   Tobacco hx:  [ ]   Alcohol hx: [ ]   Home Opioid hx:  [ ] I-Stop Reference No:  Living Situation: [x ]Home  [ ]Long term care  [ ]Rehab [ ]Other    ADVANCE DIRECTIVES:    DNR/MOLST  [x ]  Living Will  [ ]   DECISION MAKER(s):  [ ] Health Care Proxy(s)  [x ] Surrogate(s)  [ ] Guardian           Name(s): Phone Number(s): spouse, Ariadna, number per EMR    BASELINE (I)ADL(s) (prior to admission):  Floyd: [ x]Total  [ ] Moderate [ ]Dependent    Allergies    simvastatin (Other)  Cipro (Other)  lovastatin (Other)  sulfa drugs (Other)    Intolerances    MEDICATIONS  (STANDING):  aspirin  chewable 81 milliGRAM(s) Oral daily  atorvastatin 80 milliGRAM(s) Oral at bedtime  chlorhexidine 2% Cloths 1 Application(s) Topical <User Schedule>  glucagon  Injectable 1 milliGRAM(s) IntraMuscular once  heparin   Injectable 5000 Unit(s) SubCutaneous every 8 hours  sodium chloride 0.9%. 1000 milliLiter(s) (20 mL/Hr) IV Continuous <Continuous>  sodium chloride 0.9%. 1000 milliLiter(s) (60 mL/Hr) IV Continuous <Continuous>    MEDICATIONS  (PRN):  bisacodyl Suppository 10 milliGRAM(s) Rectal daily PRN Constipation  HYDROmorphone  Injectable 0.5 milliGRAM(s) IV Push every 2 hours PRN dyspnea  HYDROmorphone  Injectable 0.5 milliGRAM(s) IV Push every 2 hours PRN Moderate Pain (4 - 6)  HYDROmorphone  Injectable 1 milliGRAM(s) IV Push every 2 hours PRN Severe Pain (7 - 10)  LORazepam   Injectable 0.5 milliGRAM(s) IV Push every 2 hours PRN anxiety, agitation, refractory dyspnea  sodium chloride 0.9% lock flush 10 milliLiter(s) IV Push every 1 hour PRN Pre/post blood products, medications, blood draw, and to maintain line patency    PRESENT SYMPTOMS: [ ]Unable to self-report  [ ] CPOT [ ] PAINADs [ ] RDOS  Source if other than patient:  [ ]Family   [ ]Team     Pain: [ ]yes [ x]no  QOL impact -   Location -                    Aggravating factors -  Quality -  Radiation -  Timing-  Severity (0-10 scale):  Minimal acceptable level (0-10 scale):     CPOT:    https://[email or web address]/getattachment/[email or web address]/Critical-Care-Pain-Observation-Tool-(CPOT)    PAIN AD Score:   http://geriatrictoolkit.[email or web address]/cog/painad.pdf (press ctrl +  left click to view)    Dyspnea:                           [ ]Mild [ ]Moderate [ ]Severe      RDOS:  0 to 2  minimal or no respiratory distress   3  mild distress  4 to 6 moderate distress  >7 severe distress  https://[email or web address]/handouts/hen/Respiratory_Distress_Observation_Scale.pdf (Ctrl +  left click to view)     Anxiety:                             [ ]Mild [ ]Moderate [ ]Severe  Fatigue:                             [ ]Mild [ ]Moderate [ ]Severe  Nausea:                             [ ]Mild [ ]Moderate [ ]Severe  Loss of appetite:              [ ]Mild [ ]Moderate [ ]Severe  Constipation:                    [ ]Mild [ ]Moderate [ ]Severe    PCSSQ[Palliative Care Spiritual Screening Question]   Severity (0-10):  Score of 4 or > indicate consideration of Chaplaincy referral.  Chaplaincy Referral: [ ] yes [ ] refused [ ] following [x ] Deferred     Caregiver [place]? : [ ] yes [ x] no [ ] Deferred [ ] Declined             Social work referral [ ] Patient & Family Centered Care Referral [ ]     Anticipatory Grief present?:  [ ] yes [x ] no  [ ] Deferred                  Social work referral [ ] Chaplaincy Referral[ ]      Other Symptoms:  [ x]All other review of systems negative     Palliative Performance Status Version 2:     30    %    http://[email or web address].org/files/news/palliative_performance_scale_ppsv2.pdf  PHYSICAL EXAM:  Vital Signs Last 24 Hrs  T(C): 38.4 (05 Dec 2024 11:58), Max: 38.4 (05 Dec 2024 11:58)  T(F): 101.2 (05 Dec 2024 11:58), Max: 101.2 (05 Dec 2024 11:58)  HR: 79 (05 Dec 2024 15:00) (60 - 81)  BP: 96/75 (05 Dec 2024 15:00) (72/45 - 143/97)  BP(mean): 82 (05 Dec 2024 15:00) (54 - 112)  RR: 33 (05 Dec 2024 15:00) (19 - 38)  SpO2: 94% (05 Dec 2024 15:00) (83% - 100%)    Parameters below as of 05 Dec 2024 12:00  Patient On (Oxygen Delivery Method): nasal cannula  O2 Flow (L/min): 5   I&O's Summary    04 Dec 2024 07:01  -  05 Dec 2024 07:00  --------------------------------------------------------  IN: 3990 mL / OUT: 623 mL / NET: 3367 mL    05 Dec 2024 07:01  -  05 Dec 2024 17:06  --------------------------------------------------------  IN: 1600 mL / OUT: 240 mL / NET: 1360 mL      GENERAL: [ ]Cachexia    [ x]Alert  [x ]Oriented x2   [ ]Lethargic  [ ]Unarousable  [ ]Verbal  [ ]Non-Verbal  Behavioral:   [ ] Anxiety  [ x] Delirium [ x] Agitation [ ] Other  HEENT:  [ ]Normal   [ x]Dry mouth   [ ]ET Tube/Trach  [ ]Oral lesions  PULMONARY:   [ ]Clear [ ]Tachypnea  [ ]Audible excessive secretions   [ ]Rhonchi        [ ]Right [ ]Left [ ]Bilateral  [ ]Crackles        [ ]Right [ ]Left [ ]Bilateral  [ ]Wheezing     [ ]Right [ ]Left [ ]Bilateral  [ x]Diminished breath sounds [ ]right [ ]left [ ]bilateral  CARDIOVASCULAR:  paced  [x ]Regular [ ]Irregular [ ]Tachy  [ ]Shorty [ ]Murmur [x ]Other  GASTROINTESTINAL:  [x ]Soft  [ ]Distended   [ ]+BS  [x ]Non tender [ ]Tender  [ ]Other [ ]PEG [ ]OGT/ NGT  Last BM:  GENITOURINARY:  [ ]Normal [x ] Incontinent   [ ]Oliguria/Anuria   [ ]Brush  MUSCULOSKELETAL:   [ ]Normal   [ x]Weakness  [ ]Bed/Wheelchair bound [ ]Edema  NEUROLOGIC:   [ ]No focal deficits  [ ]Cognitive impairment  [ ]Dysphagia [ ]Dysarthria [ ]Paresis [ x]Other   SKIN:   [ ]Normal  [ ]Rash  [ ]Other  [ ]Pressure ulcer(s)       Present on admission [ ]y [ ]n    CRITICAL CARE:  [ ] Shock Present  [ ]Septic [x ]Cardiogenic [ ]Neurologic [ ]Hypovolemic  [ ]  Vasopressors [ ]  Inotropes   [ ]Respiratory failure present [ ]Mechanical ventilation [ ]Non-invasive ventilatory support [ ]High flow    [ ]Acute  [ ]Chronic [ ]Hypoxic  [ ]Hypercarbic [ ]Other  [ ]Other organ failure     LABS:                        9.1    11.16 )-----------( 169      ( 05 Dec 2024 05:30 )             26.5   12-05    136  |  100  |  54[H]  ----------------------------<  243[H]  4.3   |  32[H]  |  3.50[H]    Ca    9.6      05 Dec 2024 05:30  Phos  3.7     12-05  Mg     2.2     12-05    TPro  6.0  /  Alb  3.3  /  TBili  0.7  /  DBili  x   /  AST  36  /  ALT  37  /  AlkPhos  44  12-05      Urinalysis Basic - ( 05 Dec 2024 05:30 )    Color: x / Appearance: x / SG: x / pH: x  Gluc: 243 mg/dL / Ketone: x  / Bili: x / Urobili: x   Blood: x / Protein: x / Nitrite: x   Leuk Esterase: x / RBC: x / WBC x   Sq Epi: x / Non Sq Epi: x / Bacteria: x      RADIOLOGY & ADDITIONAL STUDIES:  < from: TTE W or WO Ultrasound Enhancing Agent (12.04.24 @ 13:20) >    TRANSTHORACIC ECHOCARDIOGRAM REPORT  ________________________________________________________________________________                                      _______       Pt. Name:       NAN ASCENCIO Study Date:    12/4/2024  MRN:            [id number]    YOB: 1926  Accession #:    [id number]    Age:           89 years  Account#:       [id number]   Gender:        M  Heart Rate:                  Height:        66.14 in (168.00 cm)  Rhythm:                      Weight:        160.93 lb(73.00 kg)  Blood Pressure: 92/53 mmHg   BSA/BMI:       1.83 m² / 25.86 kg/m²  ________________________________________________________________________________________  Referring Physician:    Ryan Ibrahim MD  Interpreting Physician: Kiran Calle  Primary Sonographer:    Cristina Barth DARLENE    CPT:               ECHO TTE WO CON COMP W DOPP - 40006.m  Indication(s):     Abnormal electrocardiogram ECG EKG - R94.31  Procedure:         Transthoracic echocardiogram with 2-D, M-mode and complete                     spectral and color flow Doppler.  Ordering Location: [place] [facility]  Admission Status:  Inpatient  Study Information: Image quality for this study is technically difficult.    _______________________________________________________________________________________     CONCLUSIONS:      1. Technically difficult image quality.   2. Abnormal (paradoxical) septal motion consistent with conduction delay. Left ventricular systolic function is normal with an ejection fraction of 66 % by Hernandez's method of disks.   3. Normal right ventricular cavity size and normal right ventricular systolic function.   4. Left atrium is severely dilated.   5. Bioprosthetic valve present. in the mitral position. Mean gradient across the mitral valve is 11mmHg at a heart rate of 80 beats/min, which is elevated for this valve type in this position. This may be contaminated by lack of AV synchrony and moderate MR. Prosthetic stenosis is not excluded. Pressure half time is suggestive of a normally functioning prosthesis.   6. Moderate mitral regurgitation.   7. A bioprosthetic valve replacement Transcatheter deployed (TAVR) valve replacement is present in the aortic position The prosthetic valve is well seated.   8. Mild tricuspid regurgitation.   9. Estimated pulmonary artery systolic pressure is 46 mmHg, consistent with mild pulmonary hypertension.    ________________________________________________________________________________________  FINDINGS:     Left Ventricle:  Abnormal (paradoxical) septal motion consistent with conduction delay. Left ventricular systolic function is normal with a calculated ejection fraction of 66 % by the Hernandez's biplane method of disks.     Right Ventricle:  The right ventricular cavity is normal in size and right ventricular systolic function is normal. A device lead is visualized in the right heart.     Left Atrium:  The left atrium is severely dilated with an indexed volume of 52.50 ml/m².     Right Atrium:  The right atrium is normal in size with an indexed volume of 27.32 ml/m².     Aortic Valve:  A bioprosthetic valve replacement is present in the aortic position. A a transcatheter deployed (TAVR) is present in the aortic position. The prosthetic valve is well seated.     Mitral Valve:  Bioprosthetic valve is present in the mitral position. Mean gradient across the mitral valve is 11mmHg at a heart rate of 80 beats/min, which is elevated for this valve type in this position. This may be contaminated by lack of AV synchrony and moderate MR. Prosthetic stenosis is not excluded. Pressure half time is suggestive of a normally functioning prosthesis. There is moderate mitral regurgitation.     Tricuspid Valve:  Structurally normal tricuspid valve with normal leaflet excursion. There is mild tricuspid regurgitation. Estimated pulmonary artery systolic pressure is 46 mmHg, consistent with mild pulmonary hypertension.     Pulmonic Valve:  The pulmonic valve was not well visualized.     Pericardium:  No pericardial effusion seen.     Systemic Veins:  The inferior vena cava is normal in size (normal <2.1cm) with normal inspiratory collapse (normal >50%) consistent with normal right atrial pressure (~3, range 0-5mmHg).  ____________________________________________________________________  QUANTITATIVE DATA:  Left Ventricle Measurements: (Indexed to BSA)     LV Vol d, MOD A2C: 68.6 ml 37.56 ml/m²  LV Vol d, MOD A4C: 96.9 ml 53.05 ml/m²  LV Vol d, MOD BP:  82.2 ml 44.99 ml/m²  LV Vol s, MOD A2C: 20.3 ml 11.11 ml/m²  LV Vol s, MOD A4C: 34.9ml 19.11 ml/m²  LV Vol s, MOD BP:  27.9 ml 15.27 ml/m²  LVOT SV MOD BP:    54.3 ml  LV EF% MOD BP:     66 %     MV E Vmax:    2.14 m/s  MV A Vmax:    1.87 m/s  MV E/A:       1.14  e' lateral:   9.03 cm/s  e' medial:    4.57 cm/s  E/e' lateral: 23.70  E/e' medial:  46.83  E/e' Average: 31.47  MV DT:        255 msec       Left Atrium Measurements: (Indexed to BSA)  LA Diam 2D: 3.90 cm         Right Ventricle Measurements: Right Atrial Measurements:     RV Base (RVID1):  3.4 cm      RA Vol:  49.90 ml  RV Mid (RVID2):   3.6 cm      RA Vol s, MOD A4C  49.9 ml  RV Major (RVID3): 7.7 cm      RA Vol Index:      27.32 ml/m²                                RA Area s, MOD A4C 18.1 cm²       LVOT / RVOT/ Qp/Qs Data: (Indexed to BSA)  LVOT Diameter:  1.50 cm  LVOT Area:      1.77 cm²  LVOT Vmax:      1.97 m/s  LVOT Vmn:       1.350 m/s  LVOT VTI:       39.10 cm  LVOT peak grad: 16 mmHg  LVOT mean grad: 7.5 mmHg  LVOT SV:        69.1 ml   37.83 ml/m²    Aortic Valve Measurements:  AV Vmax:           2.4 m/s  AV Peak Gradient:       23.8 mmHg  AV Mean Gradient:       13.0 mmHg  AV VTI:                 47.2 cm  AV VTI Ratio:           0.83  AoV EOA, Contin:        1.46 cm²  AoV EOA, Contin i:      0.80 cm²/m²  AoV Dimensionless Index 0.83    Mitral Valve Measurements:     MV Vmax:      2.25 m/s     MR Vmax:          4.93 m/s  MV VTI, blanca   0.686 m      MR Peak Gradient: 97.2 mmHg  MV Mean Grad: 11.0 mmHg  MV Peak Grad: 20.2 mmHg  MV E Vmax:    2.1 m/s  MV A Vmax:    1.9 m/s  MV E/A:      1.1       Tricuspid Valve Measurements:     TR Vmax:          3.3 m/s  TR Peak Gradient: 43.3 mmHg  RA Pressure:      3 mmHg  PASP:             46 mmHg    ________________________________________________________________________________________  Electronically signed on 12/4/2024 at 5:42:23 PM by Kiran Calle         *** Final ***    < end of copied text >      PROTEIN CALORIE MALNUTRITION PRESENT: [ ]mild [ ]moderate [ ]severe [ ]underweight [ ]morbid obesity  https://www.andeal.org/vault/2440/web/files/ONC/Table_Clinical%20Characteristics%20to%20Document%20Malnutrition-White%20JV%20et%20al%444145.pdf    Height (cm): 172.7 (12-04-24 @ 12:00), 175.3 (04-17-24 @ 23:16)  Weight (kg): 68.4 (12-04-24 @ 12:00), 68 (04-17-24 @ 23:16)  BMI (kg/m2): 22.9 (12-04-24 @ 12:00), 22.1 (04-17-24 @ 23:16)    [x ]PPSV2 < or = to 30% [ ]significant weight loss  [ x]poor nutritional intake  [ ]anasarca[ ]Artificial Nutrition      Other REFERRALS:  [ ]Hospice  [ ]Child Life  [x ]Social Work  [ ]Case management [ ]Holistic Therapy

## 2024-12-05 NOTE — PROGRESS NOTE ADULT - PROBLEM SELECTOR PLAN 7
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

## 2024-12-05 NOTE — PROGRESS NOTE ADULT - PROBLEM SELECTOR PLAN 6
Takes atorvastatin 80mg QHS  - Hold in setting of NPO Hx of NSTEMI, LBBB 04/2024   - As pt NPO, start ASA suppository

## 2024-12-05 NOTE — DISCHARGE NOTE PROVIDER - NSDCMRMEDTOKEN_GEN_ALL_CORE_FT
aspirin 81 mg oral tablet: 1 dose(s) orally once a day  atorvastatin 80 mg oral tablet: 1 tab(s) orally once a day  glipiZIDE 10 mg oral tablet: 1 tab(s) orally 2 times a day  lisinopril 2.5 mg oral tablet: 1 tab(s) orally once a day  meclizine 12.5 mg oral tablet: 1 tab(s) orally once a day  metFORMIN 500 mg oral tablet: 1 tab(s) orally 2 times a day  metoprolol succinate 25 mg oral capsule, extended release: 1 cap(s) orally once a day  Plavix 75 mg oral tablet: 1 tab(s) orally once a day  SITagliptin (as base) 50 mg oral tablet: 1 tab(s) orally once a day  tamsulosin 0.4 mg oral capsule: 1 cap(s) orally once a day  torsemide 20 mg oral tablet: 1 tab(s) orally once a day

## 2024-12-05 NOTE — DIETITIAN INITIAL EVALUATION ADULT - NSICDXPASTMEDICALHX_GEN_ALL_CORE_FT
PAST MEDICAL HISTORY:  Anemia of chronic disease     Aortic stenosis     Bladder cancer     CAD (coronary artery disease)     Diabetes     HLD (hyperlipidemia)     HTN (hypertension)

## 2024-12-05 NOTE — CONSULT NOTE ADULT - PROBLEM SELECTOR RECOMMENDATION 4
discussed with family, bedside RN and ICU team.   will follow    Savannah Holder MD, [facility]; Palliative Care Attending, Ethicist. [phone]

## 2024-12-05 NOTE — DIETITIAN INITIAL EVALUATION ADULT - PROBLEM SELECTOR PLAN 4
#K 6.2   - S/P hyperkalemia protocol   - Repeat BMP per ICU    #Mg 1.5  - S/P Mag Sulfate 2mg IVPB x 1   - Repeat Mag per ICU

## 2024-12-05 NOTE — DISCHARGE NOTE PROVIDER - NSDCCPCAREPLAN_GEN_ALL_CORE_FT
PRINCIPAL DISCHARGE DIAGNOSIS  Diagnosis: Symptomatic bradycardia  Assessment and Plan of Treatment: Requiring temporary pacing with pacer wire      SECONDARY DISCHARGE DIAGNOSES  Diagnosis: Hyperkalemia  Assessment and Plan of Treatment:     Diagnosis: Symptomatic bradycardia  Assessment and Plan of Treatment:

## 2024-12-05 NOTE — DIETITIAN INITIAL EVALUATION ADULT - RD TO REMAIN AVAILABLE
Will remain available for additional subjective nutrition related information & to obtain food preferences as feasible./yes

## 2024-12-05 NOTE — CONSULT NOTE ADULT - PROBLEM SELECTOR RECOMMENDATION 9
s/p TVP placement  family electing against PPM at this time  to decide date/time for stopping/removal depending on mental status

## 2024-12-05 NOTE — PROGRESS NOTE ADULT - PROBLEM SELECTOR PLAN 2
BUN/Cr 48/3.9 -pre renal   - Baseline Cr ~1.5   - Suspect 2/2 various etiology, including dehydration, obstruction, and medication induced.   - Indwelling negron placed in due to significant retention; clear, yellow urine output  - S/P sodium bicarb gtt, now dced, improvement in lactic acidosis   - UA small LE, neg nitrites, WBC 20, mod bacteria   - Avoid nephrotoxic medications  - Nephro Dr. Juarez consulted, recs appreciated-Repeat BMP today & AM BUN/Cr 48/3.9 -pre renal  CKD 3-4   - Baseline Cr ~1.5   - Suspect 2/2 various etiology, including dehydration, obstruction, and medication induced.   - Indwelling negron placed in due to significant retention; clear, yellow urine output  - S/P sodium bicarb gtt, now dced, improvement in lactic acidosis   - UA small LE, neg nitrites, WBC 20, mod bacteria   - Avoid nephrotoxic medications  - Nephro Dr. Juarez consulted, recs appreciated-Repeat BMP today & AM

## 2024-12-05 NOTE — PROGRESS NOTE ADULT - PROBLEM SELECTOR PLAN 3
AMS 2/2 Metabolic encephalopathy ,   Luis Manuel, Bradycardia,   Supportive care  IVF,TVPW in plae  Hold home meds  Likely plan for palliative discontinuation of TVP AMS 2/2 Metabolic encephalopathy , improving  Luis Manuel, Bradycardia,   Supportive care  IVF,TVPW in plae  Hold home meds  Likely plan for palliative discontinuation of TVP

## 2024-12-05 NOTE — DIETITIAN INITIAL EVALUATION ADULT - PROBLEM SELECTOR PLAN 9
Takes metformin, glipizide, sitagliptin at home  - Hold home diabetic medications  - Start insulin sliding scale  - NPO, FS Q6H, hypoglycemia protocol  Follow HbA1C

## 2024-12-05 NOTE — PROGRESS NOTE ADULT - SUBJECTIVE AND OBJECTIVE BOX
Patient is a 89y old  Male who presents with a chief complaint of symptomatic bradycardia, YANETH, hyperkalemia (05 Dec 2024 17:05)    HPI:  89 yo M with PMH HTN, CAD, HLD, DM2, mitral valve replacement, bladder cancer s/p TURBT (2023), Anemia , BPH, aortic stenosis s/p TAVR (2017), ulcerative colitis presents to the ED with 2 days of dizziness. Patient is unable to provide history, history was mainly obtained from wife. Per wife, pt had generalized malaise for the past three days. No specific trigger for symptoms. Pt had associated dizziness, unsteadiness with ambulation, along with diaphoresis. Family denies changes in urinary frequency, at baseline has increased frequency due to history of bladder cancer. Pt did have some constipation for a few days, which resolved with a BM last night per wife.   Pt had seen his cardiologist at the end of November, Dr. Jacob, where the medication for his LE edema was changed from Lasix to Torsemide. Has had no issues with this medication so far.   Son reports med compliance may be an issue, thinks his dad could be mixing up pills including cardiac and DM meds which can be contributory to above.      ED Course:   Vitals: BP: 108/60, HR: 42 -> 30 -> 42 -> 24 -> 78, Temp: 97F, RR: 16, SpO2: 92% on RA --> 96% 3L NC    Labs:  K 6.2, BUN/Cr 48/3.9, Glucose 319, Hgb 10.6, Trop 91 proBNP 8310, Mag 1.5   ABG: pH: 7.33, PO2: 102, PCO2: 32, HCO3: 17, SpO2: 98%  UA: N/A   CXR: Heart magnified by technique. TAVR  aortic valve again noted.Slightly increased scattered interstitial markings the lungs possibly chronic are unchanged. 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 Chest is similar to April 17 this year. Follow-up AP chest on December 4, 2024 at 11:14 AM. A temporary pacer wire has been inserted from right jugular approach with tip in the right atrioventricular area. IMPRESSION: Temporary pacer wire inserted.  CT: N/A   EKG: Marked sinus bradycardia HR 34 Left axis deviation Left bundle branch block  Received in the ED: Atropine 1mg x 1, Calcium Chloride 500mg IVP x 1, Humalin 10u IVP x 1, Mag Sulfate 2g x 1, Lokelma 10mg x 1, Dextrose 50% 50cc x 1, NS 500cc bolus x 1, Albuterol neb    (04 Dec 2024 11:35)    INTERVAL HPI:  12/5: Patient evaluated at bedside, no acute overnight events. No plans for PPM any further, consideration for remove TVP; DNR/DNI       OVERNIGHT EVENTS: None.     Home Medications:  aspirin 81 mg oral tablet: 1 dose(s) orally once a day (04 Dec 2024 12:14)  atorvastatin 80 mg oral tablet: 1 tab(s) orally once a day (04 Dec 2024 12:14)  glipiZIDE 10 mg oral tablet: 1 tab(s) orally 2 times a day (04 Dec 2024 12:49)  lisinopril 2.5 mg oral tablet: 1 tab(s) orally once a day (04 Dec 2024 12:15)  meclizine 12.5 mg oral tablet: 1 tab(s) orally once a day (04 Dec 2024 12:49)  metFORMIN 500 mg oral tablet: 1 tab(s) orally 2 times a day (04 Dec 2024 12:49)  metoprolol succinate 25 mg oral capsule, extended release: 1 cap(s) orally once a day (04 Dec 2024 12:15)  Plavix 75 mg oral tablet: 1 tab(s) orally once a day (04 Dec 2024 12:14)  SITagliptin (as base) 50 mg oral tablet: 1 tab(s) orally once a day (04 Dec 2024 12:15)  tamsulosin 0.4 mg oral capsule: 1 cap(s) orally once a day (04 Dec 2024 12:15)  torsemide 20 mg oral tablet: 1 tab(s) orally once a day (04 Dec 2024 12:15)      MEDICATIONS  (STANDING):  aspirin  chewable 81 milliGRAM(s) Oral daily  atorvastatin 80 milliGRAM(s) Oral at bedtime  chlorhexidine 2% Cloths 1 Application(s) Topical <User Schedule>  glucagon  Injectable 1 milliGRAM(s) IntraMuscular once  heparin   Injectable 5000 Unit(s) SubCutaneous every 8 hours  sodium chloride 0.9%. 1000 milliLiter(s) (20 mL/Hr) IV Continuous <Continuous>  sodium chloride 0.9%. 1000 milliLiter(s) (60 mL/Hr) IV Continuous <Continuous>    MEDICATIONS  (PRN):  bisacodyl Suppository 10 milliGRAM(s) Rectal daily PRN Constipation  HYDROmorphone  Injectable 0.5 milliGRAM(s) IV Push every 2 hours PRN dyspnea  HYDROmorphone  Injectable 0.5 milliGRAM(s) IV Push every 2 hours PRN Moderate Pain (4 - 6)  HYDROmorphone  Injectable 1 milliGRAM(s) IV Push every 2 hours PRN Severe Pain (7 - 10)  LORazepam   Injectable 0.5 milliGRAM(s) IV Push every 2 hours PRN anxiety, agitation, refractory dyspnea  sodium chloride 0.9% lock flush 10 milliLiter(s) IV Push every 1 hour PRN Pre/post blood products, medications, blood draw, and to maintain line patency      Allergies    simvastatin (Other)  Cipro (Other)  lovastatin (Other)  sulfa drugs (Other)    Intolerances        Social History:  Tobacco Usage:  former smoker, quit 1988  Alcohol Usage: denies  Substance Use:  denies  Lives with: at home with family  Ambulates: with walker (04 Dec 2024 11:35)      REVIEW OF SYSTEMS:  ROS Unable to obtain due to - [  ] Dementia  [  ] Lethargy [  ] Drowsy [  ] Sedated [  ] non verbal [ x ] AMS   REST OF REVIEW Of SYSTEM - [  ] Normal     Vital Signs Last 24 Hrs  T(C): 37.7 (05 Dec 2024 15:00), Max: 38.4 (05 Dec 2024 11:58)  T(F): 99.8 (05 Dec 2024 15:00), Max: 101.2 (05 Dec 2024 11:58)  HR: 80 (05 Dec 2024 17:00) (60 - 81)  BP: 108/53 (05 Dec 2024 17:00) (72/45 - 143/97)  BP(mean): 77 (05 Dec 2024 17:00) (54 - 112)  RR: 25 (05 Dec 2024 17:00) (19 - 38)  SpO2: 93% (05 Dec 2024 17:00) (83% - 100%)    Parameters below as of 05 Dec 2024 17:00  Patient On (Oxygen Delivery Method): nasal cannula  O2 Flow (L/min): 5    Finger Stick        12-04 @ 07:01  -  12-05 @ 07:00  --------------------------------------------------------  IN: 3990 mL / OUT: 623 mL / NET: 3367 mL    12-05 @ 07:01 - 12-05 @ 18:41  --------------------------------------------------------  IN: 1600 mL / OUT: 290 mL / NET: 1310 mL        PHYSICAL EXAM:  GENERAL:  [X  ] NAD , [ X ] ill-appearing, [  ] Agitated, [  ] Drowsy,  [  ] Lethargy, [ X ] confused   HEAD:  [ X ] Normal, [  ] Other  EYES:  [  ] EOMI, [ X ] PERRLA, [  ] conjunctiva and sclera clear normal, [  ] Other,  [  ] Pallor,[  ] Discharge  ENMT:  [ X ] Normal, [  ] Moist mucous membranes, [  ] Good dentition, [  ] No Thrush  NECK:  [  ] Supple, [  ] No JVD, [  ] Normal thyroid, [  ] Lymphadenopathy [  ] Other  CHEST/LUNG:  [ X ] Clear to auscultation bilaterally, [  ] Breath Sounds equal B/L / Decrease, [  ] poor effort  [  ] No rales, [  ] No rhonchi  [  ]  No wheezing,   HEART:  [ X ] +S1S2 [  ] tachycardia, [  ] Bradycardia,  [  ] irregular  [  ] No murmurs, No rubs, No gallops, [  ] PPM in place (Mfr:  )  ABDOMEN:  [ X  ] Soft, [ X ] Nontender, [ X ] Nondistended, [  ] No mass, [X  ] Bowel sounds present, [  ] obese  NERVOUS SYSTEM:  [ X ] Alert & Oriented X0, [  ] Nonfocal  [ X ] Confusion  [  ] Encephalopathic [  ] Sedated [  ] Unable to assess, [  ] Dementia [  ] Other-  EXTREMITIES: [ X ] 2+ Peripheral Pulses, No clubbing, No cyanosis,  [  ] edema B/L lower EXT. [  ] PVD stasis skin changes B/L Lower EXT, [  ] wound  LYMPH: No lymphadenopathy noted  SKIN:  [ X ] No rashes or lesions, [  ] Pressure Ulcers, [  ] ecchymosis, [  ] Skin Tears, [  ] Other    DIET:     LABS:                        9.1    11.16 )-----------( 169      ( 05 Dec 2024 05:30 )             26.5     05 Dec 2024 05:30    136    |  100    |  54     ----------------------------<  243    4.3     |  32     |  3.50     Ca    9.6        05 Dec 2024 05:30  Phos  3.7       05 Dec 2024 05:30  Mg     2.2       05 Dec 2024 05:30    TPro  6.0    /  Alb  3.3    /  TBili  0.7    /  DBili  x      /  AST  36     /  ALT  37     /  AlkPhos  44     05 Dec 2024 05:30      Urinalysis Basic - ( 05 Dec 2024 05:30 )    Color: x / Appearance: x / SG: x / pH: x  Gluc: 243 mg/dL / Ketone: x  / Bili: x / Urobili: x   Blood: x / Protein: x / Nitrite: x   Leuk Esterase: x / RBC: x / WBC x   Sq Epi: x / Non Sq Epi: x / Bacteria: x                           Anemia Panel:      Thyroid Panel:  Thyroid Stimulating Hormone, Serum: 3.34 uIU/mL (12-04-24 @ 13:19)                RADIOLOGY & ADDITIONAL TESTS: < from: Xray Chest 1 View-PORTABLE IMMEDIATE (Xray Chest 1 View-PORTABLE IMMEDIATE .) (12.05.24 @ 07:34) >    ACC: [id number] EXAM:  XR CHEST PORTABLE IMMED 1V   ORDERED BY: RAJENDRA LIANG     PROCEDURE DATE:  12/05/2024          INTERPRETATION:  AP chest on December 5, 2024 at 7:16 AM. Patient had   complete heart block is being followed for temporary pacer.    Heart magnified by technique. TAVR aortic valve and temporary pacer wire   and into the right jugular area again noted.    There is an increasing retrocardiac infiltrate and a slightly increasing   central congestive picture compared to December4.    IMPRESSION: Slightly increasing central CHF and increasing retrocardiac   infiltrate. Right pacer wire again noted.    --- End of Report ---    < end of copied text >  < from: US Kidney and Bladder (12.04.24 @ 14:37) >    ACC: [id number] EXAM:  US KIDNEYS AND BLADDER   ORDERED BY: YUMIKO CARPENTER     PROCEDURE DATE:  12/04/2024          INTERPRETATION:  CLINICAL INFORMATION: Acute kidney injury, hyperkalemia    COMPARISON: None available.    TECHNIQUE: Sonography of the kidneys and bladder.    FINDINGS:  Technically difficult study, per technologist.    Right kidney: 8.4 cm. No renal mass, hydronephrosis or calculi.   Subcentimeter cyst.    Left kidney: 9.0 cm. No renal mass, hydronephrosis or calculi.    Urinary bladder: Collapsed around Brush.    IMPRESSION:  No hydronephrosis.        --- End of Report ---      < end of copied text >  < from: Xray Chest 1 View- PORTABLE-Urgent (12.04.24 @ 09:14) >  ACC: [id number] EXAM:  XR CHEST PORTABLE URGENT 1V   ORDERED BY: ESTEFANI HATCH     ACC: [id number] EXAM:  XR CHEST PORTABLE URGENT 1V   ORDERED BY:  RAMEZ SHEEHAN     PROCEDURE DATE:  12/04/2024          INTERPRETATION:  AP chest on December 4, 2024 at 9:05 AM. Patient has   chest pain and dizziness.    Heart magnified by technique. TAVR  aortic valve again noted.    Slightly increased scattered interstitial markings the lungs possibly   chronic are unchanged.    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    Chest is similar to April 17 this year.    Follow-up AP chest on December 4, 2024 at 11:14 AM.    A temporary pacer wire has been inserted from right jugular approach with   tip in the right atrioventricular area.    IMPRESSION: Temporary pacer wire inserted.    --- End of Report ---      < end of copied text >        HEALTH ISSUES - PROBLEM Dx:  Symptomatic bradycardia    YANETH (acute kidney injury)    Electrolyte disturbance    CAD (coronary artery disease)    HLD (hyperlipidemia)    HTN (hypertension)    Leg edema    T2DM (type 2 diabetes mellitus)    BPH (benign prostatic hyperplasia)    Need for prophylactic measure    Metabolic encephalopathy    Anemia    Symptomatic bradycardia    ACP (advance care planning)    Palliative care encounter            Consultant(s) Notes Reviewed:  [ X  ] YES     Care Discussed with [X] Consultants  [  ] Patient  [ X ] Family [  ] HCP [  ]   [  ] Social Service  [  ] RN, [  ] Physical Therapy,[X  ] Palliative care team  DVT PPX: [  ] Lovenox, [X  ] S C Heparin, [  ] Coumadin, [  ] Xarelto, [  ] Eliquis, [  ] Pradaxa, [  ] IV Heparin drip, [  ] SCD [  ] Contraindication 2 to GI Bleed,[  ] Ambulation [  ] Contraindicated 2 to  bleed [  ] Contraindicated 2 to Brain Bleed  Advanced directive: [  ] None, [ X ] DNR/DNI miriam Patient is a 89y old  Male who presents with a chief complaint of symptomatic bradycardia, YANETH, hyperkalemia (05 Dec 2024 17:05)    HPI:  89 yo M with PMH HTN, CAD, HLD, DM2, mitral valve replacement, bladder cancer s/p TURBT (2023), Anemia , BPH, aortic stenosis s/p TAVR (2017), ulcerative colitis presents to the ED with 2 days of dizziness. Patient is unable to provide history, history was mainly obtained from wife. Per wife, pt had generalized malaise for the past three days. No specific trigger for symptoms. Pt had associated dizziness, unsteadiness with ambulation, along with diaphoresis. Family denies changes in urinary frequency, at baseline has increased frequency due to history of bladder cancer. Pt did have some constipation for a few days, which resolved with a BM last night per wife.   Pt had seen his cardiologist at the end of November, Dr. Jacob, where the medication for his LE edema was changed from Lasix to Torsemide. Has had no issues with this medication so far.   Son reports med compliance may be an issue, thinks his dad could be mixing up pills including cardiac and DM meds which can be contributory to above.      ED Course:   Vitals: BP: 108/60, HR: 42 -> 30 -> 42 -> 24 -> 78, Temp: 97F, RR: 16, SpO2: 92% on RA --> 96% 3L NC    Labs:  K 6.2, BUN/Cr 48/3.9, Glucose 319, Hgb 10.6, Trop 91 proBNP 8310, Mag 1.5   ABG: pH: 7.33, PO2: 102, PCO2: 32, HCO3: 17, SpO2: 98%  UA: N/A   CXR: Heart magnified by technique. TAVR  aortic valve again noted.Slightly increased scattered interstitial markings the lungs possibly chronic are unchanged. 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 Chest is similar to April 17 this year. Follow-up AP chest on December 4, 2024 at 11:14 AM. A temporary pacer wire has been inserted from right jugular approach with tip in the right atrioventricular area. IMPRESSION: Temporary pacer wire inserted.  CT: N/A   EKG: Marked sinus bradycardia HR 34 Left axis deviation Left bundle branch block  Received in the ED: Atropine 1mg x 1, Calcium Chloride 500mg IVP x 1, Humalin 10u IVP x 1, Mag Sulfate 2g x 1, Lokelma 10mg x 1, Dextrose 50% 50cc x 1, NS 500cc bolus x 1, Albuterol neb    (04 Dec 2024 11:35)    INTERVAL HPI:  12/5: Patient evaluated at bedside, no acute overnight events. No plans for PPM any further, consideration for remove TVP; DNR/DNI       OVERNIGHT EVENTS: None.     Home Medications:  aspirin 81 mg oral tablet: 1 dose(s) orally once a day (04 Dec 2024 12:14)  atorvastatin 80 mg oral tablet: 1 tab(s) orally once a day (04 Dec 2024 12:14)  glipiZIDE 10 mg oral tablet: 1 tab(s) orally 2 times a day (04 Dec 2024 12:49)  lisinopril 2.5 mg oral tablet: 1 tab(s) orally once a day (04 Dec 2024 12:15)  meclizine 12.5 mg oral tablet: 1 tab(s) orally once a day (04 Dec 2024 12:49)  metFORMIN 500 mg oral tablet: 1 tab(s) orally 2 times a day (04 Dec 2024 12:49)  metoprolol succinate 25 mg oral capsule, extended release: 1 cap(s) orally once a day (04 Dec 2024 12:15)  Plavix 75 mg oral tablet: 1 tab(s) orally once a day (04 Dec 2024 12:14)  SITagliptin (as base) 50 mg oral tablet: 1 tab(s) orally once a day (04 Dec 2024 12:15)  tamsulosin 0.4 mg oral capsule: 1 cap(s) orally once a day (04 Dec 2024 12:15)  torsemide 20 mg oral tablet: 1 tab(s) orally once a day (04 Dec 2024 12:15)      MEDICATIONS  (STANDING):  aspirin  chewable 81 milliGRAM(s) Oral daily  atorvastatin 80 milliGRAM(s) Oral at bedtime  chlorhexidine 2% Cloths 1 Application(s) Topical <User Schedule>  glucagon  Injectable 1 milliGRAM(s) IntraMuscular once  heparin   Injectable 5000 Unit(s) SubCutaneous every 8 hours  sodium chloride 0.9%. 1000 milliLiter(s) (20 mL/Hr) IV Continuous <Continuous>  sodium chloride 0.9%. 1000 milliLiter(s) (60 mL/Hr) IV Continuous <Continuous>    MEDICATIONS  (PRN):  bisacodyl Suppository 10 milliGRAM(s) Rectal daily PRN Constipation  HYDROmorphone  Injectable 0.5 milliGRAM(s) IV Push every 2 hours PRN dyspnea  HYDROmorphone  Injectable 0.5 milliGRAM(s) IV Push every 2 hours PRN Moderate Pain (4 - 6)  HYDROmorphone  Injectable 1 milliGRAM(s) IV Push every 2 hours PRN Severe Pain (7 - 10)  LORazepam   Injectable 0.5 milliGRAM(s) IV Push every 2 hours PRN anxiety, agitation, refractory dyspnea  sodium chloride 0.9% lock flush 10 milliLiter(s) IV Push every 1 hour PRN Pre/post blood products, medications, blood draw, and to maintain line patency      Allergies    simvastatin (Other)  Cipro (Other)  lovastatin (Other)  sulfa drugs (Other)    Intolerances        Social History:  Tobacco Usage:  former smoker, quit 1988  Alcohol Usage: denies  Substance Use:  denies  Lives with: at home with family  Ambulates: with walker (04 Dec 2024 11:35)      REVIEW OF SYSTEMS:  ROS Unable to obtain due to - [  ] Dementia  [  ] Lethargy [  ] Drowsy [  ] Sedated [  ] non verbal [ x ] AMS   REST OF REVIEW Of SYSTEM - [  ] Normal     Vital Signs Last 24 Hrs  T(C): 37.7 (05 Dec 2024 15:00), Max: 38.4 (05 Dec 2024 11:58)  T(F): 99.8 (05 Dec 2024 15:00), Max: 101.2 (05 Dec 2024 11:58)  HR: 80 (05 Dec 2024 17:00) (60 - 81)  BP: 108/53 (05 Dec 2024 17:00) (72/45 - 143/97)  BP(mean): 77 (05 Dec 2024 17:00) (54 - 112)  RR: 25 (05 Dec 2024 17:00) (19 - 38)  SpO2: 93% (05 Dec 2024 17:00) (83% - 100%)    Parameters below as of 05 Dec 2024 17:00  Patient On (Oxygen Delivery Method): nasal cannula  O2 Flow (L/min): 5    Finger Stick        12-04 @ 07:01  -  12-05 @ 07:00  --------------------------------------------------------  IN: 3990 mL / OUT: 623 mL / NET: 3367 mL    12-05 @ 07:01 - 12-05 @ 18:41  --------------------------------------------------------  IN: 1600 mL / OUT: 290 mL / NET: 1310 mL        PHYSICAL EXAM:  GENERAL:  [X  ] NAD , [ X ] ill-appearing, [  ] Agitated, [  ] Drowsy,  [  ] Lethargy, [ X ] confused   HEAD:  [ X ] Normal, [  ] Other  EYES:  [  ] EOMI, [ X ] PERRLA, [  ] conjunctiva and sclera clear normal, [  ] Other,  [  ] Pallor,[  ] Discharge  ENMT:  [ X ] Normal, [  ] Moist mucous membranes, [  ] Good dentition, [  ] No Thrush  NECK:  [  ] Supple, [  ] No JVD, [  ] Normal thyroid, [  ] Lymphadenopathy [  ] Other  CHEST/LUNG:  [ X ] Clear to auscultation bilaterally, [  ] Breath Sounds equal B/L / Decrease, [  ] poor effort  [  ] No rales, [  ] No rhonchi  [  ]  No wheezing,   HEART:  [ X ] +S1S2 [  ] tachycardia, [  ] Bradycardia,  [  ] irregular  [  ] No murmurs, No rubs, No gallops, [  ] PPM in place (Mfr:  )  ABDOMEN:  [ X  ] Soft, [ X ] Nontender, [ X ] Nondistended, [  ] No mass, [X  ] Bowel sounds present, [  ] obese  NERVOUS SYSTEM:  [ X ] Alert & Oriented X0, [  ] Nonfocal  [ X ] Confusion  [  ] Encephalopathic [  ] Sedated [  ] Unable to assess, [  ] Dementia [  ] Other-  EXTREMITIES: [ X ] 2+ Peripheral Pulses, No clubbing, No cyanosis,  [  ] edema B/L lower EXT. [  ] PVD stasis skin changes B/L Lower EXT, [  ] wound  LYMPH: No lymphadenopathy noted  SKIN:  [ X ] No rashes or lesions, [  ] Pressure Ulcers, [  ] ecchymosis, [  ] Skin Tears, [  ] Other    DIET: soft    LABS:                        9.1    11.16 )-----------( 169      ( 05 Dec 2024 05:30 )             26.5     05 Dec 2024 05:30    136    |  100    |  54     ----------------------------<  243    4.3     |  32     |  3.50     Ca    9.6        05 Dec 2024 05:30  Phos  3.7       05 Dec 2024 05:30  Mg     2.2       05 Dec 2024 05:30    TPro  6.0    /  Alb  3.3    /  TBili  0.7    /  DBili  x      /  AST  36     /  ALT  37     /  AlkPhos  44     05 Dec 2024 05:30      Urinalysis Basic - ( 05 Dec 2024 05:30 )    Color: x / Appearance: x / SG: x / pH: x  Gluc: 243 mg/dL / Ketone: x  / Bili: x / Urobili: x   Blood: x / Protein: x / Nitrite: x   Leuk Esterase: x / RBC: x / WBC x   Sq Epi: x / Non Sq Epi: x / Bacteria: x                           Anemia Panel:      Thyroid Panel:  Thyroid Stimulating Hormone, Serum: 3.34 uIU/mL (12-04-24 @ 13:19)                RADIOLOGY & ADDITIONAL TESTS: < from: Xray Chest 1 View-PORTABLE IMMEDIATE (Xray Chest 1 View-PORTABLE IMMEDIATE .) (12.05.24 @ 07:34) >    ACC: [id number] EXAM:  XR CHEST PORTABLE IMMED 1V   ORDERED BY: RAJENDRA LIANG     PROCEDURE DATE:  12/05/2024          INTERPRETATION:  AP chest on December 5, 2024 at 7:16 AM. Patient had   complete heart block is being followed for temporary pacer.    Heart magnified by technique. TAVR aortic valve and temporary pacer wire   and into the right jugular area again noted.    There is an increasing retrocardiac infiltrate and a slightly increasing   central congestive picture compared to December4.    IMPRESSION: Slightly increasing central CHF and increasing retrocardiac   infiltrate. Right pacer wire again noted.    --- End of Report ---    < end of copied text >  < from: US Kidney and Bladder (12.04.24 @ 14:37) >    ACC: [id number] EXAM:  US KIDNEYS AND BLADDER   ORDERED BY: YUMIKO CARPENTER     PROCEDURE DATE:  12/04/2024          INTERPRETATION:  CLINICAL INFORMATION: Acute kidney injury, hyperkalemia    COMPARISON: None available.    TECHNIQUE: Sonography of the kidneys and bladder.    FINDINGS:  Technically difficult study, per technologist.    Right kidney: 8.4 cm. No renal mass, hydronephrosis or calculi.   Subcentimeter cyst.    Left kidney: 9.0 cm. No renal mass, hydronephrosis or calculi.    Urinary bladder: Collapsed around Brush.    IMPRESSION:  No hydronephrosis.        --- End of Report ---      < end of copied text >  < from: Xray Chest 1 View- PORTABLE-Urgent (12.04.24 @ 09:14) >  ACC: [id number] EXAM:  XR CHEST PORTABLE URGENT 1V   ORDERED BY: ESTEFANI HATCH     ACC: [id number] EXAM:  XR CHEST PORTABLE URGENT 1V   ORDERED BY:  RAMEZ SHEEHAN     PROCEDURE DATE:  12/04/2024          INTERPRETATION:  AP chest on December 4, 2024 at 9:05 AM. Patient has   chest pain and dizziness.    Heart magnified by technique. TAVR  aortic valve again noted.    Slightly increased scattered interstitial markings the lungs possibly   chronic are unchanged.    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    Chest is similar to April 17 this year.    Follow-up AP chest on December 4, 2024 at 11:14 AM.    A temporary pacer wire has been inserted from right jugular approach with   tip in the right atrioventricular area.    IMPRESSION: Temporary pacer wire inserted.    --- End of Report ---      < end of copied text >        HEALTH ISSUES - PROBLEM Dx:  Symptomatic bradycardia    YANETH (acute kidney injury)    Electrolyte disturbance    CAD (coronary artery disease)    HLD (hyperlipidemia)    HTN (hypertension)    Leg edema    T2DM (type 2 diabetes mellitus)    BPH (benign prostatic hyperplasia)    Need for prophylactic measure    Metabolic encephalopathy    Anemia    Symptomatic bradycardia    ACP (advance care planning)    Palliative care encounter            Consultant(s) Notes Reviewed:  [ X  ] YES     Care Discussed with [X] Consultants  [  ] Patient  [ X ] Family [  ] HCP [  ]   [  ] Social Service  [  ] RN, [  ] Physical Therapy,[X  ] Palliative care team  DVT PPX: [  ] Lovenox, [X  ] S C Heparin, [  ] Coumadin, [  ] Xarelto, [  ] Eliquis, [  ] Pradaxa, [  ] IV Heparin drip, [  ] SCD [  ] Contraindication 2 to GI Bleed,[  ] Ambulation [  ] Contraindicated 2 to  bleed [  ] Contraindicated 2 to Brain Bleed  Advanced directive: [  ] None, [ X ] DNR/DNI

## 2024-12-05 NOTE — CONSULT NOTE ADULT - TREATMENT GUIDELINE COMMENT
no blood draws, finger sticks or escalation of care  c/w TVP for now, family meeting tomorrow 12/6 at 12pm to discuss options further  family amenable to PRN medication for pain, anxiety, sob and are aware of unintended side effects including sedation and lowering of BP.

## 2024-12-05 NOTE — CONSULT NOTE ADULT - PROBLEM SELECTOR RECOMMENDATION 2
poor renal function but no critical electrolyte derangements  would never pursue dialysis given age/comorbidities  family electing for no further blood draws

## 2024-12-05 NOTE — CONSULT NOTE ADULT - PROBLEM SELECTOR RECOMMENDATION 3
DNR/DNI in place  no escalation of care, no blood draws, finger sticks  PRN medications for pain, sob, anxiety/agitation    family meeting tomorrow 12pm to discuss next steps in care  however, family aware patient is high risk for expiration at any time given critical state

## 2024-12-06 NOTE — PROGRESS NOTE ADULT - SUBJECTIVE AND OBJECTIVE BOX
[facility] [place] [facility] --  Luc Church Pannella, Patel, Savella, Cohen  Office # [phone]      Follow Up:  TAVR CAD CHB    Subjective/Observations: Patient seen and examined. Events noted. Resting  in bed. Unable to provide meaningful information.     REVIEW OF SYSTEMS: Limited 2/2 comorbidities     PAST MEDICAL & SURGICAL HISTORY:  Diabetes      HTN (hypertension)      CAD (coronary artery disease)      HLD (hyperlipidemia)      Bladder cancer      Aortic stenosis      Anemia of chronic disease      S/P TAVR (transcatheter aortic valve replacement)      Mitral valve replaced      History of transurethral resection of bladder tumor (TURBT)          MEDICATIONS  (STANDING):  aspirin  chewable 81 milliGRAM(s) Oral daily  atorvastatin 80 milliGRAM(s) Oral at bedtime  chlorhexidine 2% Cloths 1 Application(s) Topical <User Schedule>  glucagon  Injectable 1 milliGRAM(s) IntraMuscular once  heparin   Injectable 5000 Unit(s) SubCutaneous every 8 hours  sodium chloride 0.9%. 1000 milliLiter(s) (20 mL/Hr) IV Continuous <Continuous>  sodium chloride 0.9%. 1000 milliLiter(s) (60 mL/Hr) IV Continuous <Continuous>    MEDICATIONS  (PRN):  bisacodyl Suppository 10 milliGRAM(s) Rectal daily PRN Constipation  HYDROmorphone  Injectable 0.5 milliGRAM(s) IV Push every 2 hours PRN dyspnea  HYDROmorphone  Injectable 0.5 milliGRAM(s) IV Push every 2 hours PRN Moderate Pain (4 - 6)  HYDROmorphone  Injectable 1 milliGRAM(s) IV Push every 2 hours PRN Severe Pain (7 - 10)  LORazepam   Injectable 0.5 milliGRAM(s) IV Push every 2 hours PRN anxiety, agitation, refractory dyspnea  sodium chloride 0.9% lock flush 10 milliLiter(s) IV Push every 1 hour PRN Pre/post blood products, medications, blood draw, and to maintain line patency      Allergies    simvastatin (Other)  Cipro (Other)  lovastatin (Other)  sulfa drugs (Other)    Intolerances            Vital Signs Last 24 Hrs  T(C): 37.6 (06 Dec 2024 07:50), Max: 38.4 (05 Dec 2024 11:58)  T(F): 99.6 (06 Dec 2024 07:50), Max: 101.2 (05 Dec 2024 11:58)  HR: 93 (06 Dec 2024 10:00) (79 - 93)  BP: 145/93 (06 Dec 2024 10:00) (72/45 - 173/67)  BP(mean): 108 (06 Dec 2024 10:00) (54 - 113)  RR: 28 (06 Dec 2024 10:00) (20 - 33)  SpO2: 98% (06 Dec 2024 10:00) (89% - 99%)    Parameters below as of 06 Dec 2024 10:00  Patient On (Oxygen Delivery Method): mask, Venturi        I&O's Summary    05 Dec 2024 07:01  -  06 Dec 2024 07:00  --------------------------------------------------------  IN: 1600 mL / OUT: 935 mL / NET: 665 mL    06 Dec 2024 07:01  -  06 Dec 2024 10:18  --------------------------------------------------------  IN: 0 mL / OUT: 195 mL / NET: -195 mL          PHYSICAL EXAM:  TELE:   Constitutional: NAD, awake   HEENT: Moist Mucous Membranes, Anicteric  Pulmonary: Decreased breath sounds b/l. No rales, crackles or wheeze appreciated.   Cardiovascular: Regular, S1 and S2, No murmurs, rubs, gallops or clicks  Gastrointestinal: Bowel Sounds present, soft, nontender.   Lymph: No peripheral edema. No lymphadenopathy.  Skin: No visible rashes or ulcers.  Psych:  unable to assess     LABS: All Labs Reviewed:                        9.1    11.16 )-----------( 169      ( 05 Dec 2024 05:30 )             26.5                         9.1    10.39 )-----------( 178      ( 04 Dec 2024 13:19 )             27.3                         10.6   8.54  )-----------( 210      ( 04 Dec 2024 08:55 )             32.0     05 Dec 2024 05:30    136    |  100    |  54     ----------------------------<  243    4.3     |  32     |  3.50   05 Dec 2024 00:41    139    |  100    |  53     ----------------------------<  256    4.1     |  28     |  3.60   04 Dec 2024 18:50    138    |  102    |  53     ----------------------------<  221    4.6     |  27     |  3.50     Ca    9.6        05 Dec 2024 05:30  Ca    9.3        05 Dec 2024 00:41  Ca    9.6        04 Dec 2024 18:50  Phos  3.7       05 Dec 2024 05:30  Phos  2.5       05 Dec 2024 00:41  Phos  2.1       04 Dec 2024 18:50  Mg     2.2       05 Dec 2024 05:30  Mg     2.1       05 Dec 2024 00:41  Mg     1.7       04 Dec 2024 18:50    TPro  6.0    /  Alb  3.3    /  TBili  0.7    /  DBili  x      /  AST  36     /  ALT  37     /  AlkPhos  44     05 Dec 2024 05:30  TPro  7.0    /  Alb  3.5    /  TBili  0.7    /  DBili  x      /  AST  32     /  ALT  35     /  AlkPhos  50     04 Dec 2024 08:55        - Troponin: <-253.0, <-243.1, <-227.4, <-158.6, <-91.0

## 2024-12-06 NOTE — PROGRESS NOTE ADULT - PROBLEM SELECTOR PLAN 2
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   - Indwelling negron placed in due to significant retention; clear, yellow urine output  - S/P sodium bicarb gtt, now dced, improvement in lactic acidosis   - UA small LE, neg nitrites, WBC 20, mod bacteria   - Hold all treatment measures. Comfort measures only.   - Nephro Dr. 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   - Indwelling negron placed in due to significant retention; clear, yellow urine output  - S/P sodium bicarb gtt, now dced, improvement in lactic acidosis -Off IVF as CMO  - Hold all treatment measures. Comfort measures only.   - Nephro Dr. Juarez follow up-no further Rx

## 2024-12-06 NOTE — DISCHARGE NOTE FOR THE EXPIRED PATIENT - HOSPITAL COURSE
89 year old male with PMHx CAD, AS s/p TAVR (2017), DM2, bladder CA s/p TURBT (2023) admitted to the ICU with CHB requiring emergent TVP with BRASH syndrome. EMS noted HR in the 20s and was given atropine x1 PTA. Found to have varying degrees of AV block with prolonged periods of high degree AVB with HR in the 30-40s. TVP placed emergently by EP. Was given glucagon x1 to reverse metoprolol effects, along with hyperkalemia cocktail x1 and calcium IV (K+ POA 6.5). Goals of care conversation wad made with family at bedside. Patient was made DNR/DNI with no wishes to pursue dialysis. Patient's wishes confirmed with healthcare proxy (wife, Jenn Archibald) at bedside. [facility]AST in chart. The decision was eventually made to not pursue a permanent pacemaker as they felt that his current state is not the quality of life he would find acceptable. A decision was made with palliative care in regards to the date/time to turn off the TVP.     Patient's family had another meeting with palliative the following day at 12 PM. TVP turned off with family in the unit.  Was found to have asystole on monitor. Dr. Plata at bedside. Patient examined with no palpable pulses at carotid and femoral locations, no spontaneous breath sounds were auscultated via stethoscope. There were no heart sounds auscultated via stethoscope at left and right sternal boarders as well at PMI, skin was cyanotic and cool and pupils were fixed and dilated without reaction to light. Pt was pronounced dead at 18:18. Emotional support offered.

## 2024-12-06 NOTE — PROGRESS NOTE ADULT - PROBLEM SELECTOR PLAN 3
AMS 2/2 Metabolic encephalopathy , improving  Luis Manuel, Bradycardia,   Supportive care  IVF,TVPW in plae  Hold home meds  Plan for palliative discontinuation of TVP; comfort measures in place

## 2024-12-06 NOTE — PROGRESS NOTE ADULT - ATTENDING COMMENTS
89M with CAD, AS s/p TAVR, mitral valve replacement T2DM, bladder cancer s/p TURBT who presents with CHB s/p TVP with BRASH syndrome.     AT this time family would not like to pursue PPM placement.   Palliative care consulted. Pt is DNR/DNI.  TVP discontinued at 1PM  Continue comfort measures only     Plan d/w family at bedside
89M with CAD, AS s/p TAVR, mitral valve replacement T2DM, bladder cancer s/p TURBT who presents with CHB s/p TVP with BRASH syndrome.     AT this time family would not like to pursue PPM placement.   Palliative care consulted. Pt is DNR/DNI.  Trial of IVF for hypovolemia.  If TVP loses capture there is no role for adjustment given pt GOC.    PLan d/w cardiology and family at bedside.
89 yo M with PMH HTN, CAD, HLD, DM2, mitral valve replacement, bladder cancer s/p TURBT (2023), BPH, aortic stenosis s/p TAVR (2017), ulcerative colitis presents to the ED with 2 days of dizziness. Admitted for symptomatic bradycardia requiring emergent  transvenous pacing for CHB  with  YANETH , Hyperkalemia - electrolyte disturbances, metabolic encephalopathy EP Eval for possible PPM placement.  Pt seen, Examined, case & care plan d/w  , residents at detail.  Consults:  Cardiology-Dr Connors follow up-CMO  ICU Dr Plata & team- no work for fever CMO  Renal-DR Juarez d/w -No HD No-IVF on CMO  Palliative care team d/w -pt on Comfort care ,No labs   SCD  Prognosis Guarded -poor .   Total care time is 40 minutes.
89 yo M with PMH HTN, CAD, HLD, DM2, mitral valve replacement, bladder cancer s/p TURBT (2023), BPH, aortic stenosis s/p TAVR (2017), ulcerative colitis presents to the ED with 2 days of dizziness. Admitted for symptomatic bradycardia requiring emergent  transvenous pacing for CHB  with  YANETH , Hyperkalemia - electrolyte disturbances, metabolic encephalopathy EP Eval for possible PPM placement.  Pt seen, Examined, case & care plan d/w  , residents at detail.  Consults:  Cardiology-Dr Calle follow up  ICU Dr Plata & team- no work for fever   Renal-DR Juarez d/w -No HD -IVF   EP team-DR Thorne   Palliative care team d/w -plan for Comfort care ,No labs   Po diet   SCD  Prognosis Guarded .   Total care time is 45 minutes.

## 2024-12-06 NOTE — PROGRESS NOTE ADULT - PROBLEM SELECTOR PLAN 1
Patient with symptomatic bradycardia on presentation, s/p atropine with some improvement for possible BB over dose   - EKG Marked sinus bradycardia HR 34 Left axis deviation Left bundle branch block --> varying degrees of  AV block with prolonged periods of high degree AVB   - S/P BB reversal with Glucagon.  - Started on TVP with interventional cards; initially 20/80   - Suspicion for BRASH syndrome due to bradycardia with associated abnormal renal findings --> pt on multiple nephrotoxic medications, including metformin, lisinopril, torsemide  - Hold all AV chase agents; hold home BB   - EP Eval-  Initialy plan for PPM placement if stable.  - TTE (4/2024): LV appears grossly normal, LA mildly dilated, hx of TAVR and MV replacement, mild MR   - TTE 12/5:  ejection fraction of 66 %, Bioprosthetic valve present. in the mitral position. A bioprosthetic valve replacement Transcatheter deployed (TAVR) valve replacement is present in the aortic position  - Trop elevation likely 2/2 demand ischemia, YANETH, electrolyte disturbances; trending to peak   - Plan to discontinue TVP after GOC discussion with palliative team; no further PPM placement  - Likely discontinuation of TVP 12/6; comfort measures in place   - Cardiology consulted (Dr. Ibrahim), recs appreciated  - Interventional cards consulted, recs appreciated   - Plan per ICU Patient with symptomatic bradycardia on presentation, s/p atropine with some improvement for possible BB over dose   - EKG Marked sinus bradycardia HR 34 Left axis deviation Left bundle branch block --> varying degrees of  AV block with prolonged periods of high degree AVB   - S/P BB reversal with Glucagon.  - Started on TVP with interventional cards; initially 20/80   - Suspicion for BRASH syndrome due to bradycardia with associated abnormal renal findings --> pt on multiple nephrotoxic medications, including metformin, lisinopril, torsemide  - Hold all AV chase agents; hold home BB   - TTE (4/2024): LV appears grossly normal, LA mildly dilated, hx of TAVR and MV replacement, mild MR   - TTE 12/5:  ejection fraction of 66 %, Bioprosthetic valve present. in the mitral position. A bioprosthetic valve replacement Transcatheter deployed (TAVR) valve replacement is present in the aortic position  - Trop elevation likely 2/2 demand ischemia, YANETH, electrolyte disturbances; trending to peak   - Plan to discontinue TVP after GOC discussion with palliative team; no further PPM placement  - Likely discontinuation of TVP 12/6; comfort measures in place   - Cardiology follow up Dr anaya  - Plan per ICU-o de-activate today  no plan for PPM  prior to discontinuation, will need significant medication as will lose pump for circulation  prior to discontinuation, will give IV Dilaudid and IV versed.

## 2024-12-06 NOTE — PROGRESS NOTE ADULT - CRITICAL CARE ATTENDING COMMENT
Upon my evaluation, this patient is at high risk for imminent or life threatening deterioration due to bradycardia,  and other active medical issues which require my direct attention, intervention, and personal management.  I have personally spent >35 minutes  of critical care time exclusive of time spent on separate billing procedures. This includes review of laboratory data, radiology results, discussion with primary team\patient, and monitoring for potential decompensation Interventions were performed as documented above.
Upon my evaluation, this patient is at high risk for imminent or life threatening deterioration due to bradycardia,  and other active medical issues which require my direct attention, intervention, and personal management.  I have personally spent >35 minutes  of critical care time exclusive of time spent on separate billing procedures. This includes review of laboratory data, radiology results, discussion with primary team\patient, and monitoring for potential decompensation Interventions were performed as documented above.

## 2024-12-06 NOTE — PROGRESS NOTE ADULT - SUBJECTIVE AND OBJECTIVE BOX
[facility]                                                       Dr. Juarez, Dr. Rowe, Dr. Carter, Dr. Pierre, Dr. Hoyos, Dr. Reynoso                                      [facility], [facility], [address]                                                 [address] [address]                                      Ph: [phone]  Fax: [phone]                                         Ph: [phone]  Fax: [phone]      Patient is a 89y old  Male who presents with a chief complaint of symptomatic bradycardia, YANETH, hyperkalemia (05 Dec 2024 13:56)  Patient seen in follow up for YANETH, Hyperkalemia.        PAST MEDICAL HISTORY:  Diabetes    HTN (hypertension)    CAD (coronary artery disease)    HLD (hyperlipidemia)    Bladder cancer    Aortic stenosis    Anemia of chronic disease      MEDICATIONS  (STANDING):  aspirin  chewable 81 milliGRAM(s) Oral daily  atorvastatin 80 milliGRAM(s) Oral at bedtime  chlorhexidine 2% Cloths 1 Application(s) Topical <User Schedule>  glucagon  Injectable 1 milliGRAM(s) IntraMuscular once  heparin   Injectable 5000 Unit(s) SubCutaneous every 8 hours  sodium chloride 0.9%. 1000 milliLiter(s) (60 mL/Hr) IV Continuous <Continuous>    MEDICATIONS  (PRN):  bisacodyl Suppository 10 milliGRAM(s) Rectal daily PRN Constipation  HYDROmorphone  Injectable 0.5 milliGRAM(s) IV Push every 2 hours PRN dyspnea  HYDROmorphone  Injectable 0.5 milliGRAM(s) IV Push every 2 hours PRN Moderate Pain (4 - 6)  HYDROmorphone  Injectable 1 milliGRAM(s) IV Push every 2 hours PRN Severe Pain (7 - 10)  LORazepam   Injectable 0.5 milliGRAM(s) IV Push every 2 hours PRN anxiety, agitation, refractory dyspnea  sodium chloride 0.9% lock flush 10 milliLiter(s) IV Push every 1 hour PRN Pre/post blood products, medications, blood draw, and to maintain line patency    T(C): 37.6 (12-06-24 @ 07:50), Max: 38.4 (12-05-24 @ 11:58)  HR: 80 (12-06-24 @ 11:00) (60 - 93)  BP: 145/85 (12-06-24 @ 11:00) (72/45 - 173/67)  RR: 28 (12-06-24 @ 11:00)  SpO2: 98% (12-06-24 @ 11:00)  Wt(kg): --  I&O's Detail    05 Dec 2024 07:01  -  06 Dec 2024 07:00  --------------------------------------------------------  IN:    Lactated Ringers: 600 mL    Lactated Ringers Bolus: 1000 mL  Total IN: 1600 mL    OUT:    Indwelling Catheter - Urethral (mL): 935 mL  Total OUT: 935 mL    Total NET: 665 mL      06 Dec 2024 07:01  -  06 Dec 2024 11:49  --------------------------------------------------------  IN:  Total IN: 0 mL    OUT:    Indwelling Catheter - Urethral (mL): 195 mL  Total OUT: 195 mL    Total NET: -195 mL              PHYSICAL EXAM:  General: lethargic  Respiratory: b/l air entry  Cardiovascular: S1 S2  Gastrointestinal: soft  Extremities:  no edema        LABORATORY:                        9.1    11.16 )-----------( 169      ( 05 Dec 2024 05:30 )             26.5     12-05    136  |  100  |  54[H]  ----------------------------<  243[H]  4.3   |  32[H]  |  3.50[H]    Ca    9.6      05 Dec 2024 05:30  Phos  3.7     12-05  Mg     2.2     12-05    TPro  6.0  /  Alb  3.3  /  TBili  0.7  /  DBili  x   /  AST  36  /  ALT  37  /  AlkPhos  44  12-05    Sodium: 136 mmol/L (12-05 @ 05:30)  Sodium: 139 mmol/L (12-05 @ 00:41)  Sodium: 138 mmol/L (12-04 @ 18:50)  Sodium: 139 mmol/L (12-04 @ 13:19)    Potassium: 4.3 mmol/L (12-05 @ 05:30)  Potassium: 4.1 mmol/L (12-05 @ 00:41)  Potassium: 4.6 mmol/L (12-04 @ 18:50)  Potassium: 4.6 mmol/L (12-04 @ 13:19)    Hemoglobin: 9.1 g/dL (12-05 @ 05:30)  Hemoglobin: 9.1 g/dL (12-04 @ 13:19)  Hemoglobin: 10.6 g/dL (12-04 @ 08:55)    Creatinine, Serum 3.50 (12-05 @ 05:30)  Creatinine, Serum 3.60 (12-05 @ 00:41)  Creatinine, Serum 3.50 (12-04 @ 18:50)  Creatinine, Serum 3.70 (12-04 @ 13:19)        LIVER FUNCTIONS - ( 05 Dec 2024 05:30 )  Alb: 3.3 g/dL / Pro: 6.0 g/dL / ALK PHOS: 44 U/L / ALT: 37 U/L / AST: 36 U/L / GGT: x           Urinalysis Basic - ( 05 Dec 2024 05:30 )    Color: x / Appearance: x / SG: x / pH: x  Gluc: 243 mg/dL / Ketone: x  / Bili: x / Urobili: x   Blood: x / Protein: x / Nitrite: x   Leuk Esterase: x / RBC: x / WBC x   Sq Epi: x / Non Sq Epi: x / Bacteria: x      ABG - ( 04 Dec 2024 14:28 )  pH, Arterial: 7.40  pH, Blood: x     /  pCO2: 32    /  pO2: 63    / HCO3: 20    / Base Excess: -5.0  /  SaO2: 93.8

## 2024-12-06 NOTE — PROGRESS NOTE ADULT - PROBLEM SELECTOR PLAN 4
discussed with family, ICU RN, ICU team. Emotional support provided.  anticipate life expectancy minutes/hours post discontinuation.    Savannah Holder MD, [facility]; Palliative Care Attending, Ethicist. [phone]

## 2024-12-06 NOTE — PROGRESS NOTE ADULT - SUBJECTIVE AND OBJECTIVE BOX
Patient is a 89y old  Male who presents with a chief complaint of symptomatic bradycardia, YANETH, hyperkalemia (06 Dec 2024 12:16)    HPI:  89 yo M with PMH HTN, CAD, HLD, DM2, mitral valve replacement, bladder cancer s/p TURBT (2023), Anemia , BPH, aortic stenosis s/p TAVR (2017), ulcerative colitis presents to the ED with 2 days of dizziness. Patient is unable to provide history, history was mainly obtained from wife. Per wife, pt had generalized malaise for the past three days. No specific trigger for symptoms. Pt had associated dizziness, unsteadiness with ambulation, along with diaphoresis. Family denies changes in urinary frequency, at baseline has increased frequency due to history of bladder cancer. Pt did have some constipation for a few days, which resolved with a BM last night per wife.   Pt had seen his cardiologist at the end of November, Dr. Jacob, where the medication for his LE edema was changed from Lasix to Torsemide. Has had no issues with this medication so far.   Son reports med compliance may be an issue, thinks his dad could be mixing up pills including cardiac and DM meds which can be contributory to above.      ED Course:   Vitals: BP: 108/60, HR: 42 -> 30 -> 42 -> 24 -> 78, Temp: 97F, RR: 16, SpO2: 92% on RA --> 96% 3L NC    Labs:  K 6.2, BUN/Cr 48/3.9, Glucose 319, Hgb 10.6, Trop 91 proBNP 8310, Mag 1.5   ABG: pH: 7.33, PO2: 102, PCO2: 32, HCO3: 17, SpO2: 98%  UA: N/A   CXR: Heart magnified by technique. TAVR  aortic valve again noted.Slightly increased scattered interstitial markings the lungs possibly chronic are unchanged. 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 Chest is similar to April 17 this year. Follow-up AP chest on December 4, 2024 at 11:14 AM. A temporary pacer wire has been inserted from right jugular approach with tip in the right atrioventricular area. IMPRESSION: Temporary pacer wire inserted.  CT: N/A   EKG: Marked sinus bradycardia HR 34 Left axis deviation Left bundle branch block  Received in the ED: Atropine 1mg x 1, Calcium Chloride 500mg IVP x 1, Humalin 10u IVP x 1, Mag Sulfate 2g x 1, Lokelma 10mg x 1, Dextrose 50% 50cc x 1, NS 500cc bolus x 1, Albuterol neb    (04 Dec 2024 11:35)    INTERVAL HPI:  12/5: Patient evaluated at bedside, no acute overnight events. No plans for PPM any further, consideration for remove TVP; DNR/DNI   12/6: Patient evaluated at bedside, lethargic s/p ativan. No further plans for PPM. Plans for TVP removal today. DNR/DNI      OVERNIGHT EVENTS: None.       Home Medications:  aspirin 81 mg oral tablet: 1 dose(s) orally once a day (04 Dec 2024 12:14)  atorvastatin 80 mg oral tablet: 1 tab(s) orally once a day (04 Dec 2024 12:14)  glipiZIDE 10 mg oral tablet: 1 tab(s) orally 2 times a day (04 Dec 2024 12:49)  lisinopril 2.5 mg oral tablet: 1 tab(s) orally once a day (04 Dec 2024 12:15)  meclizine 12.5 mg oral tablet: 1 tab(s) orally once a day (04 Dec 2024 12:49)  metFORMIN 500 mg oral tablet: 1 tab(s) orally 2 times a day (04 Dec 2024 12:49)  metoprolol succinate 25 mg oral capsule, extended release: 1 cap(s) orally once a day (04 Dec 2024 12:15)  Plavix 75 mg oral tablet: 1 tab(s) orally once a day (04 Dec 2024 12:14)  SITagliptin (as base) 50 mg oral tablet: 1 tab(s) orally once a day (04 Dec 2024 12:15)  tamsulosin 0.4 mg oral capsule: 1 cap(s) orally once a day (04 Dec 2024 12:15)  torsemide 20 mg oral tablet: 1 tab(s) orally once a day (04 Dec 2024 12:15)      MEDICATIONS  (STANDING):  chlorhexidine 2% Cloths 1 Application(s) Topical <User Schedule>  HYDROmorphone  Injectable 1 milliGRAM(s) IV Push once  midazolam Injectable 2 milliGRAM(s) IV Push once    MEDICATIONS  (PRN):  bisacodyl Suppository 10 milliGRAM(s) Rectal daily PRN Constipation  HYDROmorphone  Injectable 0.5 milliGRAM(s) IV Push every 2 hours PRN Moderate Pain (4 - 6)  HYDROmorphone  Injectable 1 milliGRAM(s) IV Push every 2 hours PRN Severe Pain (7 - 10)  HYDROmorphone  Injectable 1 milliGRAM(s) IV Push every 2 hours PRN dyspnea  LORazepam   Injectable 0.5 milliGRAM(s) IV Push every 2 hours PRN anxiety, agtiation, refractory dyspnea      Allergies    simvastatin (Other)  Cipro (Other)  lovastatin (Other)  sulfa drugs (Other)    Intolerances        Social History:  Tobacco Usage:  former smoker, quit 1988  Alcohol Usage: denies  Substance Use:  denies  Lives with: at home with family  Ambulates: with walker (04 Dec 2024 11:35)      REVIEW OF SYSTEMS:  CONSTITUTIONAL: No fever, No chills, No fatigue, No myalgia, No Body ache, No Weakness  EYES: No eye pain,  No visual disturbances, No discharge, NO Redness  ENMT:  No ear pain, No nose bleed, No vertigo; No sinus pain, NO throat pain, No Congestion  NECK: No pain, No stiffness  RESPIRATORY: No cough, NO wheezing, No  hemoptysis, NO  shortness of breath  CARDIOVASCULAR: No chest pain, palpitations  GASTROINTESTINAL: No abdominal pain, NO epigastric pain. No nausea, No vomiting; No diarrhea, No constipation. [  ] BM  GENITOURINARY: No dysuria, No frequency, No urgency, No hematuria, NO incontinence  NEUROLOGICAL: No headaches, No dizziness, No numbness, No tingling, No tremors, No weakness  EXT: No Swelling, No Pain, No Edema  SKIN:  [  ] No itching, burning, rashes, or lesions   MUSCULOSKELETAL: No joint pain ,No Jt swelling; No muscle pain, No back pain, No extremity pain  PSYCHIATRIC: No depression,  No anxiety,  No mood swings ,No difficulty sleeping at night  PAIN SCALE: [  ] None  [  ] Other-  ROS Unable to obtain due to - [  ] Dementia  [  ] Lethargy [  ] Drowsy [ X ] Sedated [  ] non verbal [ X ] comfort measures   REST OF REVIEW Of SYSTEM - [  ] Normal     Vital Signs Last 24 Hrs  T(C): 37.4 (06 Dec 2024 12:03), Max: 37.7 (05 Dec 2024 15:00)  T(F): 99.3 (06 Dec 2024 12:03), Max: 99.8 (05 Dec 2024 15:00)  HR: 80 (06 Dec 2024 12:00) (79 - 93)  BP: 148/70 (06 Dec 2024 12:00) (72/45 - 173/67)  BP(mean): 101 (06 Dec 2024 12:00) (54 - 113)  RR: 29 (06 Dec 2024 12:00) (22 - 33)  SpO2: 99% (06 Dec 2024 12:00) (89% - 99%)    Parameters below as of 06 Dec 2024 12:00  Patient On (Oxygen Delivery Method): mask, Venturi    O2 Concentration (%): 50  Finger Stick        12-05 @ 07:01  -  12-06 @ 07:00  --------------------------------------------------------  IN: 1600 mL / OUT: 935 mL / NET: 665 mL    12-06 @ 07:01  -  12-06 @ 12:35  --------------------------------------------------------  IN: 0 mL / OUT: 315 mL / NET: -315 mL        PHYSICAL EXAM:  GENERAL:  [X  ] NAD , [ X ] ill-appearing, [  ] Agitated, [  ] Drowsy,  [  ] Lethargy, [ X ] confused   HEAD:  [ X ] Normal, [  ] Other  EYES:  [  ] EOMI, [ X ] PERRLA, [  ] conjunctiva and sclera clear normal, [  ] Other,  [  ] Pallor,[  ] Discharge  ENMT:  [ X ] Normal, [  ] Moist mucous membranes, [  ] Good dentition, [  ] No Thrush  NECK:  [  ] Supple, [  ] No JVD, [  ] Normal thyroid, [  ] Lymphadenopathy [  ] Other  CHEST/LUNG:  [ X ] Clear to auscultation bilaterally, [  ] Breath Sounds equal B/L / Decrease, [  ] poor effort  [  ] No rales, [  ] No rhonchi  [  ]  No wheezing,   HEART:  [ X ] +S1S2 [  ] tachycardia, [  ] Bradycardia,  [  ] irregular  [  ] No murmurs, No rubs, No gallops, [  ] PPM in place (Mfr:  )  ABDOMEN:  [ X  ] Soft, [ X ] Nontender, [ X ] Nondistended, [  ] No mass, [X  ] Bowel sounds present, [  ] obese  NERVOUS SYSTEM:  [  ] Alert & Oriented X0, [  ] Nonfocal  [  ] Confusion  [  ] Encephalopathic [ X ] Sedated [ X ] Unable to assess, [  ] Dementia [  ] Other-  EXTREMITIES: [ X ] 2+ Peripheral Pulses, No clubbing, No cyanosis,  [  ] edema B/L lower EXT. [  ] PVD stasis skin changes B/L Lower EXT, [  ] wound  LYMPH: No lymphadenopathy noted  SKIN:  [ X ] No rashes or lesions, [  ] Pressure Ulcers, [  ] ecchymosis, [  ] Skin Tears, [  ] Other    DIET:     LABS:      Ca    9.6        05 Dec 2024 05:30        Urinalysis Basic - ( 05 Dec 2024 05:30 )    Color: x / Appearance: x / SG: x / pH: x  Gluc: 243 mg/dL / Ketone: x  / Bili: x / Urobili: x   Blood: x / Protein: x / Nitrite: x   Leuk Esterase: x / RBC: x / WBC x   Sq Epi: x / Non Sq Epi: x / Bacteria: x        Culture Results:   No growth at 24 hours (12-04 @ 16:37)  Culture Results:   No growth at 24 hours (12-04 @ 16:30)  Culture Results:   No growth (12-04 @ 12:15)      culture blood  -- .Blood BLOOD 12-04 @ 16:37    culture urine  --  12-04 @ 16:37  culture blood  -- .Blood BLOOD 12-04 @ 16:30    culture urine  --  12-04 @ 16:30              Culture - Blood (collected 04 Dec 2024 16:37)  Source: .Blood BLOOD  Preliminary Report (06 Dec 2024 04:01):    No growth at 24 hours    Culture - Blood (collected 04 Dec 2024 16:30)  Source: .Blood BLOOD  Preliminary Report (06 Dec 2024 04:01):    No growth at 24 hours    Culture - Urine (collected 04 Dec 2024 12:15)  Source: Catheterized Catheterized  Final Report (06 Dec 2024 05:31):    No growth         Anemia Panel:      Thyroid Panel:  Thyroid Stimulating Hormone, Serum: 3.34 uIU/mL (12-04-24 @ 13:19)                RADIOLOGY & ADDITIONAL TESTS:  < from: Xray Chest 1 View-PORTABLE IMMEDIATE (Xray Chest 1 View-PORTABLE IMMEDIATE .) (12.05.24 @ 07:34) >  ACC: [id number] EXAM:  XR CHEST PORTABLE IMMED 1V   ORDERED BY: RAJENDRA LIANG     PROCEDURE DATE:  12/05/2024          INTERPRETATION:  AP chest on December 5, 2024 at 7:16 AM. Patient had   complete heart block is being followed for temporary pacer.    Heart magnified by technique. TAVR aortic valve and temporary pacer wire   and into the right jugular area again noted.    There is an increasing retrocardiac infiltrate and a slightly increasing   central congestive picture compared to December4.    IMPRESSION: Slightly increasing central CHF and increasing retrocardiac   infiltrate. Right pacer wire again noted.    --- End of Report ---    < end of copied text >        HEALTH ISSUES - PROBLEM Dx:  Symptomatic bradycardia    YANETH (acute kidney injury)    Electrolyte disturbance    CAD (coronary artery disease)    HLD (hyperlipidemia)    HTN (hypertension)    Leg edema    T2DM (type 2 diabetes mellitus)    BPH (benign prostatic hyperplasia)    Need for prophylactic measure    Metabolic encephalopathy    Anemia    Symptomatic bradycardia    ACP (advance care planning)    Palliative care encounter    Fever            Consultant(s) Notes Reviewed:  [ X ] YES     Care Discussed with [X] Consultants  [  ] Patient  [X  ] Family [  ] HCP [  ]   [  ] Social Service  [ X ] RN, [  ] Physical Therapy,[ X ] Palliative care team  DVT PPX: [  ] Lovenox, [  ] S C Heparin, [  ] Coumadin, [  ] Xarelto, [  ] Eliquis, [  ] Pradaxa, [  ] IV Heparin drip, [ X ] SCD [  ] Contraindication 2 to GI Bleed,[  ] Ambulation [  ] Contraindicated 2 to  bleed [  ] Contraindicated 2 to Brain Bleed  Advanced directive: [  ] None, [ X ] DNR/DNI Patient is a 89y old  Male who presents with a chief complaint of symptomatic bradycardia, YANETH, hyperkalemia (06 Dec 2024 12:16)    HPI:  89 yo M with PMH HTN, CAD, HLD, DM2, mitral valve replacement, bladder cancer s/p TURBT (2023), Anemia , BPH, aortic stenosis s/p TAVR (2017), ulcerative colitis presents to the ED with 2 days of dizziness. Patient is unable to provide history, history was mainly obtained from wife. Per wife, pt had generalized malaise for the past three days. No specific trigger for symptoms. Pt had associated dizziness, unsteadiness with ambulation, along with diaphoresis. Family denies changes in urinary frequency, at baseline has increased frequency due to history of bladder cancer. Pt did have some constipation for a few days, which resolved with a BM last night per wife.   Pt had seen his cardiologist at the end of November, Dr. Jacob, where the medication for his LE edema was changed from Lasix to Torsemide. Has had no issues with this medication so far.   Son reports med compliance may be an issue, thinks his dad could be mixing up pills including cardiac and DM meds which can be contributory to above.      ED Course:   Vitals: BP: 108/60, HR: 42 -> 30 -> 42 -> 24 -> 78, Temp: 97F, RR: 16, SpO2: 92% on RA --> 96% 3L NC    Labs:  K 6.2, BUN/Cr 48/3.9, Glucose 319, Hgb 10.6, Trop 91 proBNP 8310, Mag 1.5   ABG: pH: 7.33, PO2: 102, PCO2: 32, HCO3: 17, SpO2: 98%  UA: N/A   CXR: Heart magnified by technique. TAVR  aortic valve again noted.Slightly increased scattered interstitial markings the lungs possibly chronic are unchanged. 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 Chest is similar to April 17 this year. Follow-up AP chest on December 4, 2024 at 11:14 AM. A temporary pacer wire has been inserted from right jugular approach with tip in the right atrioventricular area. IMPRESSION: Temporary pacer wire inserted.  CT: N/A   EKG: Marked sinus bradycardia HR 34 Left axis deviation Left bundle branch block  Received in the ED: Atropine 1mg x 1, Calcium Chloride 500mg IVP x 1, Humalin 10u IVP x 1, Mag Sulfate 2g x 1, Lokelma 10mg x 1, Dextrose 50% 50cc x 1, NS 500cc bolus x 1, Albuterol neb    (04 Dec 2024 11:35)    INTERVAL HPI:  12/5: Patient evaluated at bedside, no acute overnight events. No plans for PPM any further, consideration for remove TVP; DNR/DNI   12/6: Patient evaluated at bedside, lethargic s/p ativan. No further plans for PPM. Plans for TVP removal today. DNR/DNI, plan for deactivation of TVPW, on CMO      OVERNIGHT EVENTS: None.       Home Medications:  aspirin 81 mg oral tablet: 1 dose(s) orally once a day (04 Dec 2024 12:14)  atorvastatin 80 mg oral tablet: 1 tab(s) orally once a day (04 Dec 2024 12:14)  glipiZIDE 10 mg oral tablet: 1 tab(s) orally 2 times a day (04 Dec 2024 12:49)  lisinopril 2.5 mg oral tablet: 1 tab(s) orally once a day (04 Dec 2024 12:15)  meclizine 12.5 mg oral tablet: 1 tab(s) orally once a day (04 Dec 2024 12:49)  metFORMIN 500 mg oral tablet: 1 tab(s) orally 2 times a day (04 Dec 2024 12:49)  metoprolol succinate 25 mg oral capsule, extended release: 1 cap(s) orally once a day (04 Dec 2024 12:15)  Plavix 75 mg oral tablet: 1 tab(s) orally once a day (04 Dec 2024 12:14)  SITagliptin (as base) 50 mg oral tablet: 1 tab(s) orally once a day (04 Dec 2024 12:15)  tamsulosin 0.4 mg oral capsule: 1 cap(s) orally once a day (04 Dec 2024 12:15)  torsemide 20 mg oral tablet: 1 tab(s) orally once a day (04 Dec 2024 12:15)      MEDICATIONS  (STANDING):  chlorhexidine 2% Cloths 1 Application(s) Topical <User Schedule>  HYDROmorphone  Injectable 1 milliGRAM(s) IV Push once  midazolam Injectable 2 milliGRAM(s) IV Push once    MEDICATIONS  (PRN):  bisacodyl Suppository 10 milliGRAM(s) Rectal daily PRN Constipation  HYDROmorphone  Injectable 0.5 milliGRAM(s) IV Push every 2 hours PRN Moderate Pain (4 - 6)  HYDROmorphone  Injectable 1 milliGRAM(s) IV Push every 2 hours PRN Severe Pain (7 - 10)  HYDROmorphone  Injectable 1 milliGRAM(s) IV Push every 2 hours PRN dyspnea  LORazepam   Injectable 0.5 milliGRAM(s) IV Push every 2 hours PRN anxiety, agtiation, refractory dyspnea      Allergies    simvastatin (Other)  Cipro (Other)  lovastatin (Other)  sulfa drugs (Other)    Intolerances        Social History:  Tobacco Usage:  former smoker, quit 1988  Alcohol Usage: denies  Substance Use:  denies  Lives with: at home with family  Ambulates: with walker (04 Dec 2024 11:35)      REVIEW OF SYSTEMS:   ROS Unable to obtain due to - [  ] Dementia  [  ] Lethargy [ x ] Drowsy [ X ] Sedated [  ] non verbal [ X ] comfort measures   REST OF REVIEW Of SYSTEM - [  ] Normal     Vital Signs Last 24 Hrs  T(C): 37.4 (06 Dec 2024 12:03), Max: 37.7 (05 Dec 2024 15:00)  T(F): 99.3 (06 Dec 2024 12:03), Max: 99.8 (05 Dec 2024 15:00)  HR: 80 (06 Dec 2024 12:00) (79 - 93)  BP: 148/70 (06 Dec 2024 12:00) (72/45 - 173/67)  BP(mean): 101 (06 Dec 2024 12:00) (54 - 113)  RR: 29 (06 Dec 2024 12:00) (22 - 33)  SpO2: 99% (06 Dec 2024 12:00) (89% - 99%)    Parameters below as of 06 Dec 2024 12:00  Patient On (Oxygen Delivery Method): mask, Venturi    O2 Concentration (%): 50  Finger Stick        12-05 @ 07:01  -  12-06 @ 07:00  --------------------------------------------------------  IN: 1600 mL / OUT: 935 mL / NET: 665 mL    12-06 @ 07:01  -  12-06 @ 12:35  --------------------------------------------------------  IN: 0 mL / OUT: 315 mL / NET: -315 mL        PHYSICAL EXAM:  GENERAL:  [X  ] NAD , [ X ] ill-appearing, [  ] Agitated, [  ] Drowsy,  [  ] Lethargy, [ X ] confused   HEAD:  [ X ] Normal, [  ] Other  EYES:  [  ] EOMI, [ X ] PERRLA, [  ] conjunctiva and sclera clear normal, [  ] Other,  [  ] Pallor,[  ] Discharge  ENMT:  [ X ] Normal, [  ] Moist mucous membranes, [  ] Good dentition, [  ] No Thrush  NECK:  [  ] Supple, [ x ] No JVD, [  ] Normal thyroid, [  ] Lymphadenopathy [  ] Other  CHEST/LUNG:  [ X ] Clear to auscultation bilaterally, [x  ] Breath Sounds equal B/L / Decrease, [  ] poor effort  [ x ] No rales, [ x ] No rhonchi  [x  ]  No wheezing,   HEART:  [ X ] +S1S2 [  ] tachycardia, [  ] Bradycardia,  [  ] irregular  [  ] No murmurs, No rubs, No gallops, [  ] PPM in place (Mfr:  )  ABDOMEN:  [ X  ] Soft, [ X ] Nontender, [ X ] Nondistended, [  ] No mass, [X  ] Bowel sounds present, [  ] obese  NERVOUS SYSTEM:  [  ] Alert & Oriented X0, [  ] Nonfocal  [  ] Confusion  [  ] Encephalopathic [ X ] Sedated [ X ] Unable to assess, [  ] Dementia [  ] Other-  EXTREMITIES: [ X ] 2+ Peripheral Pulses, No clubbing, No cyanosis,  [  ] edema B/L lower EXT. [  ] PVD stasis skin changes B/L Lower EXT, [  ] wound  LYMPH: No lymphadenopathy noted  SKIN:  [ X ] No rashes or lesions, [  ] Pressure Ulcers, [  ] ecchymosis, [  ] Skin Tears, [  ] Other    DIET: soft    LABS:      Ca    9.6        05 Dec 2024 05:30        Urinalysis Basic - ( 05 Dec 2024 05:30 )    Color: x / Appearance: x / SG: x / pH: x  Gluc: 243 mg/dL / Ketone: x  / Bili: x / Urobili: x   Blood: x / Protein: x / Nitrite: x   Leuk Esterase: x / RBC: x / WBC x   Sq Epi: x / Non Sq Epi: x / Bacteria: x        Culture Results:   No growth at 24 hours (12-04 @ 16:37)  Culture Results:   No growth at 24 hours (12-04 @ 16:30)  Culture Results:   No growth (12-04 @ 12:15)      culture blood  -- .Blood BLOOD 12-04 @ 16:37    culture urine  --  12-04 @ 16:37  culture blood  -- .Blood BLOOD 12-04 @ 16:30    culture urine  --  12-04 @ 16:30        Culture - Blood (collected 04 Dec 2024 16:37)  Source: .Blood BLOOD  Preliminary Report (06 Dec 2024 04:01):    No growth at 24 hours    Culture - Blood (collected 04 Dec 2024 16:30)  Source: .Blood BLOOD  Preliminary Report (06 Dec 2024 04:01):    No growth at 24 hours    Culture - Urine (collected 04 Dec 2024 12:15)  Source: Catheterized Catheterized  Final Report (06 Dec 2024 05:31):    No growth         Anemia Panel:      Thyroid Panel:  Thyroid Stimulating Hormone, Serum: 3.34 uIU/mL (12-04-24 @ 13:19)                RADIOLOGY & ADDITIONAL TESTS:  < from: Xray Chest 1 View-PORTABLE IMMEDIATE (Xray Chest 1 View-PORTABLE IMMEDIATE .) (12.05.24 @ 07:34) >  ACC: [id number] EXAM:  XR CHEST PORTABLE IMMED 1V   ORDERED BY: RAJENDRA LIANG     PROCEDURE DATE:  12/05/2024          INTERPRETATION:  AP chest on December 5, 2024 at 7:16 AM. Patient had   complete heart block is being followed for temporary pacer.    Heart magnified by technique. TAVR aortic valve and temporary pacer wire   and into the right jugular area again noted.    There is an increasing retrocardiac infiltrate and a slightly increasing   central congestive picture compared to December4.    IMPRESSION: Slightly increasing central CHF and increasing retrocardiac   infiltrate. Right pacer wire again noted.    --- End of Report ---    < end of copied text >        HEALTH ISSUES - PROBLEM Dx:  Symptomatic bradycardia    YANETH (acute kidney injury)    Electrolyte disturbance    CAD (coronary artery disease)    HLD (hyperlipidemia)    HTN (hypertension)    Leg edema    T2DM (type 2 diabetes mellitus)    BPH (benign prostatic hyperplasia)    Need for prophylactic measure    Metabolic encephalopathy    Anemia    Symptomatic bradycardia    ACP (advance care planning)    Palliative care encounter    Fever        Consultant(s) Notes Reviewed:  [ X ] YES     Care Discussed with [X] Consultants  [  ] Patient  [X  ] Family [  ] HCP [  ]   [  ] Social Service  [ X ] RN, [  ] Physical Therapy,[ X ] Palliative care team  DVT PPX: [  ] Lovenox, [  ] S C Heparin, [  ] Coumadin, [  ] Xarelto, [  ] Eliquis, [  ] Pradaxa, [  ] IV Heparin drip, [ X ] SCD [  ] Contraindication 2 to GI Bleed,[  ] Ambulation [  ] Contraindicated 2 to  bleed [  ] Contraindicated 2 to Brain Bleed  Advanced directive: [  ] None, [ X ] DNR/DNI

## 2024-12-06 NOTE — PROGRESS NOTE ADULT - PROBLEM SELECTOR PLAN 1
with TVP, plan to de-activate today  no plan for PPM  prior to discontinuation, will need significant medication as will lose pump for circulation    prior to discontinuation, will give IV dilaudid and IV versed.   will ensure comfortable before stopping

## 2024-12-06 NOTE — PROGRESS NOTE ADULT - PROBLEM SELECTOR PLAN 4
? etiology  No ABx  CBC in AM   As per ICU- No further plans for workup  Plan for palliative discontinuation of TVP; comfort measures in place

## 2024-12-06 NOTE — PROGRESS NOTE ADULT - PROBLEM SELECTOR PLAN 8
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

## 2024-12-06 NOTE — PROGRESS NOTE ADULT - CONVERSATION DETAILS
Met with wife and extended family on this date  decision made to transition to CMO and discontinue TVP.  Discussed premedication, side effects of medication and anticipated limited life expectancy.  All questions answered, emotional support provided.    prior to deescalation, will give IV benzo and IV opiate to prevent pain and suffering.
Had meeting with Wife and HCP, Jenn, and 3 children at bedside, with EP. Option of PPM placement discussed. Family is concerned that the patient's mental status and renal failure is not consistent with the patients ideal quality of life and they feel that rescinding DNR for PPM is not consistent with the patient's goals of care, especially since PPM placement may not restore him to his prior functional status. Comfort measures only was discussed and at this time they would like to keep the TVP in place until additional family is able to visit. They would like to give the patient pleasure feeds as tolerated, use IVF, but otherwise no pressors or additions to care. All questions answered and palliative consult placed.

## 2024-12-06 NOTE — PROGRESS NOTE ADULT - SUBJECTIVE AND OBJECTIVE BOX
Indication for Geriatrics and Palliative Care Services/INTERVAL HPI: goals of care  SUBJECTIVE AND OBJECTIVE: Pt seen and examined, agitated, grimacing. received IV ativan x2 OVERNIGHT    OVERNIGHT EVENTS: received ativan x2 overnight, more lethargic today    DNR on chart:DNI  DNI      Allergies    simvastatin (Other)  Cipro (Other)  lovastatin (Other)  sulfa drugs (Other)    Intolerances    MEDICATIONS  (STANDING):  chlorhexidine 2% Cloths 1 Application(s) Topical <User Schedule>    MEDICATIONS  (PRN):  bisacodyl Suppository 10 milliGRAM(s) Rectal daily PRN Constipation  HYDROmorphone  Injectable 0.5 milliGRAM(s) IV Push every 2 hours PRN Moderate Pain (4 - 6)  HYDROmorphone  Injectable 1 milliGRAM(s) IV Push every 2 hours PRN Severe Pain (7 - 10)  HYDROmorphone  Injectable 1 milliGRAM(s) IV Push every 2 hours PRN dyspnea  LORazepam   Injectable 0.5 milliGRAM(s) IV Push every 2 hours PRN anxiety, agtiation, refractory dyspnea      ITEMS UNCHECKED ARE NOT PRESENT    PRESENT SYMPTOMS: [ x]Unable to self-report - see [ ] CPOT [x ] PAINADS [x ] RDOS  Source if other than patient:  [ ]Family   [ ]Team     Pain:  [ ]yes [ ]no  QOL impact -   Location -                    Aggravating factors -  Quality -  Radiation -  Timing-  Severity (0-10 scale):  Minimal acceptable level (0-10 scale):     CPOT:    https://[email or web address]/getattachment/[email or web address]/Critical-Care-Pain-Observation-Tool-(CPOT)    Dyspnea:                           [ ]Mild [ ]Moderate [ ]Severe  Anxiety:                             [ ]Mild [ ]Moderate [ ]Severe  Fatigue:                             [ ]Mild [ ]Moderate [ ]Severe  Nausea:                             [ ]Mild [ ]Moderate [ ]Severe  Loss of appetite:              [ ]Mild [ ]Moderate [ ]Severe  Constipation:                    [ ]Mild [ ]Moderate [ ]Severe    PCSSQ[Palliative Care Spiritual Screening Question]   Severity (0-10):  Score of 4 or > indicate consideration of Chaplaincy referral.  Chaplaincy Referral: [ ] yes [ ] refused [ ] following [x ] Deferred     Caregiver [place]? : [ ] yes [ x] no [ ] Deferred [ ] Declined             Social work referral [ ] Patient & Family Centered Care Referral [ ]     Anticipatory Grief present?:  [ ] yes [x ] no  [ ] Deferred                  Social work referral [ ] Chaplaincy Referral[ ]      Other Symptoms:  [ ]All other review of systems negative   [ x]Unable to obtain due to poor mentation    Palliative Performance Status Version 2:     20    %      [email or web address]/files/[email or web address]_performance_[email or web address]_[email or web address]  PHYSICAL EXAM:  Vital Signs Last 24 Hrs  T(C): 37.4 (06 Dec 2024 12:03), Max: 37.7 (05 Dec 2024 15:00)  T(F): 99.3 (06 Dec 2024 12:03), Max: 99.8 (05 Dec 2024 15:00)  HR: 80 (06 Dec 2024 12:00) (79 - 93)  BP: 148/70 (06 Dec 2024 12:00) (72/45 - 173/67)  BP(mean): 101 (06 Dec 2024 12:00) (54 - 113)  RR: 29 (06 Dec 2024 12:00) (22 - 33)  SpO2: 99% (06 Dec 2024 12:00) (89% - 99%)    Parameters below as of 06 Dec 2024 12:00  Patient On (Oxygen Delivery Method): mask, Venturi    O2 Concentration (%): 50 I&O's Summary    05 Dec 2024 07:01  -  06 Dec 2024 07:00  --------------------------------------------------------  IN: 1600 mL / OUT: 935 mL / NET: 665 mL    06 Dec 2024 07:01  -  06 Dec 2024 14:04  --------------------------------------------------------  IN: 0 mL / OUT: 315 mL / NET: -315 mL       GENERAL: [ ]Cachexia    [ ]Alert  [ ]Oriented x   [x ]Lethargic  [ ]Unarousable  [ ]Verbal  [ ]Non-Verbal  Behavioral:   [ ]Anxiety  [ ]Delirium [ x]Agitation [ ]Other  HEENT:  [ ]Normal   [x ]Dry mouth   [ ]ET Tube/Trach  [ ]Oral lesions  PULMONARY:   [ ]Clear [ x]Tachypnea  [ ]Audible excessive secretions   [ ]Rhonchi        [ ]Right [ ]Left [ ]Bilateral  [ ]Crackles        [ ]Right [ ]Left [ ]Bilateral  [ ]Wheezing     [ ]Right [ ]Left [ ]Bilateral  [x ]Diminished BS [ ] Right [ ]Left [ ]Bilateral  CARDIOVASCULAR:  paced  [ x]Regular [ ]Irregular [ ]Tachy  [ ]Shorty [ ]Murmur [ ]Other  GASTROINTESTINAL:  [ ]Soft  [ ]Distended   [ ]+BS  [ ]Non tender [ ]Tender  [ ]Other [ ]PEG [ ]OGT/ NGT   Last BM:   GENITOURINARY:  [ ]Normal [x ]Incontinent   [ ]Oliguria/Anuria   [ ]Brush  MUSCULOSKELETAL:   [ ]Normal   [ x]Weakness  [x ]Bed/Wheelchair bound [ ]Edema  NEUROLOGIC:   [ ]No focal deficits  [ ] Cognitive impairment  [ ] Dysphagia [ ]Dysarthria [ ] Paresis [ ]Other   SKIN:   [ ]Normal  [ ]Rash  [ ]Other  [ ]Pressure ulcer(s) [ ]y [ ]n present on admission    CRITICAL CARE:  [ ]Shock Present  [ ]Septic [x ]Cardiogenic [ ]Neurologic [ ]Hypovolemic  [ ]Vasopressors [ ]Inotropes  [ ]Respiratory failure present [ ]Mechanical Ventilation [ ]Non-invasive ventilatory support [ ]High-Flow   [ ]Acute  [ ]Chronic [ ]Hypoxic  [ ]Hypercarbic [ ]Other  [ ]Other organ failure     LABS:                        9.1    11.16 )-----------( 169      ( 05 Dec 2024 05:30 )             26.5   12-05    136  |  100  |  54[H]  ----------------------------<  243[H]  4.3   |  32[H]  |  3.50[H]    Ca    9.6      05 Dec 2024 05:30  Phos  3.7     12-05  Mg     2.2     12-05    TPro  6.0  /  Alb  3.3  /  TBili  0.7  /  DBili  x   /  AST  36  /  ALT  37  /  AlkPhos  44  12-05      Urinalysis Basic - ( 05 Dec 2024 05:30 )    Color: x / Appearance: x / SG: x / pH: x  Gluc: 243 mg/dL / Ketone: x  / Bili: x / Urobili: x   Blood: x / Protein: x / Nitrite: x   Leuk Esterase: x / RBC: x / WBC x   Sq Epi: x / Non Sq Epi: x / Bacteria: x      RADIOLOGY & ADDITIONAL STUDIES: no new imaging    Protein Calorie Malnutrition Present: [ ]mild [ ]moderate [ ]severe [ ]underweight [ ]morbid obesity  https://www.andeal.org/vault/2440/web/files/ONC/Table_Clinical%20Characteristics%20to%20Document%20Malnutrition-White%20JV%20et%20al%202012.pdf    Height (cm): 172.7 (12-04-24 @ 12:00), 175.3 (04-17-24 @ 23:16)  Weight (kg): 68.4 (12-04-24 @ 12:00), 68 (04-17-24 @ 23:16)  BMI (kg/m2): 22.9 (12-04-24 @ 12:00), 22.1 (04-17-24 @ 23:16)    [x ]PPSV2 < or = 30%  [ ]significant weight loss [ x]poor nutritional intake [ ]anasarca[ ]Artificial Nutrition    Other REFERRALS:  [ ]Hospice  [ ]Child Life  [ x]Social Work  [ ]Case management [ ]Holistic Therapy     Goals of Care Document:

## 2024-12-06 NOTE — PROGRESS NOTE ADULT - PROVIDER SPECIALTY LIST ADULT
Cardiology
Nephrology
Palliative Care
Critical Care
Critical Care
Cardiology
Critical Care
Nephrology
Internal Medicine
Internal Medicine

## 2024-12-06 NOTE — PROGRESS NOTE ADULT - PROBLEM SELECTOR PLAN 12
Takes torsemide 20mg QD   - Hold all treatment measures. Comfort measures only.    #DVT PPX:   - Hold all treatment measures. Comfort measures only.

## 2024-12-06 NOTE — PROGRESS NOTE ADULT - PROBLEM SELECTOR PLAN 10
Takes metformin, glipizide, sitagliptin at home  - Hold home diabetic medications  - Hold all treatment measures. Comfort measures only.   A1c 7.6

## 2024-12-06 NOTE — PROGRESS NOTE ADULT - ASSESSMENT
89 year old male with a PMH of CAD, AS s/p TAVR, mitral valve replacement, DM2, bladder CA s/p TURBT who presented to the ED with dizziness.    Problem list:  High degree AV block s/p TVP  YANETH  Hyperkalemia  Lactic acidosis  Hypophosphatemia   Hyperglycemia     -S/p glucagon for potential BB overdose.  Continue to hold AV chase blockers.  -May require PPM placement if underlying rhythm not improved and if this is in line with family wishes  -Magnesium and phosphorus repleted tonight  -Bicarb gtt d/c'd as no longer acidotic.  Switched to maintenance fluids with LR  -Monitor renal function and UOP.  Making about 30 cc/hr of urine tonight   -Lactate downtrending   -ISS q6 hours.  Keep euglycemic  -Heparin subq for DVT ppx     DNR/DNI    Time spent on this patient encounter, which includes documenting this note in the electronic medical record, was 65 minutes including assessing the presenting problems with associated risks, reviewing the medical record to prepare for the encounter, and meeting face to face with patient to obtain additional history. I have also performed an appropriate physical exam, made interventions listed and ordered and interpreted appropriate diagnostic studies as documented. To improve communication and patient safety, I have coordinated care with the multidisciplinary team including the bedside nurse, appropriate attending of record and consultants as needed.  Date of entry of this note is equal to the date of services rendered. 
89yo M with a PMH of HTN, CAD, HLD, DM2, mitral valve replacement, bladder cancer s/p TURBT (2023), aortic stenosis s/p TAVR (2017), ulcerative colitis being admitted to ICU for complete heart block, YANETH, hyperkalemia.    - Varying degrees of AV block in the ED with prolonged periods of high degree AVB requiring emergent tvp  - TVP placed by EP team initially with good capture, though this has been less reliable. Tele shows good capture today  - Holding BB.  IV glucagon was  given to reverse BB  - Initial K 6.5.  Given Calcium, insulin, D50 . K now normalized  - have been monitoring rhythm closely for recovery, but thus far remains dependent on wire   - discussions have been ongoing regarding need for ppm, and family has now decided to pursue a more conservative approach and not place PPM     - Monitor respiratory status and mental status closely  - Trop rising now 250s, cont to trend to peak if this is in line with goc. likely demand related     - TTE (4/2024): LV appears grossly normal, LA mildly dilated, hx of TAVR and MV replacement, mild MR   - repeat TTE limited study, but preserved ef reasonable fxn of mvr and avr  - Hold home Metoprolol in setting of bradycardia, Hold home Lisinopril in setting of YANETH      - remains at risk for abrupt decompensation .   - awaiting family meeting with [facility] today
89 year old male with PMHx CAD, AS s/p TAVR (2017), mitral valve replacement, DM2, bladder CA s/p TURBT (2023) admitted to the ICU for CHB requiring emergent TVP. Also found to have YANETH and hyperkalemia. Hyperkalemia cocktail given in the ED. Glucagon given to reverse effects of home Metoprolol.    Symptomatic bradycardia  YANETH  Hyperkalemia   Hypomagnesemia  Acute hypercapnic respiratory failure   AMS    Neuro:   - Poor mentation likely secondary to metabolic derangements. Continue to monitor closely.    CV:   - s/p TVP. IV glucagon given in the ED to reverse metoprolol effects.   - Will hold on metoprolol in setting of AV block and lisinopril in the setting of YANETH.   - Troponin 91, likely demand ischemia. Will continue to trend. Uptrended to 247 on 12/5  - Lactate clearing, 4.7 > 2.1  - Follow up formal TTE. EP and cardiology following. Determination of next steps to be discussed    Resp:   - On NC with SpO2 in the 90s.   - ABG with respiratory acidosis in the setting of CHB (pH 7.33, CO2 32). Rpt improved. Maintain SpO2>90%.     GI: NPO for now.     Renal:   - K+ present on admission: 6.5. Calcium, insulin, and D50 given in the ED. 4.3 on repeat 12/5  - s/p NaHCO3   - Indwelling catheter placed with immediate output of 100 cc's.  - Will hold on lisionpril and torsemide in the setting of YANETH. YANETH, likely ATN.   - Baseline Cr 1.5. Will continue to trend renal function. Cr 3.5/BUN 54 on 12/5   - Strict I's and O's. Maintain Mg>2, Phos>3, K>4.   - Follow up UA and urine lytes.   - Nephrology following.     ID: Monitor WBC and temps.     Heme: Trend H+H. Heparin for DVT ppx. ASA daily.     Endo: History of DM2. On ISS. FS q6h.      Ethics: Patient is DNR/DNI. Doesn't wish to pursue dialysis at this time. Confirmed patient's wishes with healthcare proxy (wife, Jenn Archibald) at bedside. OKSANA in chart.     Case discussed with ICU attending, Dr. Plata.  
89 yo M with PMH HTN, CAD, HLD, DM2, mitral valve replacement, bladder cancer s/p TURBT (2023), Anemia , BPH, aortic stenosis s/p TAVR (2017), ulcerative colitis presents to the ED with 2 days of dizziness. Found to have heart block and given TVP. Due to prolonged bradycardic episode patient now with renal dysfunction. Palliative consulted to discuss GOC
YANETH: Prerenal azotemia, On ACEI, r/o Urinary retention  Hyperkalemia: YANETH, On ACEI  Bradycardia, Heart block, s/p TVP placement  Diabetes  Hypomagnesemia    12/04/24: Hold metformin, ACEI. Medical Rx for hyperkalemia as ordered. Cardiology evaluation. Check lactic acid level (pt on metformin). Magnesium supplementation.   Check bedside sonogram. Monitor blood sugar levels. Insulin coverage as needed. Dietary restriction.   Monitor BP trend. IV hydration as fluid status allows. ICU evaluation. Avoid nephrotoxic meds as possible. Avoid ACEI, ARB, NSAIDs and IV contrast.   Will follow electrolytes and renal function trend. Discussed with patients wife at the bedside.   12/05/24: Events noted. Stable renal indices. Gentle IV hydration. Cardiology follow up. D/w patients family at bedside. Goals of care discussion in progress. 
YANETH: Prerenal azotemia, On ACEI, r/o Urinary retention  Hyperkalemia: YANETH, On ACEI  Bradycardia, Heart block, s/p TVP placement  Diabetes  Hypomagnesemia    12/04/24: Hold metformin, ACEI. Medical Rx for hyperkalemia as ordered. Cardiology evaluation. Check lactic acid level (pt on metformin). Magnesium supplementation.   Check bedside sonogram. Monitor blood sugar levels. Insulin coverage as needed. Dietary restriction.   Monitor BP trend. IV hydration as fluid status allows. ICU evaluation. Avoid nephrotoxic meds as possible. Avoid ACEI, ARB, NSAIDs and IV contrast.   Will follow electrolytes and renal function trend. Discussed with patients wife at the bedside.   12/05/24: Events noted. Stable renal indices. Gentle IV hydration. Cardiology follow up. D/w patients family at bedside. Goals of care discussion in progress.   12/06/24: Pt now on supportive care. No blood draws. Will sign off. Thank you. 
89 year old male with PMHx CAD, AS s/p TAVR (2017), mitral valve replacement, DM2, bladder CA s/p TURBT (2023) admitted to the ICU for CHB requiring emergent TVP. Also found to have YANETH and hyperkalemia. Hyperkalemia cocktail given in the ED. Glucagon given to reverse effects of home Metoprolol.    Assessment:    Symptomatic bradycardia  YANETH  Hyperkalemia   Hypomagnesemia  Acute hypercapnic respiratory failure   AMS    Neuro:   - Poor mentation likely secondary to metabolic derangements and medications  - family amenable to PRN medication for pain, anxiety, sob and are aware of unintended side effects including sedation and lowering of BP at this time  - continue PRN dilaudid and ativan  - Palliative following    CV:   - s/p TVP. IV glucagon given in the ED to reverse metoprolol effects.   - Will hold on metoprolol in setting of AV block and lisinopril in the setting of YANETH.   - Troponin 91, likely demand ischemia  - TTE: LVEF 66%, left atrium severely dilated  - continue aspirin and statin  - At this time family would not like to pursue PPM placement  - plans for TVP removal today    Resp:   - On venti mask with SpO2 in the 90s.   - ABG with respiratory acidosis in the setting of CHB (pH 7.33, CO2 32). Rpt improved. Maintain SpO2>90%.     GI:  - family agreeable to pleasure feeds despite risks of aspiration  - soft and bite sized diet ordered    Renal:   - K+ present on admission: 6.5. Calcium, insulin, and D50 given in the ED. 4.3 on repeat 12/5  - s/p NaHCO3   - Indwelling catheter placed with immediate output of 100 cc's.  - Will hold on lisionpril and torsemide in the setting of YANETH. YANETH, likely ATN.   - Baseline Cr 1.5  - family requests for no blood draws or dialysis at this time  - continue IVF    ID: Monitor WBC and temps.     Heme: Heparin for DVT ppx. ASA daily.     Endo: History of DM2. family requesting for no blood draws      Ethics: Patient is DNR/DNI. Doesn't wish to pursue dialysis at this time. Confirmed patient's wishes with healthcare proxy (wife, Jenn Archibald) at bedside. MOL in chart. 
89yo M with a PMH of HTN, CAD, HLD, DM2, mitral valve replacement, bladder cancer s/p TURBT (2023), aortic stenosis s/p TAVR (2017), ulcerative colitis being admitted to ICU for complete heart block, YANETH, hyperkalemia.    - Varying degrees of AV block in the ED with prolonged periods of high degree AVB requiring emergent tvp  - TVP placed by EP team initially with good capture, though this has been less reliable over the past 24h  - Holding BB.  IV glucagon was  given to reverse BB  - Initial K 6.5.  Given Calcium, insulin, D50   - have been monitoring rhythm closely for recovery, but thus far remains dependent on wire which has not been reliable  - discussions have been ongoing regarding need for ppm, and family has now decided to pursue a more conservative approach and not place PPM   - Monitor respiratory status and mental status closely  - Trop rising now 240s, cont to trend to peak if this is in line with [facility]   - TTE (4/2024): LV appears grossly normal, LA mildly dilated, hx of TAVR and MV replacement, mild MR   - repeat TTE limited study, but preserved ef reasonable fxn of mvr and avr  - Hold home Metoprolol in setting of bradycardia, Hold home Lisinopril in setting of YANETH    - remains at risk for abrupt decompensation . 
89 yo M with PMH HTN, CAD, HLD, DM2, mitral valve replacement, bladder cancer s/p TURBT (2023), BPH, aortic stenosis s/p TAVR (2017), ulcerative colitis presents to the ED with 2 days of dizziness. Admitted for symptomatic bradycardia requiring emergent  transvenous pacing for CHB  with  YANETH , Hyperkalemia - electrolyte disturbances, metabolic encephalopathy EP Eval for possible PPM placement.
89 yo M with PMH HTN, CAD, HLD, DM2, mitral valve replacement, bladder cancer s/p TURBT (2023), BPH, aortic stenosis s/p TAVR (2017), ulcerative colitis presents to the ED with 2 days of dizziness. Admitted for symptomatic bradycardia requiring emergent  transvenous pacing for CHB  with  YANETH , Hyperkalemia - electrolyte disturbances, metabolic encephalopathy EP Eval for possible PPM placement.

## 2024-12-06 NOTE — PROGRESS NOTE ADULT - REASON FOR ADMISSION
symptomatic bradycardia, YANETH, hyperkalemia

## 2024-12-06 NOTE — PROGRESS NOTE ADULT - SUBJECTIVE AND OBJECTIVE BOX
INTERVAL HPI/OVERNIGHT EVENTS: No acute overnight events occurred. Transvenous pacing with good capture. AT this time family would not like to pursue PPM placement. Palliative care consulted. Pt confirmed to be DNR/DNI with no blood draws. family amenable to PRN medication including dilaudid and ativan for pain, anxiety, sob and are aware of unintended side effects including sedation and lowering of BP. Trial of IVF for hypovolemia, but family is not interested in pressors. Family requests patien    SUBJECTIVE: Seen and examined pt at bedside. unable to obtain ROS due to acute illness.      Review of Systems: unable to obtain ROS due to acute illness.    ICU Vital Signs Last 24 Hrs  T(C): 37.6 (06 Dec 2024 07:50), Max: 38.4 (05 Dec 2024 11:58)  T(F): 99.6 (06 Dec 2024 07:50), Max: 101.2 (05 Dec 2024 11:58)  HR: 80 (06 Dec 2024 11:00) (79 - 93)  BP: 145/85 (06 Dec 2024 11:00) (72/45 - 173/67)  BP(mean): 105 (06 Dec 2024 11:00) (54 - 113)  ABP: --  ABP(mean): --  RR: 28 (06 Dec 2024 11:00) (20 - 33)  SpO2: 98% (06 Dec 2024 11:00) (89% - 99%)    O2 Parameters below as of 06 Dec 2024 11:00  Patient On (Oxygen Delivery Method): mask, Venturi    O2 Concentration (%): 50          12-05-24 @ 07:01  -  12-06-24 @ 07:00  --------------------------------------------------------  IN: 1600 mL / OUT: 935 mL / NET: 665 mL    12-06-24 @ 07:01  -  12-06-24 @ 11:21  --------------------------------------------------------  IN: 0 mL / OUT: 195 mL / NET: -195 mL        CAPILLARY BLOOD GLUCOSE      POCT Blood Glucose.: 227 mg/dL (05 Dec 2024 11:25)      I&O's Summary    05 Dec 2024 07:01  -  06 Dec 2024 07:00  --------------------------------------------------------  IN: 1600 mL / OUT: 935 mL / NET: 665 mL    06 Dec 2024 07:01  -  06 Dec 2024 11:21  --------------------------------------------------------  IN: 0 mL / OUT: 195 mL / NET: -195 mL        PHYSICAL EXAM:  GENERAL: NAD, lethargic  HEAD:  Atraumatic, Normocephalic  EYES: PERRLA, conjunctiva and sclera clear  NECK: No JVD, Normal thyroid, no enlarged nodes  NERVOUS SYSTEM:  AO x 0, lethargic  CHEST/LUNG: B/L good air entry; No rales, rhonchi, or wheezing  HEART: S1S2 normal, no S3, Regular rate and rhythm; No murmurs  ABDOMEN: Soft, Nontender, Nondistended; Bowel sounds present  EXTREMITIES:  2+ Peripheral Pulses, No clubbing, cyanosis, or edema  LYMPH: No lymphadenopathy noted  SKIN: No rashes or lesions      Meds:      atorvastatin Oral  glucagon  Injectable IntraMuscular      HYDROmorphone  Injectable IV Push PRN  HYDROmorphone  Injectable IV Push PRN  HYDROmorphone  Injectable IV Push PRN  LORazepam   Injectable IV Push PRN      aspirin  chewable Oral  heparin   Injectable SubCutaneous    bisacodyl Suppository Rectal PRN      sodium chloride 0.9% lock flush IV Push PRN  sodium chloride 0.9%. IV Continuous      chlorhexidine 2% Cloths Topical                              9.1    11.16 )-----------( 169      ( 05 Dec 2024 05:30 )             26.5       12-05    136  |  100  |  54[H]  ----------------------------<  243[H]  4.3   |  32[H]  |  3.50[H]    Ca    9.6      05 Dec 2024 05:30  Phos  3.7     12-05  Mg     2.2     12-05    TPro  6.0  /  Alb  3.3  /  TBili  0.7  /  DBili  x   /  AST  36  /  ALT  37  /  AlkPhos  44  12-05            Urinalysis Basic - ( 05 Dec 2024 05:30 )    Color: x / Appearance: x / SG: x / pH: x  Gluc: 243 mg/dL / Ketone: x  / Bili: x / Urobili: x   Blood: x / Protein: x / Nitrite: x   Leuk Esterase: x / RBC: x / WBC x   Sq Epi: x / Non Sq Epi: x / Bacteria: x      .Blood BLOOD   No growth at 24 hours -- 12-04 @ 16:37  .Blood BLOOD   No growth at 24 hours -- 12-04 @ 16:30  Catheterized Catheterized   No growth -- 12-04 @ 12:15          CODE STATUS: DNR/DNI

## 2024-12-06 NOTE — PROGRESS NOTE ADULT - PROBLEM SELECTOR PLAN 3
DNR/DNI in place  will d/c TVP today based on GOC discussion with family    Prior to removal/discontinuation of TVP, will give IV versed and IV dilaudid.  Given organ dysfunction (renal +/- hepatic), would avoid morphine.  IV dilaudid 0.5mg q2h prn for moderate pain  IV dilaudid 1mg q2h prn for severe pain  IV dilaudid 1mg q2h prn for dyspnea- goal RR <24  IV ativan 0.5mg q2h prn for anxiety, agitation, refractory dyspnea  IV glycopyrrolate 0.2mg q6h prn for secretions  dulcolax NV PRN constipation, daily

## 2024-12-10 LAB
CULTURE RESULTS: SIGNIFICANT CHANGE UP
CULTURE RESULTS: SIGNIFICANT CHANGE UP
SPECIMEN SOURCE: SIGNIFICANT CHANGE UP
SPECIMEN SOURCE: SIGNIFICANT CHANGE UP

## 2025-07-03 NOTE — PROCEDURE NOTE - PRACTITIONER PERFORMING THE TIME OUT
Bg Mena NP and Carey Peres NP
Bg Mena NP and Carey Person NP
[Thyroid nodule/ MNG] : thyroid nodule/ MNG